# Patient Record
Sex: MALE | Race: WHITE | NOT HISPANIC OR LATINO | Employment: FULL TIME | URBAN - METROPOLITAN AREA
[De-identification: names, ages, dates, MRNs, and addresses within clinical notes are randomized per-mention and may not be internally consistent; named-entity substitution may affect disease eponyms.]

---

## 2017-01-04 ENCOUNTER — APPOINTMENT (OUTPATIENT)
Dept: CT IMAGING | Facility: CLINIC | Age: 42
End: 2017-01-04
Attending: EMERGENCY MEDICINE
Payer: COMMERCIAL

## 2017-01-04 ENCOUNTER — TELEPHONE (OUTPATIENT)
Dept: NURSING | Facility: CLINIC | Age: 42
End: 2017-01-04

## 2017-01-04 ENCOUNTER — HOSPITAL ENCOUNTER (EMERGENCY)
Facility: CLINIC | Age: 42
Discharge: HOME | End: 2017-01-04
Attending: EMERGENCY MEDICINE | Admitting: EMERGENCY MEDICINE
Payer: COMMERCIAL

## 2017-01-04 VITALS
HEART RATE: 103 BPM | DIASTOLIC BLOOD PRESSURE: 80 MMHG | SYSTOLIC BLOOD PRESSURE: 121 MMHG | TEMPERATURE: 97.4 F | HEIGHT: 74 IN | RESPIRATION RATE: 20 BRPM | OXYGEN SATURATION: 100 % | WEIGHT: 240 LBS | BODY MASS INDEX: 30.8 KG/M2

## 2017-01-04 DIAGNOSIS — K60.2 ANAL FISSURE: ICD-10-CM

## 2017-01-04 LAB
ALBUMIN SERPL-MCNC: 3.7 G/DL (ref 3.4–5)
ALBUMIN UR-MCNC: NEGATIVE MG/DL
ALP SERPL-CCNC: 65 U/L (ref 40–150)
ALT SERPL W P-5'-P-CCNC: 38 U/L (ref 0–70)
ANION GAP SERPL CALCULATED.3IONS-SCNC: 5 MMOL/L (ref 3–14)
APPEARANCE UR: CLEAR
AST SERPL W P-5'-P-CCNC: 19 U/L (ref 0–45)
BASOPHILS # BLD AUTO: 0 10E9/L (ref 0–0.2)
BASOPHILS NFR BLD AUTO: 0.5 %
BILIRUB SERPL-MCNC: 0.7 MG/DL (ref 0.2–1.3)
BILIRUB UR QL STRIP: NEGATIVE
BUN SERPL-MCNC: 13 MG/DL (ref 7–30)
CALCIUM SERPL-MCNC: 8.7 MG/DL (ref 8.5–10.1)
CHLORIDE SERPL-SCNC: 105 MMOL/L (ref 94–109)
CO2 SERPL-SCNC: 30 MMOL/L (ref 20–32)
COLOR UR AUTO: ABNORMAL
CREAT SERPL-MCNC: 1.1 MG/DL (ref 0.66–1.25)
DIFFERENTIAL METHOD BLD: NORMAL
EOSINOPHIL # BLD AUTO: 0.1 10E9/L (ref 0–0.7)
EOSINOPHIL NFR BLD AUTO: 1.5 %
ERYTHROCYTE [DISTWIDTH] IN BLOOD BY AUTOMATED COUNT: 12 % (ref 10–15)
GFR SERPL CREATININE-BSD FRML MDRD: 74 ML/MIN/1.7M2
GLUCOSE SERPL-MCNC: 104 MG/DL (ref 70–99)
GLUCOSE UR STRIP-MCNC: NEGATIVE MG/DL
HCT VFR BLD AUTO: 47.1 % (ref 40–53)
HGB BLD-MCNC: 16.5 G/DL (ref 13.3–17.7)
HGB UR QL STRIP: NEGATIVE
IMM GRANULOCYTES # BLD: 0 10E9/L (ref 0–0.4)
IMM GRANULOCYTES NFR BLD: 0.3 %
KETONES UR STRIP-MCNC: NEGATIVE MG/DL
LEUKOCYTE ESTERASE UR QL STRIP: NEGATIVE
LIPASE SERPL-CCNC: 105 U/L (ref 73–393)
LYMPHOCYTES # BLD AUTO: 1.8 10E9/L (ref 0.8–5.3)
LYMPHOCYTES NFR BLD AUTO: 30 %
MCH RBC QN AUTO: 30 PG (ref 26.5–33)
MCHC RBC AUTO-ENTMCNC: 35 G/DL (ref 31.5–36.5)
MCV RBC AUTO: 86 FL (ref 78–100)
MONOCYTES # BLD AUTO: 0.5 10E9/L (ref 0–1.3)
MONOCYTES NFR BLD AUTO: 7.7 %
MUCOUS THREADS #/AREA URNS LPF: PRESENT /LPF
NEUTROPHILS # BLD AUTO: 3.6 10E9/L (ref 1.6–8.3)
NEUTROPHILS NFR BLD AUTO: 60 %
NITRATE UR QL: NEGATIVE
NRBC # BLD AUTO: 0 10*3/UL
NRBC BLD AUTO-RTO: 0 /100
PH UR STRIP: 6 PH (ref 5–7)
PLATELET # BLD AUTO: 255 10E9/L (ref 150–450)
POTASSIUM SERPL-SCNC: 4.1 MMOL/L (ref 3.4–5.3)
PROT SERPL-MCNC: 7 G/DL (ref 6.8–8.8)
RBC # BLD AUTO: 5.5 10E12/L (ref 4.4–5.9)
RBC #/AREA URNS AUTO: <1 /HPF (ref 0–2)
SODIUM SERPL-SCNC: 140 MMOL/L (ref 133–144)
SP GR UR STRIP: 1.01 (ref 1–1.03)
URN SPEC COLLECT METH UR: ABNORMAL
UROBILINOGEN UR STRIP-MCNC: NORMAL MG/DL (ref 0–2)
WBC # BLD AUTO: 6 10E9/L (ref 4–11)
WBC #/AREA URNS AUTO: <1 /HPF (ref 0–2)

## 2017-01-04 PROCEDURE — 25000128 H RX IP 250 OP 636: Performed by: EMERGENCY MEDICINE

## 2017-01-04 PROCEDURE — 81001 URINALYSIS AUTO W/SCOPE: CPT | Performed by: EMERGENCY MEDICINE

## 2017-01-04 PROCEDURE — 85025 COMPLETE CBC W/AUTO DIFF WBC: CPT | Performed by: EMERGENCY MEDICINE

## 2017-01-04 PROCEDURE — 87086 URINE CULTURE/COLONY COUNT: CPT | Performed by: EMERGENCY MEDICINE

## 2017-01-04 PROCEDURE — 80053 COMPREHEN METABOLIC PANEL: CPT | Performed by: EMERGENCY MEDICINE

## 2017-01-04 PROCEDURE — 25000125 ZZHC RX 250: Performed by: EMERGENCY MEDICINE

## 2017-01-04 PROCEDURE — 74177 CT ABD & PELVIS W/CONTRAST: CPT

## 2017-01-04 PROCEDURE — 25000132 ZZH RX MED GY IP 250 OP 250 PS 637: Performed by: EMERGENCY MEDICINE

## 2017-01-04 PROCEDURE — 83690 ASSAY OF LIPASE: CPT | Performed by: EMERGENCY MEDICINE

## 2017-01-04 PROCEDURE — 96361 HYDRATE IV INFUSION ADD-ON: CPT

## 2017-01-04 PROCEDURE — 99285 EMERGENCY DEPT VISIT HI MDM: CPT | Mod: 25

## 2017-01-04 PROCEDURE — 25500064 ZZH RX 255 OP 636: Performed by: EMERGENCY MEDICINE

## 2017-01-04 PROCEDURE — 96374 THER/PROPH/DIAG INJ IV PUSH: CPT | Mod: 59

## 2017-01-04 RX ORDER — ONDANSETRON 4 MG/1
4 TABLET, ORALLY DISINTEGRATING ORAL EVERY 8 HOURS PRN
Qty: 10 TABLET | Refills: 0 | Status: SHIPPED | OUTPATIENT
Start: 2017-01-04 | End: 2017-01-07

## 2017-01-04 RX ORDER — ONDANSETRON 2 MG/ML
4 INJECTION INTRAMUSCULAR; INTRAVENOUS
Status: DISCONTINUED | OUTPATIENT
Start: 2017-01-04 | End: 2017-01-04 | Stop reason: HOSPADM

## 2017-01-04 RX ORDER — IOPAMIDOL 755 MG/ML
500 INJECTION, SOLUTION INTRAVASCULAR ONCE
Status: COMPLETED | OUTPATIENT
Start: 2017-01-04 | End: 2017-01-04

## 2017-01-04 RX ORDER — LIDOCAINE 50 MG/G
OINTMENT TOPICAL
Qty: 35.44 G | Refills: 3 | Status: SHIPPED | OUTPATIENT
Start: 2017-01-04 | End: 2017-11-13

## 2017-01-04 RX ORDER — OXYCODONE HYDROCHLORIDE 5 MG/1
5 TABLET ORAL ONCE
Status: COMPLETED | OUTPATIENT
Start: 2017-01-04 | End: 2017-01-04

## 2017-01-04 RX ORDER — ACETAMINOPHEN 500 MG
1000 TABLET ORAL ONCE
Status: COMPLETED | OUTPATIENT
Start: 2017-01-04 | End: 2017-01-04

## 2017-01-04 RX ORDER — OXYCODONE HYDROCHLORIDE 5 MG/1
5 TABLET ORAL EVERY 6 HOURS PRN
Qty: 20 TABLET | Refills: 0 | Status: SHIPPED | OUTPATIENT
Start: 2017-01-04 | End: 2017-11-13

## 2017-01-04 RX ADMIN — SODIUM CHLORIDE 65 ML: 9 INJECTION, SOLUTION INTRAVENOUS at 10:31

## 2017-01-04 RX ADMIN — SODIUM CHLORIDE 1000 ML: 9 INJECTION, SOLUTION INTRAVENOUS at 10:07

## 2017-01-04 RX ADMIN — IOPAMIDOL 100 ML: 755 INJECTION, SOLUTION INTRAVENOUS at 10:31

## 2017-01-04 RX ADMIN — OXYCODONE HYDROCHLORIDE 5 MG: 5 TABLET ORAL at 11:57

## 2017-01-04 RX ADMIN — ACETAMINOPHEN 1000 MG: 500 TABLET, FILM COATED ORAL at 09:51

## 2017-01-04 RX ADMIN — ONDANSETRON 4 MG: 2 INJECTION INTRAMUSCULAR; INTRAVENOUS at 10:07

## 2017-01-04 ASSESSMENT — ENCOUNTER SYMPTOMS
CONSTIPATION: 1
RECTAL PAIN: 1
NAUSEA: 0
BLOOD IN STOOL: 0
ANAL BLEEDING: 1
FEVER: 0
DIARRHEA: 1
ABDOMINAL PAIN: 0
VOMITING: 0

## 2017-01-04 NOTE — DISCHARGE INSTRUCTIONS
"Anal Fissure    Specific instructions from Colorectal Surgery:  - Discharge with topical diltiazem cream, apply pea-sized amount 2-3 daily for 6 weeks.    - Promote blood flow to the area with warm baths without salt for 10-15 minutes 2-3 times daily. Also can try sitting on a heating pad for discomfort. Also recommend warm bath before and after BM to relax pelvic floor  - Discharge with topical lidocaine, as needed for discomfort.  - Recommend discontinue medicated wipes and use gentle soaps or non-medicated wet wipes with gentle wiping following bowel movements  - Can follow-up with CRS as outpatient in 6 weeks, call clinic to schedule at [phone].        What is an anal fissure? -- An anal fissure is a tear in the lining of the anus, the opening where your bowel movements come out (figure 1). Anal fissures cause pain, especially during a bowel movement.  There is a muscle that wraps around the anus and holds it shut. It is called the \"anal sphincter.\" The sphincter gets tense when the anus is injured. In people with anal fissures, the sphincter goes into spasms, which can lead to further injury.  What causes anal fissures? -- An anal fissure is most often caused by having a hard, dry bowel movement.  What are the symptoms of an anal fissure? -- Most people who have an anal fissure feel a tearing, ripping, or burning pain when they have a bowel movement. This pain can last for hours. Some people also bleed slightly when they have a bowel movement. They might see bright red blood on the toilet paper or on the surface of the bowel movement. Some people with an anal fissure also have itching or irritation around the anus.  Should I see my doctor or nurse? -- Yes. See your doctor or nurse if you bleed when you have a bowel movement.  Are there tests I should have? -- Your doctor or nurse can check whether you have anal fissure by gently spreading your buttocks apart and looking at your anus.  If you have had " "bleeding, your doctor or nurse might send you for a test called a \"sigmoidoscopy\" or a similar test called a \"colonoscopy\" (figure 2). For these tests, the doctor puts a thin tube into your anus and advances it into your colon. The tube has a camera attached to it, so the doctor can look inside your colon and check for causes of bleeding.  What can I do on my own to help an anal fissure? -- You can:  ?Eat more fiber - Eating more fiber can help keep your bowel movements soft. Fiber is found in most fruits, vegetables, and whole grains (figure 3). Doctors suggest eating 20 to 35 grams of fiber a day. The nutrition label on packaged foods can show you much fiber you are getting in each serving (figure 4).  ?Take a stool softener - Stool softeners are medicines that help make your bowel movements easier to pass. You can buy them without a prescription. One stool softener that is often used is called docusate (sample brand name: Colace).  ?Soak your buttocks in a \"sitz bath\" - Soaking your buttocks in 2 or 3 inches of warm water is called taking a \"sitz bath.\" Do this 2 to 3 times a day for 10 to 15 minutes. Do not add soap, bubble bath, or anything else to the water. Sitz baths help relieve pain and relax the sphincter.  How are anal fissures treated? -- Treatment for anal fissures involves steps that are similar to the things you can do on your own.  For the first month of treatment, doctors recommend that people:  ?Take fiber supplements, such as:   Psyllium (sample brand name: Metamucil)   Methylcellulose (sample brand name: Citrucel)   Calcium polycarbophil (sample brand name: FiberCon)   Wheat dextran (sample brand name: Benefiber)  ?Take a stool softener, such as docusate (sample brand name: Colace).  ?Use nitroglycerin cream, a prescription cream that relieves pain and helps the anus heal.  ?Take sitz baths (see above).  If these steps to do not work, doctors can try other treatments, such as:  ?Botulinum toxin " "(\"BoTox\") - This is a shot that can help the anal sphincter muscle relax and heal. It can help, but it can also cause short-term problems with leaking of gas or bowel movements.  ?Surgery - During surgery, the doctor makes a small cut in the sphincter to help it relax. This surgery works in most patients, but doctors offer it only to people who do not get better with other treatments. Surgery can cause lasting problems with leaking of gas or bowel movements.    "

## 2017-01-04 NOTE — TELEPHONE ENCOUNTER
"Call Type: Triage Call    Presenting Problem: \"I have extreme discomfort in my anus.\" Patient  reporting symptoms starting 3 weeks ago with rectal pain that his  increased in past few days. Reporting leaking of \"white fluid.\"  Patient agrees to go into [facility] ED.  Triage Note:  Guideline Title: Rectal Symptoms  Recommended Disposition: See ED Immediately  Original Inclination: Did not know what to do  Override Disposition:  Intended Action: Follow advice given  Physician Contacted: No  Constant pain in rectum or rectal area AND swelling, boil or abscess around  rectum, with or without foul-smelling, pus-like drainage ?  YES  Passing red, black or tarry material from rectum AND onset of new signs and  symptoms of hypovolemia ? NO  Primary concern is constipation ? NO  Reports physical or sexual abuse AND feels they are in immediate danger ? NO  Severe, unrelenting rectal pain ? NO  Associated with abdominal pain or discomfort ? NO  Excessive or unusually odorous flatulence ? NO  Rectal bleeding (blood in or on the stool, more than scant; black tarry stools) ?  NO  Trauma within last 6 hours AND severe pain OR bright red rectal bleeding ? NO  Physician Instructions:  Care Advice: Another adult should drive.  Do not give the patient anything to eat or drink.  Write down provider's name. List or place the following in a bag for  transport with the patient: current prescription and/or nonprescription  medications  alternative treatments, therapies and medications  and street drugs.  "

## 2017-01-04 NOTE — ED AVS SNAPSHOT
[facility] Emergency Department    [address]    [facility] [address]    Phone:  [phone]    Fax:  [phone]                                       Paul Mckeon   MRN: [id number]    Department:  [facility] Emergency Department   Date of Visit:  1/4/2017           After Visit Summary Signature Page     I have received my discharge instructions, and my questions have been answered. I have discussed any challenges I see with this plan with the nurse or doctor.    ..........................................................................................................................................  Patient/Patient Representative Signature      ..........................................................................................................................................  Patient Representative Print Name and Relationship to Patient    ..................................................               ................................................  Date                                            Time    ..........................................................................................................................................  Reviewed by Signature/Title    ...................................................              ..............................................  Date                                                            Time

## 2017-01-04 NOTE — CONSULTS
[facility]  Colon and Rectal Surgery Consult Note  Name: Paul Mckeon    MRN: [id number]  YOB: 1975    Age: 41 year old  Date of admission: 1/4/2017  Primary care provider: Shiraz Gil     Requesting Physician:  ED physician  Reason for consult:  Rectal pain            History of Present Illness:   Paul Mckeon is a 41 year old male who presents with rectal pain worsening for the past 2-3 weeks. He describes pain as ripping or tearing with bowel movements with residual pain and intermittent sharp stabbing pain. He has had bleeding with wiping and in his stool that seems to be worsening. He had some leaking of clear fluid over the past few days that seems worst last night. He has been applying Tux and goldbond with lidocaine with minimal relief of pain. He feels he noticed a visible external hemorrhoid a few days ago that has since resolved. Pt has BM daily that are initially harder and more difficult to pass followed by soft stools. Pt denies fever or chills.   He has no history of rectal surgery.     In ED, pt has remained afebrile, VSS. WBC within normal limits. Abd/pelvis CT without abnormalities, no signs of abscess or infection.               Past Medical History:   History reviewed. No pertinent past medical history.          Past Surgical History:     Past Surgical History   Procedure Laterality Date     Arthroscopic reconstruction anterior and posterior cruciate ligament, combined       Tonsillectomy       Hand surgery                 Social History:     Social History   Substance Use Topics     Smoking status: Former Smoker     Types: Cigarettes     Smokeless tobacco: Current User     Types: Chew      Comment: quit smoking in early 2000's and quit chew in 2013     Alcohol Use: 8.4 oz/week     14 Standard drinks or equivalent per week      Comment: 2-3 days per week, 2-6 drinks per occasion             Family History:     Family History   Problem Relation Age of Onset      "Thyroid Disease Mother      Hypertension Mother      Obesity Mother      HEART DISEASE Father      Arrythmia     Thyroid Disease Maternal Grandmother      Respiratory Father      EB     Respiratory Mother      EB             Allergies:     Allergies   Allergen Reactions     Seasonal Allergies              Medications:              Review of Systems:   A comprehensive greater than 10 system review of systems was carried out.  Pertinent positives and negatives are noted above.  Otherwise negative for contributory info.            Physical Exam:   Blood pressure 130/74, pulse 103, temperature 97.4  F (36.3  C), temperature source Temporal, resp. rate 18, height 1.88 m (6' 2\"), weight 108.863 kg (240 lb), SpO2 96 %.  No intake or output data in the 24 hours ending 01/04/17 1230  Exam:  GENERAL: Awake alert and oriented  LUNGS: Non labored breathing  EXTREMITIES: warm without edema  RECTAL: Tenderness on exam with superficial posterior fissure visualized on exam.          Data:     Results for orders placed or performed during the hospital encounter of 01/04/17   CT Abdomen Pelvis w Contrast    Narrative    CT ABDOMEN/PELVIS WITH CONTRAST January 4, 2017 10:40 AM     HISTORY: Perirectal pain and drainage.    COMPARISON: None.    TECHNIQUE: CT of the abdomen and pelvis was performed following the  administration of 100 mL Isovue-370 intravenous contrast. Radiation  dose for this scan was reduced using automated exposure control,  adjustment of the mA and/or kV according to patient size, or iterative  reconstruction technique.    FINDINGS: There is a 3 mm hypodensity in the right lobe of the liver.  This is too small to characterize but most likely represents a tiny  cyst. The remaining solid organs in the abdomen appear unremarkable.    There are a few scattered diverticuli but no evidence for acute  diverticulitis. The bowel otherwise appears grossly unremarkable. No  definite abscess in the perirectal region on CT.   "    Impression    IMPRESSION: No definite acute abnormality.         Recent Labs  Lab 01/04/17  0956   WBC 6.0   HGB 16.5   HCT 47.1   MCV 86          Recent Labs  Lab 01/04/17  0956      POTASSIUM 4.1   CHLORIDE 105   CO2 30   ANIONGAP 5   *   BUN 13   CR 1.10   GFRESTIMATED 74   GFRESTBLACK 90   ANDREW 8.7   PROTTOTAL 7.0   ALBUMIN 3.7   BILITOTAL 0.7   ALKPHOS 65   AST 19   ALT 38         Assessment and Plan:   Paul Mckeon is a 40 yo man who presents with 2-3 weeks of worsening rectal pain.   1. No emergent surgical intervention indicated. Agree with non-operative management.   2. Discharge with topical diltiazem cream, apply pea-sized amount 2-3 daily for 6 weeks.   3. Promote blood flow to the area with warm baths without salt for 10-15 minutes 2-3 times daily. Also can try sitting on a heating pad for discomfort. Also recommend warm bath before and after BM to relax pelvic floor  4. Discharge with topical lidocaine, as needed for discomfort.  5. Recommend pt discontinue medicated wipes and use gentle soaps or non-medicated wet wipes with gentle wiping following bowel movements  6. Can follow-up with CRS as outpatient in 6 weeks, call clinic to schedule at [phone].         Lesa Morocho PA-C  [facility]  [phone]

## 2017-01-04 NOTE — ED PROVIDER NOTES
"  History     Chief Complaint:  Other    HPI   Paul Mckeon is a 41 year old male with a history of hemorrhoids who presents with rectal pain. He states that he has had a burning pain originating from the rectum for the past two to three weeks, which started as a burning pain. The pain has gotten progressively worse over this time, and he has noted a white discharge from the rectum. He endorses diarrhea along with constipation, though denies abdominal pain. He notes bright red blood per rectum. States he has had stools that are grey in appearance. Denies fever, numbness, nausea, vomiting.    Allergies:  Seasonal allergies      Medications:    The patient is currently on no regular medications.    Past Medical History:    The patient denies any relevant past medical history.      Past Surgical History:    Arthroscopic ACL repair  Tonsillectomy  Hand surgery     Family History:    Thyroid disease  HTN  Obesity  Arrhythmia  EB  Respiratory     Social History:  The patient was accompanied to the ED by wife.  Smoking Status: Former smoker  Smokeless Tobacco: Current user  Alcohol Use: Yes   Marital Status:   [2]     Review of Systems   Constitutional: Negative for fever.   Gastrointestinal: Positive for diarrhea, constipation, anal bleeding and rectal pain. Negative for nausea, vomiting, abdominal pain and blood in stool.   All other systems reviewed and are negative.    Physical Exam   Vitals:  Patient Vitals for the past 24 hrs:   BP Temp Temp src Pulse Resp SpO2 Height Weight   01/04/17 1336 121/80 mmHg - - - 20 100 % - -   01/04/17 1251 - - - - - 96 % - -   01/04/17 1215 - - - - - 96 % - -   01/04/17 1158 130/74 mmHg - - - - 96 % - -   01/04/17 1015 - - - - - 95 % - -   01/04/17 1013 - - - - - 95 % - -   01/04/17 1012 135/84 mmHg - - - - 93 % - -   01/04/17 0843 (!) 152/111 mmHg 97.4  F (36.3  C) Temporal 103 18 99 % 1.88 m (6' 2\") 108.863 kg (240 lb)      Physical Exam  Constitutional: Alert, " attentive  HENT:    Nose: Nose normal.    Mouth/Throat: Oropharynx is clear, mucous membranes are moist  Eyes:  EOM are normal. Pupils are equal, round, and reactive to light.   CV:  regular rate and rhythm; no murmurs, rubs or gallups  Chest: Effort normal and breath sounds normal.   GI:   Epigastrium - no tenderness, no guarding   RUQ - no tenderness or Ruiz's sign   RLQ - No tenderness, no guarding, no Rovsing's sign   Suprapubic area - no tenderness, no guarding    LLQ - no tenderness, no guarding   LUQ - no tenderness, no guarding   No distension. Normal bowel sounds   Edematous mucosa to the perirectal area, no palpable fluctuance or drainage; no obvious external hemorrhoid; MIGUEL with pain externally, no obvious bogginess or tenderness to the prostate  MSK: Normal range of motion.   Neurological: Alert, attentive  Skin: Skin is warm and dry.    Emergency Department Course     Imaging:  Radiology findings were communicated with the patient who voiced understanding of the findings.  CT Abdomen and pelvis with contrast:  No definite acute abnormality.  Reading per radiology.     Laboratory:  Laboratory findings were communicated with the patient who voiced understanding of the findings.  CBC: AWNL. (WBC 6.0, HGB 16.5, )   CMP: Glucose: 104 (H) o/w WNL (Creatinine 1.10)   Lipase: 105  UA: Mucous: Present (A) o/w normal     Interventions:  0951 Tylenol 1000 mg PO   1007 Normal Saline 1000 mL IV  1007 Zofran 4 mg IV  1157 Roxicodone 5 mg PO     Emergency Department Course:  Nursing notes and vitals reviewed.  I performed an exam of the patient as documented above.   IV was inserted and blood was drawn for laboratory testing, results above.   The patient was sent for a abdomen and pelvis CT with contrast while in the emergency department, results above.    The patient provided a urine sample here in the emergency department. This was sent for laboratory testing, findings above.   At 1107 the patient was  rechecked and was updated on the results of his tests.   1128 AM: I spoke with Dr. Dowell of the radiology service regarding patient's CT results.  1200 AM: I spoke with Lesa Morocho PA-C, of the colo-rectal service regarding patient's presentation, findings, and plan of care. They will evaluate the patient in the ED.  1300 The patient was rechecked and is feeling much better after interventions here.     I discussed the treatment plan with the patient. They expressed understanding of this plan and consented to discharge. They will be discharged home with instructions for care and follow up. In addition, the patient will return to the emergency department if their symptoms persist, worsen, if new symptoms arise or if there is any concern.  All questions were answered.    I personally reviewed the laboratory results with the Patient and answered all related questions prior to discharge.    Impression & Plan      Medical Decision Making:  Paul Mckeon is a 41 year old male who presents to the emergency department today with rectal pain. Initial exam does not reveal a thrombosed hemorrhoid or other acute process. CT shows does not show infections such as perirectal abscess. [place]-rect[facility] surgery saw the patient and note a posterior anal fissure. We discussed supportive cares and he felt better on recheck. I believe he is safe for discharge at this time with diltiazem cram and lidocaine topically, sitz baths and colo-rectal follow up in 2-3 weeks. Return with worse pain, fever, vomiting or other concerns.     Diagnosis:    ICD-10-CM    1. Anal fissure K60.2 Urine Culture Aerobic Bacterial      Disposition:   Discharge to home    Discharge Medications:  Discharge Medication List as of 1/4/2017  1:26 PM      START taking these medications    Details   diltiazem 2% in PLO cream, FV COMPOUNDED, 2% GEL Apply pea sized amount 2-3 times daily for 6 weeks, Disp-30 g, R-3, Local Print      lidocaine (XYLOCAINE) 5 % ointment Apply  2-3 times daily for painDisp-35.44 g, R-3Local Print      oxyCODONE (ROXICODONE) 5 MG IR tablet Take 1 tablet (5 mg) by mouth every 6 hours as needed for moderate to severe pain, Disp-20 tablet, R-0, Local Print      ondansetron (ZOFRAN ODT) 4 MG ODT tab Take 1 tablet (4 mg) by mouth every 8 hours as needed for nausea, Disp-10 tablet, R-0, Local Print           Scribe Disclosure:  I, Paul Escalera, am serving as a scribe at 9:36 AM on 1/4/2017 to document services personally performed by Enrico Durham MD, based on my observations and the provider's statements to me.   1/4/2017   [facility] EMERGENCY DEPARTMENT        Enrico Durham MD  01/04/17 0895

## 2017-01-04 NOTE — ED AVS SNAPSHOT
" [facility]    [address] [place] [address]    Phone:  [phone]    Fax:  [phone]                                       Paul Mckeon   MRN: [id number]    Department:  [facility]   Date of Visit:  1/4/2017           Patient Information     Date Of Birth          1975        Your diagnoses for this visit were:     Anal fissure        You were seen by Enrico Durham MD.      Follow-up Information     Follow up with Chandler Akbar MD In 2 weeks.    Specialty:  Colon and Rectal Surgery    Contact information:    [facility]  [address] [place] [address]  [phone]          Discharge Instructions       Anal Fissure    Specific instructions from Colorectal Surgery:  - Discharge with topical diltiazem cream, apply pea-sized amount 2-3 daily for 6 weeks.    - Promote blood flow to the area with warm baths without salt for 10-15 minutes 2-3 times daily. Also can try sitting on a heating pad for discomfort. Also recommend warm bath before and after BM to relax pelvic floor  - Discharge with topical lidocaine, as needed for discomfort.  - Recommend discontinue medicated wipes and use gentle soaps or non-medicated wet wipes with gentle wiping following bowel movements  - Can follow-up with [facility] as outpatient in 6 weeks, call clinic to schedule at [phone].        What is an anal fissure? -- An anal fissure is a tear in the lining of the anus, the opening where your bowel movements come out (figure 1). Anal fissures cause pain, especially during a bowel movement.  There is a muscle that wraps around the anus and holds it shut. It is called the \"anal sphincter.\" The sphincter gets tense when the anus is injured. In people with anal fissures, the sphincter goes into spasms, which can lead to further injury.  What causes anal fissures? -- An anal fissure is most often caused by having a hard, " "dry bowel movement.  What are the symptoms of an anal fissure? -- Most people who have an anal fissure feel a tearing, ripping, or burning pain when they have a bowel movement. This pain can last for hours. Some people also bleed slightly when they have a bowel movement. They might see bright red blood on the toilet paper or on the surface of the bowel movement. Some people with an anal fissure also have itching or irritation around the anus.  Should I see my doctor or nurse? -- Yes. See your doctor or nurse if you bleed when you have a bowel movement.  Are there tests I should have? -- Your doctor or nurse can check whether you have anal fissure by gently spreading your buttocks apart and looking at your anus.  If you have had bleeding, your doctor or nurse might send you for a test called a \"sigmoidoscopy\" or a similar test called a \"colonoscopy\" (figure 2). For these tests, the doctor puts a thin tube into your anus and advances it into your colon. The tube has a camera attached to it, so the doctor can look inside your colon and check for causes of bleeding.  What can I do on my own to help an anal fissure? -- You can:  ?Eat more fiber - Eating more fiber can help keep your bowel movements soft. Fiber is found in most fruits, vegetables, and whole grains (figure 3). Doctors suggest eating 20 to 35 grams of fiber a day. The nutrition label on packaged foods can show you much fiber you are getting in each serving (figure 4).  ?Take a stool softener - Stool softeners are medicines that help make your bowel movements easier to pass. You can buy them without a prescription. One stool softener that is often used is called docusate (sample brand name: Colace).  ?Soak your buttocks in a \"sitz bath\" - Soaking your buttocks in 2 or 3 inches of warm water is called taking a \"sitz bath.\" Do this 2 to 3 times a day for 10 to 15 minutes. Do not add soap, bubble bath, or anything else to the water. Sitz baths help relieve pain " "and relax the sphincter.  How are anal fissures treated? -- Treatment for anal fissures involves steps that are similar to the things you can do on your own.  For the first month of treatment, doctors recommend that people:  ?Take fiber supplements, such as:   Psyllium (sample brand name: Metamucil)   Methylcellulose (sample brand name: Citrucel)   Calcium polycarbophil (sample brand name: FiberCon)   Wheat dextran (sample brand name: Benefiber)  ?Take a stool softener, such as docusate (sample brand name: Colace).  ?Use nitroglycerin cream, a prescription cream that relieves pain and helps the anus heal.  ?Take sitz baths (see above).  If these steps to do not work, doctors can try other treatments, such as:  ?Botulinum toxin (\"BoTox\") - This is a shot that can help the anal sphincter muscle relax and heal. It can help, but it can also cause short-term problems with leaking of gas or bowel movements.  ?Surgery - During surgery, the doctor makes a small cut in the sphincter to help it relax. This surgery works in most patients, but doctors offer it only to people who do not get better with other treatments. Surgery can cause lasting problems with leaking of gas or bowel movements.      24 Hour Appointment Hotline       To make an appointment at any [facility] clinic, call [phone] ([phone]). If you don't have a family doctor or clinic, we will help you find one. [facility] clinics are conveniently located to serve the needs of you and your family.             Review of your medicines      START taking        Dose / Directions Last dose taken    diltiazem 2% in PLO cream (FV COMPOUNDED) 2% Gel   Quantity:  30 g        Apply pea sized amount 2-3 times daily for 6 weeks   Refills:  3        lidocaine 5 % ointment   Commonly known as:  XYLOCAINE   Quantity:  35.44 g        Apply 2-3 times daily for pain   Refills:  3        ondansetron 4 MG ODT tab   Commonly known as:  ZOFRAN ODT   Dose:  4 mg   Quantity:  10 " tablet        Take 1 tablet (4 mg) by mouth every 8 hours as needed for nausea   Refills:  0        oxyCODONE 5 MG IR tablet   Commonly known as:  ROXICODONE   Dose:  5 mg   Quantity:  20 tablet        Take 1 tablet (5 mg) by mouth every 6 hours as needed for moderate to severe pain   Refills:  0          Our records show that you are taking the medicines listed below. If these are incorrect, please call your family doctor or clinic.        Dose / Directions Last dose taken    * ibuprofen 800 MG tablet   Commonly known as:  ADVIL/MOTRIN   Dose:  800 mg   Quantity:  90 tablet        Take 1 tablet (800 mg) by mouth every 8 hours as needed for moderate pain   Refills:  0        * ibuprofen 800 MG tablet   Commonly known as:  ADVIL/MOTRIN   Dose:  800 mg   Quantity:  30 tablet        Take 1 tablet (800 mg) by mouth every 8 hours as needed for moderate pain   Refills:  1        * Notice:  This list has 2 medication(s) that are the same as other medications prescribed for you. Read the directions carefully, and ask your doctor or other care provider to review them with you.            Prescriptions were sent or printed at these locations (4 Prescriptions)                   Other Prescriptions                Printed at Department/Unit printer (4 of 4)         diltiazem 2% in PLO cream, FV COMPOUNDED, 2% GEL               lidocaine (XYLOCAINE) 5 % ointment               oxyCODONE (ROXICODONE) 5 MG IR tablet               ondansetron (ZOFRAN ODT) 4 MG ODT tab                Procedures and tests performed during your visit     CBC + differential    CT Abdomen Pelvis w Contrast    Comprehensive metabolic panel    Lipase    UA with Microscopic    Urine Culture Aerobic Bacterial      Orders Needing Specimen Collection     None      Pending Results     Date and Time Order Name Status Description    1/4/2017 0945 Urine Culture Aerobic Bacterial In process     1/4/2017 0939 CT Abdomen Pelvis w Contrast Preliminary              Pending Culture Results     Date and Time Order Name Status Description    1/4/2017 0945 Urine Culture Aerobic Bacterial In process        Test Results from your hospital stay           1/4/2017 10:47 AM - Interface, Radiant Ib      Narrative     CT ABDOMEN/PELVIS WITH CONTRAST January 4, 2017 10:40 AM     HISTORY: Perirectal pain and drainage.    COMPARISON: None.    TECHNIQUE: CT of the abdomen and pelvis was performed following the  administration of 100 mL Isovue-370 intravenous contrast. Radiation  dose for this scan was reduced using automated exposure control,  adjustment of the mA and/or kV according to patient size, or iterative  reconstruction technique.    FINDINGS: There is a 3 mm hypodensity in the right lobe of the liver.  This is too small to characterize but most likely represents a tiny  cyst. The remaining solid organs in the abdomen appear unremarkable.    There are a few scattered diverticuli but no evidence for acute  diverticulitis. The bowel otherwise appears grossly unremarkable. No  definite abscess in the perirectal region on CT.        Impression     IMPRESSION: No definite acute abnormality.         1/4/2017 10:10 AM - [facility]ilab Results      Component Results     Component Value Ref Range & Units Status    WBC 6.0 4.0 - 11.0 10e9/L Final    RBC Count 5.50 4.4 - 5.9 10e12/L Final    Hemoglobin 16.5 13.3 - 17.7 g/dL Final    Hematocrit 47.1 40.0 - 53.0 % Final    MCV 86 78 - 100 fl Final    MCH 30.0 26.5 - 33.0 pg Final    MCHC 35.0 31.5 - 36.5 g/dL Final    RDW 12.0 10.0 - 15.0 % Final    Platelet Count 255 150 - 450 10e9/L Final    Diff Method Automated Method  Final    % Neutrophils 60.0 % Final    % Lymphocytes 30.0 % Final    % Monocytes 7.7 % Final    % Eosinophils 1.5 % Final    % Basophils 0.5 % Final    % Immature Granulocytes 0.3 % Final    Nucleated RBCs 0 0 /100 Final    Absolute Neutrophil 3.6 1.6 - 8.3 10e9/L Final    Absolute Lymphocytes 1.8 0.8 - 5.3 10e9/L Final     Absolute Monocytes 0.5 0.0 - 1.3 10e9/L Final    Absolute Eosinophils 0.1 0.0 - 0.7 10e9/L Final    Absolute Basophils 0.0 0.0 - 0.2 10e9/L Final    Abs Immature Granulocytes 0.0 0 - 0.4 10e9/L Final    Absolute Nucleated RBC 0.0  Final         1/4/2017 10:28 AM - Interface, Flexilab Results      Component Results     Component Value Ref Range & Units Status    Sodium 140 133 - 144 mmol/L Final    Potassium 4.1 3.4 - 5.3 mmol/L Final    Chloride 105 94 - 109 mmol/L Final    Carbon Dioxide 30 20 - 32 mmol/L Final    Anion Gap 5 3 - 14 mmol/L Final    Glucose 104 (H) 70 - 99 mg/dL Final    Urea Nitrogen 13 7 - 30 mg/dL Final    Creatinine 1.10 0.66 - 1.25 mg/dL Final    GFR Estimate 74 >60 mL/min/1.7m2 Final    Non  GFR Calc    GFR Estimate If Black 90 >60 mL/min/1.7m2 Final    African American GFR Calc    Calcium 8.7 8.5 - 10.1 mg/dL Final    Bilirubin Total 0.7 0.2 - 1.3 mg/dL Final    Albumin 3.7 3.4 - 5.0 g/dL Final    Protein Total 7.0 6.8 - 8.8 g/dL Final    Alkaline Phosphatase 65 40 - 150 U/L Final    ALT 38 0 - 70 U/L Final    AST 19 0 - 45 U/L Final         1/4/2017 10:28 AM - Interface, Flexilab Results      Component Results     Component Value Ref Range & Units Status    Lipase 105 73 - 393 U/L Final         1/4/2017 10:24 AM - Interface, Flexilab Results      Component Results     Component Value Ref Range & Units Status    Color Urine Light Yellow  Final    Appearance Urine Clear  Final    Glucose Urine Negative NEG mg/dL Final    Bilirubin Urine Negative NEG Final    Ketones Urine Negative NEG mg/dL Final    Specific Gravity Urine 1.006 1.003 - 1.035 Final    Blood Urine Negative NEG Final    pH Urine 6.0 5.0 - 7.0 pH Final    Protein Albumin Urine Negative NEG mg/dL Final    Urobilinogen mg/dL Normal 0.0 - 2.0 mg/dL Final    Nitrite Urine Negative NEG Final    Leukocyte Esterase Urine Negative NEG Final    Source Midstream Urine  Final    WBC Urine <1 0 - 2 /HPF Final    RBC Urine  <1 0 - 2 /HPF Final    Mucous Urine Present (A) NEG /LPF Final         1/4/2017 11:44 AM - Interface, Flexilab Results                Clinical Quality Measure: Blood Pressure Screening     Your blood pressure was checked while you were in the emergency department today. The last reading we obtained was  BP: 130/74 mmHg . Please read the guidelines below about what these numbers mean and what you should do about them.  If your systolic blood pressure (the top number) is less than 120 and your diastolic blood pressure (the bottom number) is less than 80, then your blood pressure is normal. There is nothing more that you need to do about it.  If your systolic blood pressure (the top number) is 120-139 or your diastolic blood pressure (the bottom number) is 80-89, your blood pressure may be higher than it should be. You should have your blood pressure rechecked within a year by a primary care provider.  If your systolic blood pressure (the top number) is 140 or greater or your diastolic blood pressure (the bottom number) is 90 or greater, you may have high blood pressure. High blood pressure is treatable, but if left untreated over time it can put you at risk for heart attack, stroke, or kidney failure. You should have your blood pressure rechecked by a primary care provider within the next 4 weeks.  If your provider in the emergency department today gave you specific instructions to follow-up with your doctor or provider even sooner than that, you should follow that instruction and not wait for up to 4 weeks for your follow-up visit.        Thank you for choosing [facility]       Thank you for choosing [facility] for your care. Our goal is always to provide you with excellent care. Hearing back from our patients is one way we can continue to improve our services. Please take a few minutes to complete the written survey that you may receive in the mail after you visit with us. Thank you!        MyChart Information      [facility] gives you secure access to your electronic health record. If you see a primary care provider, you can also send messages to your care team and make appointments. If you have questions, please call your primary care clinic.  If you do not have a primary care provider, please call [phone] and they will assist you.        Care EveryWhere ID     This is your Care EveryWhere ID. This could be used by other organizations to access your [facility] medical records  [id number]        After Visit Summary       This is your record. Keep this with you and show to your community pharmacist(s) and doctor(s) at your next visit.

## 2017-01-05 LAB
BACTERIA SPEC CULT: NO GROWTH
Lab: NORMAL
MICRO REPORT STATUS: NORMAL
SPECIMEN SOURCE: NORMAL

## 2017-03-14 ENCOUNTER — TELEPHONE (OUTPATIENT)
Dept: FAMILY MEDICINE | Facility: CLINIC | Age: 42
End: 2017-03-14

## 2017-03-14 DIAGNOSIS — F17.200 TOBACCO USE DISORDER: Primary | ICD-10-CM

## 2017-03-14 RX ORDER — BUPROPION HYDROCHLORIDE 150 MG/1
TABLET, EXTENDED RELEASE ORAL
Qty: 60 TABLET | Refills: 2 | Status: SHIPPED | OUTPATIENT
Start: 2017-03-14 | End: 2017-07-04

## 2017-03-14 NOTE — TELEPHONE ENCOUNTER
Pt calling stating he is ready to quit smoking and has used bupropion in the past.  He is wondering if PCP will prescribe this for him again.    Please advise.  Pt last seen for px on 12/12/16    Michael Rios RN, BSN

## 2017-05-08 ENCOUNTER — OFFICE VISIT (OUTPATIENT)
Dept: FAMILY MEDICINE | Facility: CLINIC | Age: 42
End: 2017-05-08
Payer: COMMERCIAL

## 2017-05-08 VITALS
OXYGEN SATURATION: 95 % | TEMPERATURE: 97.4 F | SYSTOLIC BLOOD PRESSURE: 128 MMHG | BODY MASS INDEX: 31.18 KG/M2 | DIASTOLIC BLOOD PRESSURE: 80 MMHG | HEART RATE: 81 BPM | HEIGHT: 74 IN | WEIGHT: 243 LBS

## 2017-05-08 DIAGNOSIS — J30.2 SEASONAL ALLERGIC RHINITIS, UNSPECIFIED ALLERGIC RHINITIS TRIGGER: ICD-10-CM

## 2017-05-08 DIAGNOSIS — B00.1 RECURRENT COLD SORES: ICD-10-CM

## 2017-05-08 DIAGNOSIS — J20.9 ACUTE BRONCHITIS WITH COEXISTING CONDITION REQUIRING PROPHYLACTIC TREATMENT: Primary | ICD-10-CM

## 2017-05-08 PROCEDURE — 99213 OFFICE O/P EST LOW 20 MIN: CPT

## 2017-05-08 RX ORDER — ACYCLOVIR 400 MG/1
400 TABLET ORAL 2 TIMES DAILY
Qty: 90 TABLET | Refills: 0 | Status: SHIPPED | OUTPATIENT
Start: 2017-05-08 | End: 2017-07-06

## 2017-05-08 RX ORDER — FLUTICASONE PROPIONATE 50 MCG
1-2 SPRAY, SUSPENSION (ML) NASAL DAILY
Qty: 16 G | Refills: 0 | Status: SHIPPED | OUTPATIENT
Start: 2017-05-08 | End: 2017-11-13

## 2017-05-08 RX ORDER — DOXYCYCLINE 100 MG/1
100 CAPSULE ORAL 2 TIMES DAILY
Qty: 20 CAPSULE | Refills: 0 | Status: SHIPPED | OUTPATIENT
Start: 2017-05-08 | End: 2017-05-18

## 2017-05-08 NOTE — PATIENT INSTRUCTIONS
What Is Acute Bronchitis?  Acute or short-term bronchitis last for days or weeks. It occurs when the bronchial tubes (airways in the lungs) are irritated by a virus, bacteria, or allergen. This causes a cough that produces yellow or greenish mucus.  Inside healthy lungs    Air travels in and out of the lungs through the airways. The linings of these airways produce sticky mucus. This mucus traps particles that enter the lungs. Tiny structures called cilia then sweep the particles out of the airways.     Healthy airway: Airways are normally open. Air moves in and out easily.      Healthy cilia: Tiny, hairlike cilia sweep mucus and particles up and out of the airways.   Lungs with bronchitis  Bronchitis often occurs with a cold or the flu virus. The airways become inflamed (red and swollen). There is a deep  hacking  cough from the extra mucus. Other symptoms may include:    Wheezing    Chest discomfort    Shortness of breath    Mild fever  A second infection, this time due to bacteria, may then occur. And airways irritated by allergens or smoke are more likely to get infected.        Inflamed airway: Inflammation and extra mucus narrow the airway, causing shortness of breath.      Impaired cilia: Extra mucus impairs cilia, causing congestion and wheezing. Smoking makes the problem worse.   Making a diagnosis  A physical exam, health history, and certain tests help your healthcare provider make the diagnosis.  Health history  Your healthcare provider will ask you about your symptoms.  The exam  Your provider listens to your chest for signs of congestion. He or she may also check your ears, nose, and throat.  Possible tests    A sputum test for bacteria. This requires a sample of mucus from the lungs.    A nasal or throat swab for bacterial infection.    A chest X-ray if your healthcare provider thinks you have pneumonia.    Tests to check for an underlying condition, such as allergies, asthma, or COPD. You may need  to see a specialist for more lung function testing.  Treating a cough  The main treatment for bronchitis is easing symptoms. Avoiding smoke, allergens, and other things that trigger coughing can often help. If the infection is bacterial, you may be given antibiotics. During the illness, it's important to get plenty of sleep. To ease symptoms:    Don t smoke, and avoid secondhand smoke.    Use a humidifier, or breathe in steam from a hot shower. This may help loosen mucus.    Drink a lot of water and juice. They can soothe the throat and may help thin mucus.    Sit up or use extra pillows when in bed to help lessen coughing and congestion.    Ask your provider about using cough medicine, pain and fever medicine, or a decongestant.  Antibiotics  Most cases of bronchitis are caused by cold or flu viruses. Antibiotics don t treat viral illness. Taking antibiotics when they are not needed increases your risk of getting an infection later that is antibiotic-resistant. Your provider will prescribe antibiotics if the infection is caused by bacteria. If they are prescribed:    Take the medicine until it is used up, even if symptoms have improved. If you don t, the bronchitis may come back.    Take them as directed. For instance, some medicines should be taken with food.    Ask your provider or pharmacist what side effects are common, and what to do about them.  Follow-up care  You should see your provider again in 2 to 3 weeks. By this time, symptoms should have improved. An infection that lasts longer may mean you have a more serious problem.  Prevention    Avoid tobacco smoke. If you smoke, quit. Stay away from smoky places. Ask friends and family not to smoke around you, or in your home or car.    Get checked for allergies.    Ask your provider about getting a yearly flu shot, and pneumococcal or pneumonia shots.    Wash your hands often. This helps reduce the chance of picking up viruses that cause colds and flu.  Call  your healthcare provider if:    Symptoms worsen, or new symptoms develop.    Breathing problems worsen or  become severe.    Symptoms don t get better within a week, or within 3 days of taking antibiotics.     1424-4503 [employer]. [address] [place] [address], [place] [address]. All rights reserved. This information is not intended as a substitute for professional medical care. Always follow your healthcare professional's instructions.        Understanding Nasal Allergies  Nasal allergies (also called allergic rhinitis) are a common health problem. They may be seasonal.This means they cause symptoms only at certain times of the year. Or they may be perennial.This means they cause symptoms all year long. Other health problems, such as asthma, often occur along with allergies as well.    What Is an allergic reaction?  An allergy is a reaction to a substance called an allergen. Common allergens include:    Wind-borne pollen    Mold    Dust mites    Furry and feathered animals    Cockroaches  Normally, allergens are harmless. But when a person has allergies, their body thinks they are harmful. Their body then attacks allergens with antibodies. Antibodies are attached to special cells called mast cells. Allergens stick to the antibodies. This makes the mast cells release histamine and other chemicals. This is an allergic reaction. The chemicals irritate nearby nasal tissue. This causes nasal allergy symptoms.  Common nasal allergy symptoms  Allergies can cause nasal tissue to swell. This makes the air passages smaller. The nose may feel stuffed up. The nose may also make extra mucus, which can plug the nasal passages or drip out of the nose. Mucus can drip down the back of the throat (postnasal drip) as well. Sinus tissue can swell. This may cause pain and headache. Common allergy symptoms include:    Runny nose with clear, watery discharge    Stuffy nose (nasal congestion)    Drainage down your throat  (postnasal drip)    Sneezing    Red, watery eyes    Itchy nose, eyes, ears, and throat    Plugged-up ears (ear congestion)    Sore throat    Coughing    Sinus pain and swelling    Headache  It may not be allergies  Other health problems can cause symptoms like those of nasal allergies. These include:    Nonallergic rhinitis and viruses such as colds    Irritants and pollutants, such as strong odors or smoke    Certain medications    Changes in the weather   Treatment  Your health care provider will evaluate you to find the cause of your symptoms then recommend treatment. You may also be referred to an allergist.     3633-4086 [employer]. [address] [place], [place] [address]. All rights reserved. This information is not intended as a substitute for professional medical care. Always follow your healthcare professional's instructions.        The Herpes Virus  Herpes is a virus that can cause sores on the skin. There are two types of the virus. Depending on how you come in contact with the virus, either type can cause outbreaks near the mouth or on the sex organs.  Understanding the herpes virus  Herpes reproduces only when it is inside the body. It does so by tricking a healthy cell into producing copies of the herpes virus. Each copy can infect nearby cells. But, before too long, the body s defenses rally to stop the attack. The immune system forces the virus to retreat. For some people, an acute outbreak never happens again. For others, menstruation, illness, poor diet, fatigue, or stress makes outbreaks more likely.    How the herpes virus attacks  1. The herpes virus enters the body through a small break in the skin. The virus can also enter by direct contact with mucous membranes, such as those of the lips, vagina, or anus.  2. Inside the body, the herpes virus binds to a special site on a skin cell. Then part of the virus moves into the cell.  3. Inside the skin cell, the virus releases a set  of instructions. These commands cause the cell to begin making copies of the herpes virus.  4. Herpes blisters appear on the skin. Herpes blisters may also appear on mucous membranes lining the mouth, vagina, or anus.    2644-3169 [employer]. [address] [place] [address], [place] [address]. All rights reserved. This information is not intended as a substitute for professional medical care. Always follow your healthcare professional's instructions.

## 2017-05-08 NOTE — MR AVS SNAPSHOT
After Visit Summary   5/8/2017    Paul Mckeon    MRN: [id number]           Patient Information     Date Of Birth          1975        Visit Information        Provider Department      5/8/2017 3:15 PM LV FLOAT 1 [facility]        Today's Diagnoses     Acute bronchitis with coexisting condition requiring prophylactic treatment    -  1    Seasonal allergic rhinitis, unspecified allergic rhinitis trigger        Recurrent cold sores          Care Instructions      What Is Acute Bronchitis?  Acute or short-term bronchitis last for days or weeks. It occurs when the bronchial tubes (airways in the lungs) are irritated by a virus, bacteria, or allergen. This causes a cough that produces yellow or greenish mucus.  Inside healthy lungs    Air travels in and out of the lungs through the airways. The linings of these airways produce sticky mucus. This mucus traps particles that enter the lungs. Tiny structures called cilia then sweep the particles out of the airways.     Healthy airway: Airways are normally open. Air moves in and out easily.      Healthy cilia: Tiny, hairlike cilia sweep mucus and particles up and out of the airways.   Lungs with bronchitis  Bronchitis often occurs with a cold or the flu virus. The airways become inflamed (red and swollen). There is a deep  hacking  cough from the extra mucus. Other symptoms may include:    Wheezing    Chest discomfort    Shortness of breath    Mild fever  A second infection, this time due to bacteria, may then occur. And airways irritated by allergens or smoke are more likely to get infected.        Inflamed airway: Inflammation and extra mucus narrow the airway, causing shortness of breath.      Impaired cilia: Extra mucus impairs cilia, causing congestion and wheezing. Smoking makes the problem worse.   Making a diagnosis  A physical exam, health history, and certain tests help your healthcare provider make the  diagnosis.  Health history  Your healthcare provider will ask you about your symptoms.  The exam  Your provider listens to your chest for signs of congestion. He or she may also check your ears, nose, and throat.  Possible tests    A sputum test for bacteria. This requires a sample of mucus from the lungs.    A nasal or throat swab for bacterial infection.    A chest X-ray if your healthcare provider thinks you have pneumonia.    Tests to check for an underlying condition, such as allergies, asthma, or COPD. You may need to see a specialist for more lung function testing.  Treating a cough  The main treatment for bronchitis is easing symptoms. Avoiding smoke, allergens, and other things that trigger coughing can often help. If the infection is bacterial, you may be given antibiotics. During the illness, it's important to get plenty of sleep. To ease symptoms:    Don t smoke, and avoid secondhand smoke.    Use a humidifier, or breathe in steam from a hot shower. This may help loosen mucus.    Drink a lot of water and juice. They can soothe the throat and may help thin mucus.    Sit up or use extra pillows when in bed to help lessen coughing and congestion.    Ask your provider about using cough medicine, pain and fever medicine, or a decongestant.  Antibiotics  Most cases of bronchitis are caused by cold or flu viruses. Antibiotics don t treat viral illness. Taking antibiotics when they are not needed increases your risk of getting an infection later that is antibiotic-resistant. Your provider will prescribe antibiotics if the infection is caused by bacteria. If they are prescribed:    Take the medicine until it is used up, even if symptoms have improved. If you don t, the bronchitis may come back.    Take them as directed. For instance, some medicines should be taken with food.    Ask your provider or pharmacist what side effects are common, and what to do about them.  Follow-up care  You should see your provider  again in 2 to 3 weeks. By this time, symptoms should have improved. An infection that lasts longer may mean you have a more serious problem.  Prevention    Avoid tobacco smoke. If you smoke, quit. Stay away from smoky places. Ask friends and family not to smoke around you, or in your home or car.    Get checked for allergies.    Ask your provider about getting a yearly flu shot, and pneumococcal or pneumonia shots.    Wash your hands often. This helps reduce the chance of picking up viruses that cause colds and flu.  Call your healthcare provider if:    Symptoms worsen, or new symptoms develop.    Breathing problems worsen or  become severe.    Symptoms don t get better within a week, or within 3 days of taking antibiotics.     1534-0325 [employer]. [address] [place] [address], [place] [address]. All rights reserved. This information is not intended as a substitute for professional medical care. Always follow your healthcare professional's instructions.        Understanding Nasal Allergies  Nasal allergies (also called allergic rhinitis) are a common health problem. They may be seasonal.This means they cause symptoms only at certain times of the year. Or they may be perennial.This means they cause symptoms all year long. Other health problems, such as asthma, often occur along with allergies as well.    What Is an allergic reaction?  An allergy is a reaction to a substance called an allergen. Common allergens include:    Wind-borne pollen    Mold    Dust mites    Furry and feathered animals    Cockroaches  Normally, allergens are harmless. But when a person has allergies, their body thinks they are harmful. Their body then attacks allergens with antibodies. Antibodies are attached to special cells called mast cells. Allergens stick to the antibodies. This makes the mast cells release histamine and other chemicals. This is an allergic reaction. The chemicals irritate nearby nasal tissue. This causes nasal  allergy symptoms.  Common nasal allergy symptoms  Allergies can cause nasal tissue to swell. This makes the air passages smaller. The nose may feel stuffed up. The nose may also make extra mucus, which can plug the nasal passages or drip out of the nose. Mucus can drip down the back of the throat (postnasal drip) as well. Sinus tissue can swell. This may cause pain and headache. Common allergy symptoms include:    Runny nose with clear, watery discharge    Stuffy nose (nasal congestion)    Drainage down your throat (postnasal drip)    Sneezing    Red, watery eyes    Itchy nose, eyes, ears, and throat    Plugged-up ears (ear congestion)    Sore throat    Coughing    Sinus pain and swelling    Headache  It may not be allergies  Other health problems can cause symptoms like those of nasal allergies. These include:    Nonallergic rhinitis and viruses such as colds    Irritants and pollutants, such as strong odors or smoke    Certain medications    Changes in the weather   Treatment  Your health care provider will evaluate you to find the cause of your symptoms then recommend treatment. You may also be referred to an allergist.     5656-9255 [employer]. [address] [place] [address]. All rights reserved. This information is not intended as a substitute for professional medical care. Always follow your healthcare professional's instructions.        The Herpes Virus  Herpes is a virus that can cause sores on the skin. There are two types of the virus. Depending on how you come in contact with the virus, either type can cause outbreaks near the mouth or on the sex organs.  Understanding the herpes virus  Herpes reproduces only when it is inside the body. It does so by tricking a healthy cell into producing copies of the herpes virus. Each copy can infect nearby cells. But, before too long, the body s defenses rally to stop the attack. The immune system forces the virus to retreat. For some people,  an acute outbreak never happens again. For others, menstruation, illness, poor diet, fatigue, or stress makes outbreaks more likely.    How the herpes virus attacks  1. The herpes virus enters the body through a small break in the skin. The virus can also enter by direct contact with mucous membranes, such as those of the lips, vagina, or anus.  2. Inside the body, the herpes virus binds to a special site on a skin cell. Then part of the virus moves into the cell.  3. Inside the skin cell, the virus releases a set of instructions. These commands cause the cell to begin making copies of the herpes virus.  4. Herpes blisters appear on the skin. Herpes blisters may also appear on mucous membranes lining the mouth, vagina, or anus.    5735-4859 [employer]. [address] [place] [address], [place] [address]. All rights reserved. This information is not intended as a substitute for professional medical care. Always follow your healthcare professional's instructions.              Follow-ups after your visit        Who to contact     If you have questions or need follow up information about today's clinic visit or your schedule please contact [facility] directly at [phone].  Normal or non-critical lab and imaging results will be communicated to you by [email or web address], letter or phone within 4 business days after the clinic has received the results. If you do not hear from us within 7 days, please contact the clinic through [email or web address] or phone. If you have a critical or abnormal lab result, we will notify you by phone as soon as possible.  Submit refill requests through [employer] or call your pharmacy and they will forward the refill request to us. Please allow 3 business days for your refill to be completed.          Additional Information About Your Visit        [email or web address] Information     [employer] gives you secure access to your electronic health record. If you see a primary care provider, you can also send  "messages to your care team and make appointments. If you have questions, please call your primary care clinic.  If you do not have a primary care provider, please call [phone] and they will assist you.        Care EveryWhere ID     This is your Care EveryWhere ID. This could be used by other organizations to access your [facility] medical records  [id number]        Your Vitals Were     Pulse Temperature Height Pulse Oximetry BMI (Body Mass Index)       81 97.4  F (36.3  C) (Oral) 6' 2\" (1.88 m) 95% 31.2 kg/m2        Blood Pressure from Last 3 Encounters:   05/08/17 128/80   01/04/17 121/80   12/12/16 134/82    Weight from Last 3 Encounters:   05/08/17 243 lb (110.2 kg)   01/04/17 240 lb (108.9 kg)   12/12/16 250 lb 6.4 oz (113.6 kg)              Today, you had the following     No orders found for display         Today's Medication Changes          These changes are accurate as of: 5/8/17  3:15 PM.  If you have any questions, ask your nurse or doctor.               Start taking these medicines.        Dose/Directions    acyclovir 400 MG tablet   Commonly known as:  ZOVIRAX   Used for:  Recurrent cold sores        Dose:  400 mg   Take 1 tablet (400 mg) by mouth 2 times daily   Quantity:  90 tablet   Refills:  0       doxycycline 100 MG capsule   Commonly known as:  VIBRAMYCIN   Used for:  Acute bronchitis with coexisting condition requiring prophylactic treatment        Dose:  100 mg   Take 1 capsule (100 mg) by mouth 2 times daily for 10 days   Quantity:  20 capsule   Refills:  0       fluticasone 50 MCG/ACT spray   Commonly known as:  FLONASE   Used for:  Seasonal allergic rhinitis, unspecified allergic rhinitis trigger        Dose:  1-2 spray   Spray 1-2 sprays into both nostrils daily   Quantity:  16 g   Refills:  0            Where to get your medicines      These medications were sent to [place] [address] [place] [facility] [address] [address] [address] [facility] [address]    Hours:  Tech " issues with their phone system Phone:  [phone]     acyclovir 400 MG tablet    doxycycline 100 MG capsule    fluticasone 50 MCG/ACT spray                Primary Care Provider Office Phone # Fax #    Shiraz Gil [phone]       [facility] [address]  [facility] [address]        Thank you!     Thank you for choosing [facility]  for your care. Our goal is always to provide you with excellent care. Hearing back from our patients is one way we can continue to improve our services. Please take a few minutes to complete the written survey that you may receive in the mail after your visit with us. Thank you!             Your Updated Medication List - Protect others around you: Learn how to safely use, store and throw away your medicines at www.disposemymeds.org.          This list is accurate as of: 5/8/17  3:15 PM.  Always use your most recent med list.                   Brand Name Dispense Instructions for use    acyclovir 400 MG tablet    ZOVIRAX    90 tablet    Take 1 tablet (400 mg) by mouth 2 times daily       buPROPion 150 MG 12 hr tablet    WELLBUTRIN SR    60 tablet    Take 1 tablet once daily and increase to 1 tablet twice daily after 4 to 7 days       diltiazem 2% in PLO cream (FV COMPOUNDED) 2% Gel     30 g    Apply pea sized amount 2-3 times daily for 6 weeks       doxycycline 100 MG capsule    VIBRAMYCIN    20 capsule    Take 1 capsule (100 mg) by mouth 2 times daily for 10 days       fluticasone 50 MCG/ACT spray    FLONASE    16 g    Spray 1-2 sprays into both nostrils daily       * ibuprofen 800 MG tablet    ADVIL/MOTRIN    90 tablet    Take 1 tablet (800 mg) by mouth every 8 hours as needed for moderate pain       * ibuprofen 800 MG tablet    ADVIL/MOTRIN    30 tablet    Take 1 tablet (800 mg) by mouth every 8 hours as needed for moderate pain       lidocaine 5 % ointment    XYLOCAINE    35.44 g    Apply 2-3 times daily for pain       oxyCODONE  5 MG IR tablet    ROXICODONE    20 tablet    Take 1 tablet (5 mg) by mouth every 6 hours as needed for moderate to severe pain       * Notice:  This list has 2 medication(s) that are the same as other medications prescribed for you. Read the directions carefully, and ask your doctor or other care provider to review them with you.

## 2017-05-08 NOTE — PROGRESS NOTES
SUBJECTIVE:                                                    Paul Mckeon is a 41 year old male who presents to clinic today for the following health issues:       Acute Illness   Acute illness concerns: sinus / sore throat   Onset: last Saturday -  9 days ago    Fever: no    Chills/Sweats: YES    Headache (location?): YES    Sinus Pressure:YES    Conjunctivitis:  YES- mucus    Ear Pain: no    Rhinorrhea: no    Congestion: YES    Sore Throat: YES     Cough: YES-productive of yellow sputum, productive of green sputum    Wheeze: no     Decreased Appetite: YES    Nausea: no    Vomiting: no    Diarrhea:  YES    Dysuria/Freq.: no    Fatigue/Achiness: YES    Sick/Strep Exposure: no          Paul Mckeon is a 41 year old male who presents to the clinic today with a chief complaint of cough  and central chest pain. for 9 day(s).  Patient denies shortness of breath., pleuritic chest pain and wheezing.  His cough is described as persistent and productive green and mucoid.    The patient's symptoms are moderate and worsening.  Associated symptoms include nasal congestion, rhinorrhea and malaise. The patient's symptoms are exacerbated by no particular triggers  Patient has been using decongestants and OTC cough suppressants  to improve symptoms.  Has spring allergies.  Has been having recurrent cold sores, about every 6 weeks, would like to be on some type of treatment to suppress cold sores.    History reviewed. No pertinent past medical history.    ALLERGIES:  Seasonal allergies      Current Outpatient Prescriptions on File Prior to Visit:  buPROPion (WELLBUTRIN SR) 150 MG 12 hr tablet Take 1 tablet once daily and increase to 1 tablet twice daily after 4 to 7 days   ibuprofen (ADVIL,MOTRIN) 800 MG tablet Take 1 tablet (800 mg) by mouth every 8 hours as needed for moderate pain   ibuprofen (ADVIL,MOTRIN) 800 MG tablet Take 1 tablet (800 mg) by mouth every 8 hours as needed for moderate pain   diltiazem 2% in PLO cream,  "FV COMPOUNDED, 2% GEL Apply pea sized amount 2-3 times daily for 6 weeks (Patient not taking: Reported on 5/8/2017)   lidocaine (XYLOCAINE) 5 % ointment Apply 2-3 times daily for pain (Patient not taking: Reported on 5/8/2017)   oxyCODONE (ROXICODONE) 5 MG IR tablet Take 1 tablet (5 mg) by mouth every 6 hours as needed for moderate to severe pain (Patient not taking: Reported on 5/8/2017)     No current facility-administered medications on file prior to visit.     Social History   Substance Use Topics     Smoking status: Former Smoker     Types: Cigarettes     Smokeless tobacco: Current User     Types: Chew      Comment: quit smoking in early 2000's and quit chew in 2013     Alcohol use 8.4 oz/week     14 Standard drinks or equivalent per week      Comment: 2-3 days per week, 2-6 drinks per occasion       Family History   Problem Relation Age of Onset     Thyroid Disease Mother      Hypertension Mother      Obesity Mother      Respiratory Mother      EB     HEART DISEASE Father      Arrythmia     Respiratory Father      EB     Thyroid Disease Maternal Grandmother          ROS  INTEGUMENTARY/SKIN: NEGATIVE for worrisome rashes, moles or lesions  EYES: NEGATIVE for vision changes or irritation  GI: NEGATIVE for nausea, abdominal pain, heartburn, or change in bowel habits    OBJECTIVE:  /80 (BP Location: Right arm, Patient Position: Chair, Cuff Size: Adult Large)  Pulse 81  Temp 97.4  F (36.3  C) (Oral)  Ht 6' 2\" (1.88 m)  Wt 243 lb (110.2 kg)  SpO2 95%  BMI 31.2 kg/m2  GENERAL APPEARANCE: alert, mild distress and cooperative  EYES: EOMI,  PERRL, conjunctiva clear  HENT: ear canals and TM's normal.  Nose and mouth without ulcers, erythema or lesions  NECK: supple, nontender, no lymphadenopathy  RESP: lungs clear to auscultation - no rales, rhonchi or wheezes  CV: regular rates and rhythm, normal S1 S2, no murmur noted  NEURO: Normal strength and tone, sensory exam grossly normal,  normal speech and " mentation  SKIN: no suspicious lesions or rashes         ASSESSMENT:    Acute bronchitis with coexisting condition requiring prophylactic treatment     - doxycycline (VIBRAMYCIN) 100 MG capsule; Take 1 capsule (100 mg) by mouth 2 times daily for 10 days    Symptomatic measures encouraged, humidified air, plenty of fluids.  Patient may consider OTC expectorant and/or cough suppressant to treat symptoms.      Seasonal allergic rhinitis, unspecified allergic rhinitis trigger     - fluticasone (FLONASE) 50 MCG/ACT spray; Spray 1-2 sprays into both nostrils daily  Also use OTC antihistamines    Recurrent cold sores     - acyclovir (ZOVIRAX) 400 MG tablet; Take 1 tablet (400 mg) by mouth 2 times daily         Return if worsening

## 2017-05-08 NOTE — NURSING NOTE
"Chief Complaint   Patient presents with     Sinus Problem       Initial /80 (BP Location: Right arm, Patient Position: Chair, Cuff Size: Adult Large)  Pulse 81  Temp 97.4  F (36.3  C) (Oral)  Ht 6' 2\" (1.88 m)  Wt 243 lb (110.2 kg)  SpO2 95%  BMI 31.2 kg/m2 Estimated body mass index is 31.2 kg/(m^2) as calculated from the following:    Height as of this encounter: 6' 2\" (1.88 m).    Weight as of this encounter: 243 lb (110.2 kg).  Medication Reconciliation: complete Doris Coreas CMA      "

## 2017-07-04 DIAGNOSIS — F17.200 TOBACCO USE DISORDER: ICD-10-CM

## 2017-07-05 RX ORDER — BUPROPION HYDROCHLORIDE 150 MG/1
150 TABLET, EXTENDED RELEASE ORAL 2 TIMES DAILY
Qty: 60 TABLET | Refills: 3 | Status: SHIPPED | OUTPATIENT
Start: 2017-07-05 | End: 2017-11-13

## 2017-07-05 NOTE — TELEPHONE ENCOUNTER
LMTRC  Has patient increased to 1 tablet BID?  How is he doing with smoking cessation   Michael Rios RN, BSN

## 2017-07-05 NOTE — TELEPHONE ENCOUNTER
"Pt reports has not smoked since September 2016.   Yes taking bupropion BID.   Reports he had stopped taking bupropion for a while but restarted when \"those smoking urges came back again\"    Since restart urges stopped so \"it is working really well\"  4 days pills at hand.   Informed refills approved.     4 months sent - last OV 5/8/17  Sbe Umanzor RN    "

## 2017-07-05 NOTE — TELEPHONE ENCOUNTER
Pending Prescriptions:                       Disp   Refills    buPROPion (WELLBUTRIN SR) 150 MG 12 hr ta*60 tab*2            Sig: TAKE 1 TABLET ONCE DAILY AND INCREASE TO 1 TABLET           TWICE DAILY AFTER 4 TO 7 DAYS           Last Written Prescription Date: 03/14/2017  Last Fill Quantity: 60; # refills: 2  Last Office Visit with [facility] or [facility] prescribing provider:  12/12/2016   Last PHQ-9 score on record= No flowsheet data found.    Lab Results   Component Value Date    AST 19 01/04/2017     Lab Results   Component Value Date    ALT 38 01/04/2017     Charly MONTANO

## 2017-07-06 DIAGNOSIS — B00.1 RECURRENT COLD SORES: ICD-10-CM

## 2017-07-06 NOTE — TELEPHONE ENCOUNTER
Routing refill request to provider for review/approval because:  Prescribed by  provider    Acyclovir     Last Written Prescription Date: 5/8/17  Last Fill Quantity: 90, # refills: 0  Last Office Visit with [facility] or [facility] prescribing provider: 5/8/17        Creatinine   Date Value Ref Range Status   01/04/2017 1.10 0.66 - 1.25 mg/dL Final

## 2017-07-07 RX ORDER — ACYCLOVIR 400 MG/1
400 TABLET ORAL 2 TIMES DAILY
Qty: 90 TABLET | Refills: 0 | Status: SHIPPED | OUTPATIENT
Start: 2017-07-07 | End: 2017-09-12

## 2017-09-12 DIAGNOSIS — B00.1 RECURRENT COLD SORES: ICD-10-CM

## 2017-09-12 RX ORDER — ACYCLOVIR 400 MG/1
TABLET ORAL
Qty: 90 TABLET | Refills: 0 | Status: SHIPPED | OUTPATIENT
Start: 2017-09-12 | End: 2017-11-13

## 2017-09-12 NOTE — TELEPHONE ENCOUNTER
Prescription approved per [facility] according to the [facility] Refill Protocol.  Rose Villalobos RN

## 2017-09-12 NOTE — TELEPHONE ENCOUNTER
Pending Prescriptions:                       Disp   Refills    acyclovir (ZOVIRAX) 400 MG tablet [Pharma*90 tab*0            Sig: TAKE 1 TABLET (400 MG) BY MOUTH 2 TIMES DAILY         Last Written Prescription Date: 07/07/2017  Last Fill Quantity: 90, # refills: 0  Last Office Visit with [facility], [facility] or [facility] prescribing provider: 12/12/2016        Creatinine   Date Value Ref Range Status   01/04/2017 1.10 0.66 - 1.25 mg/dL Miki MONTANO

## 2017-11-11 DIAGNOSIS — F17.200 TOBACCO USE DISORDER: ICD-10-CM

## 2017-11-11 RX ORDER — BUPROPION HYDROCHLORIDE 150 MG/1
TABLET, EXTENDED RELEASE ORAL
Qty: 60 TABLET | Refills: 3 | Status: CANCELLED | OUTPATIENT
Start: 2017-11-11

## 2017-11-13 ENCOUNTER — OFFICE VISIT (OUTPATIENT)
Dept: FAMILY MEDICINE | Facility: CLINIC | Age: 42
End: 2017-11-13
Payer: COMMERCIAL

## 2017-11-13 VITALS
WEIGHT: 226.3 LBS | BODY MASS INDEX: 29.04 KG/M2 | SYSTOLIC BLOOD PRESSURE: 120 MMHG | OXYGEN SATURATION: 97 % | TEMPERATURE: 98.4 F | DIASTOLIC BLOOD PRESSURE: 80 MMHG | RESPIRATION RATE: 18 BRPM | HEIGHT: 74 IN | HEART RATE: 81 BPM

## 2017-11-13 DIAGNOSIS — Z23 NEED FOR PROPHYLACTIC VACCINATION AND INOCULATION AGAINST INFLUENZA: ICD-10-CM

## 2017-11-13 DIAGNOSIS — Z00.00 ROUTINE GENERAL MEDICAL EXAMINATION AT A HEALTH CARE FACILITY: ICD-10-CM

## 2017-11-13 DIAGNOSIS — B00.1 RECURRENT COLD SORES: ICD-10-CM

## 2017-11-13 DIAGNOSIS — F17.200 TOBACCO USE DISORDER: ICD-10-CM

## 2017-11-13 LAB
CHOLEST SERPL-MCNC: 183 MG/DL
GLUCOSE SERPL-MCNC: 98 MG/DL (ref 70–99)
HDLC SERPL-MCNC: 51 MG/DL
LDLC SERPL CALC-MCNC: 115 MG/DL
NONHDLC SERPL-MCNC: 132 MG/DL
TRIGL SERPL-MCNC: 86 MG/DL

## 2017-11-13 PROCEDURE — 82947 ASSAY GLUCOSE BLOOD QUANT: CPT | Performed by: FAMILY MEDICINE

## 2017-11-13 PROCEDURE — 36415 COLL VENOUS BLD VENIPUNCTURE: CPT | Performed by: FAMILY MEDICINE

## 2017-11-13 PROCEDURE — 90686 IIV4 VACC NO PRSV 0.5 ML IM: CPT | Performed by: FAMILY MEDICINE

## 2017-11-13 PROCEDURE — 99396 PREV VISIT EST AGE 40-64: CPT | Mod: 25 | Performed by: FAMILY MEDICINE

## 2017-11-13 PROCEDURE — 80061 LIPID PANEL: CPT | Performed by: FAMILY MEDICINE

## 2017-11-13 PROCEDURE — 90471 IMMUNIZATION ADMIN: CPT | Performed by: FAMILY MEDICINE

## 2017-11-13 RX ORDER — ACYCLOVIR 400 MG/1
TABLET ORAL
Qty: 180 TABLET | Refills: 4 | Status: SHIPPED | OUTPATIENT
Start: 2017-11-13 | End: 2018-12-19

## 2017-11-13 RX ORDER — BUPROPION HYDROCHLORIDE 150 MG/1
150 TABLET, EXTENDED RELEASE ORAL 2 TIMES DAILY
Qty: 180 TABLET | Refills: 4 | Status: SHIPPED | OUTPATIENT
Start: 2017-11-13 | End: 2018-12-19

## 2017-11-13 NOTE — MR AVS SNAPSHOT
After Visit Summary   11/13/2017    Paul Mckeon    MRN: [id number]           Patient Information     Date Of Birth          1975        Visit Information        Provider Department      11/13/2017 8:00 AM Shiraz Gil MD [facility]        Today's Diagnoses     Routine general medical examination at a health care facility        Recurrent cold sores        Need for prophylactic vaccination and inoculation against influenza        Tobacco use disorder          Care Instructions      Preventive Health Recommendations  Male Ages 40 to 49    Yearly exam:             See your health care provider every year in order to  o   Review health changes.   o   Discuss preventive care.    o   Review your medicines if your doctor has prescribed any.    You should be tested each year for STDs (sexually transmitted diseases) if you re at risk.     Have a cholesterol test every 5 years.     Have a colonoscopy (test for colon cancer) if someone in your family has had colon cancer or polyps before age 50.     After age 45, have a diabetes test (fasting glucose). If you are at risk for diabetes, you should have this test every 3 years.      Talk with your health care provider about whether or not a prostate cancer screening test (PSA) is right for you.    Shots: Get a flu shot each year. Get a tetanus shot every 10 years.     Nutrition:    Eat at least 5 servings of fruits and vegetables daily.     Eat whole-grain bread, whole-wheat pasta and brown rice instead of white grains and rice.     Talk to your provider about Calcium and Vitamin D.     Lifestyle    Exercise for at least 150 minutes a week (30 minutes a day, 5 days a week). This will help you control your weight and prevent disease.     Limit alcohol to one drink per day.     No smoking.     Wear sunscreen to prevent skin cancer.     See your dentist every six months for an exam and cleaning.              Follow-ups after your visit       "  Who to contact     If you have questions or need follow up information about today's clinic visit or your schedule please contact [facility] directly at [phone].  Normal or non-critical lab and imaging results will be communicated to you by [employer], letter or phone within 4 business days after the clinic has received the results. If you do not hear from us within 7 days, please contact the clinic through [employer] or phone. If you have a critical or abnormal lab result, we will notify you by phone as soon as possible.  Submit refill requests through [employer] or call your pharmacy and they will forward the refill request to us. Please allow 3 business days for your refill to be completed.          Additional Information About Your Visit        [employer] Information     [employer] gives you secure access to your electronic health record. If you see a primary care provider, you can also send messages to your care team and make appointments. If you have questions, please call your primary care clinic.  If you do not have a primary care provider, please call [phone] and they will assist you.        Care EveryWhere ID     This is your Care EveryWhere ID. This could be used by other organizations to access your [place] medical records  [id number]        Your Vitals Were     Pulse Temperature Respirations Height Pulse Oximetry BMI (Body Mass Index)    81 98.4  F (36.9  C) (Oral) 18 6' 2\" (1.88 m) 97% 29.06 kg/m2       Blood Pressure from Last 3 Encounters:   11/13/17 120/80   05/08/17 128/80   01/04/17 121/80    Weight from Last 3 Encounters:   11/13/17 226 lb 4.8 oz (102.6 kg)   05/08/17 243 lb (110.2 kg)   01/04/17 240 lb (108.9 kg)              We Performed the Following     Glucose     Lipid panel reflex to direct LDL Fasting          Today's Medication Changes          These changes are accurate as of: 11/13/17  8:27 AM.  If you have any questions, ask your nurse or doctor.               These " medicines have changed or have updated prescriptions.        Dose/Directions    acyclovir 400 MG tablet   Commonly known as:  ZOVIRAX   This may have changed:  See the new instructions.   Used for:  Recurrent cold sores   Changed by:  Shiraz Gil MD        TAKE 1 TABLET (400 MG) BY MOUTH 2 TIMES DAILY   Quantity:  180 tablet   Refills:  4            Where to get your medicines      These medications were sent to [place] [address] [place] [facility] [address] [facility]  [address] [facility] [address]    Hours:  Tech issues with their phone system Phone:  [phone]     acyclovir 400 MG tablet    buPROPion 150 MG 12 hr tablet                Primary Care Provider Office Phone # Fax #    Shiraz Gil [phone] [address]  [facility] [address]        Equal Access to Services     [facility]: Hadii libby torres hadasho Soomaali, waaxda luqadaha, qaybta kaalmada adeegyada, waxay rodolfo nelson . So [facility] [phone].    ATENCIÓN: Si habla español, tiene a villavicencio disposición servicios gratuitos de asistencia lingüística. [facility] [phone].    We comply with applicable federal civil rights laws and [place] laws. We do not discriminate on the basis of race, color, national origin, age, disability, sex, sexual orientation, or gender identity.            Thank you!     Thank you for choosing [facility]  for your care. Our goal is always to provide you with excellent care. Hearing back from our patients is one way we can continue to improve our services. Please take a few minutes to complete the written survey that you may receive in the mail after your visit with us. Thank you!             Your Updated Medication List - Protect others around you: Learn how to safely use, store and throw away your medicines at www.disposemymeds.org.          This list is accurate as of: 11/13/17  8:27 AM.  Always use your most recent med list.                    Brand Name Dispense Instructions for use Diagnosis    acyclovir 400 MG tablet    ZOVIRAX    180 tablet    TAKE 1 TABLET (400 MG) BY MOUTH 2 TIMES DAILY    Recurrent cold sores       buPROPion 150 MG 12 hr tablet    WELLBUTRIN SR    180 tablet    Take 1 tablet (150 mg) by mouth 2 times daily    Tobacco use disorder       CENTRUM MEN PO

## 2017-11-13 NOTE — NURSING NOTE
"Chief Complaint   Patient presents with     Physical       Initial /80 (BP Location: Right arm, Patient Position: Chair, Cuff Size: Adult Large)  Pulse 81  Temp 98.4  F (36.9  C) (Oral)  Resp 18  Ht 6' 2\" (1.88 m)  Wt 226 lb 4.8 oz (102.6 kg)  SpO2 97%  BMI 29.06 kg/m2 Estimated body mass index is 29.06 kg/(m^2) as calculated from the following:    Height as of this encounter: 6' 2\" (1.88 m).    Weight as of this encounter: 226 lb 4.8 oz (102.6 kg).  Medication Reconciliation: complete      Health Maintenance addressed:  NONE    N/a    SAGRARIO Flowers        "

## 2017-11-13 NOTE — PROGRESS NOTES

## 2017-11-13 NOTE — PROGRESS NOTES
SUBJECTIVE:   CC: Paul Mckeon is an 42 year old male who presents for preventative health visit.     Physical   Annual:     Getting at least 3 servings of Calcium per day::  Yes    Bi-annual eye exam::  Yes    Dental care twice a year::  Yes    Sleep apnea or symptoms of sleep apnea::  None    Diet::  Regular (no restrictions)    Frequency of exercise::  4-5 days/week    Duration of exercise::  45-60 minutes    Taking medications regularly::  Yes    Medication side effects::  None    Additional concerns today::  YES    Patient would like automatic refill for acyclovir, which he takes preventatively for cold sores.     Patient reports a lesion on his back, which has grown recently.     Today's PHQ-2 Score:   PHQ-2 ( 1999 Pfizer) 11/13/2017   Q1: Little interest or pleasure in doing things 0   Q2: Feeling down, depressed or hopeless 0   PHQ-2 Score 0   Q1: Little interest or pleasure in doing things Not at all   Q2: Feeling down, depressed or hopeless Not at all   PHQ-2 Score 0       Abuse: Current or Past(Physical, Sexual or Emotional)- No  Do you feel safe in your environment - Yes    Social History   Substance Use Topics     Smoking status: Former Smoker     Types: Cigarettes     Smokeless tobacco: Current User     Types: Chew      Comment: quit smoking in early 2000's and quit chew in 2013     Alcohol use 1.2 oz/week     2 Standard drinks or equivalent per week     The patient does not drink >3 drinks per day nor >7 drinks per week.    Last PSA: No results found for: PSA    Reviewed orders with patient. Reviewed health maintenance and updated orders accordingly - Yes  Patient Active Problem List   Diagnosis     Elevated blood pressure     Elevated fasting glucose     Tobacco use disorder     Past Surgical History:   Procedure Laterality Date     ARTHROSCOPIC RECONSTRUCTION ANTERIOR AND POSTERIOR CRUCIATE LIGAMENT, COMBINED       HAND SURGERY       TONSILLECTOMY         Social History   Substance Use Topics      Smoking status: Former Smoker     Types: Cigarettes     Smokeless tobacco: Current User     Types: Chew      Comment: quit smoking in early 2000's and quit chew in 2013     Alcohol use 1.2 oz/week     2 Standard drinks or equivalent per week     Family History   Problem Relation Age of Onset     Thyroid Disease Mother      Hypertension Mother      Obesity Mother      Respiratory Mother      EB     HEART DISEASE Father      Arrythmia     Respiratory Father      EB     Thyroid Disease Maternal Grandmother              Reviewed and updated as needed this visit by clinical staffTobacco  Allergies  Meds  Med Hx  Surg Hx  Fam Hx  Soc Hx        Reviewed and updated as needed this visit by Provider        History reviewed. No pertinent past medical history.   Past Surgical History:   Procedure Laterality Date     ARTHROSCOPIC RECONSTRUCTION ANTERIOR AND POSTERIOR CRUCIATE LIGAMENT, COMBINED       HAND SURGERY       TONSILLECTOMY         Review of Systems  C: NEGATIVE for fever, chills, change in weight  I: as above, otherwise NEGATIVE for worrisome rashes, moles or lesions  E: NEGATIVE for vision changes or irritation  ENT: NEGATIVE for ear, mouth and throat problems  R: NEGATIVE for significant cough or SOB  CV: NEGATIVE for chest pain, palpitations or peripheral edema  GI: NEGATIVE for nausea, abdominal pain, heartburn, or change in bowel habits   male: negative for dysuria, hematuria, decreased urinary stream, erectile dysfunction, urethral discharge  M: NEGATIVE for significant arthralgias or myalgia  N: NEGATIVE for weakness, dizziness or paresthesias  P: NEGATIVE for changes in mood or affect    This document serves as a record of the services and decisions personally performed and made by Shiraz Gil MD. It was created on his behalf by Madelyn Velarde, a trained medical scribe. The creation of this document is based on the provider's statements to the medical scribe.  Madelyn Velarde 8:14 AM November 13,  "2017    OBJECTIVE:   /80 (BP Location: Right arm, Patient Position: Chair, Cuff Size: Adult Large)  Pulse 81  Temp 98.4  F (36.9  C) (Oral)  Resp 18  Ht 1.88 m (6' 2\")  Wt 102.6 kg (226 lb 4.8 oz)  SpO2 97%  BMI 29.06 kg/m2    Physical Exam  GENERAL: healthy, alert and no distress  EYES: Eyes grossly normal to inspection, PERRL and conjunctivae and sclerae normal  HENT: ear canals and TM's normal, nose and mouth without ulcers or lesions  NECK: no adenopathy, no asymmetry, masses, or scars and thyroid normal to palpation  RESP: lungs clear to auscultation - no rales, rhonchi or wheezes  CV: regular rate and rhythm, normal S1 S2, no S3 or S4, no murmur, click or rub, no peripheral edema and peripheral pulses strong  ABDOMEN: soft, nontender, no hepatosplenomegaly, no masses and bowel sounds normal   (male): normal male genitalia without lesions or urethral discharge, no hernia  MS: no gross musculoskeletal defects noted, no edema  SKIN:skin colored mole on back, otherwise no suspicious lesions or rashes  NEURO: Normal strength and tone, mentation intact and speech normal  PSYCH: mentation appears normal, affect normal/bright    ASSESSMENT/PLAN:   1. Routine general medical examination at a health care facility  - Lipid panel reflex to direct LDL Fasting  - Glucose    2. Recurrent cold sores  - acyclovir (ZOVIRAX) 400 MG tablet; TAKE 1 TABLET (400 MG) BY MOUTH 2 TIMES DAILY  Dispense: 180 tablet; Refill: 4    3. Need for prophylactic vaccination and inoculation against influenza  Administered in clinic.     4. Tobacco use disorder  - buPROPion (WELLBUTRIN SR) 150 MG 12 hr tablet; Take 1 tablet (150 mg) by mouth 2 times daily  Dispense: 180 tablet; Refill: 4    COUNSELING:   Reviewed preventive health counseling, as reflected in patient instructions       Regular exercise       Healthy diet/nutrition       Immunizations    Vaccinated for: Influenza       reports that he has quit smoking. His smoking use " "included Cigarettes. His smokeless tobacco use includes Chew.      Estimated body mass index is 29.06 kg/(m^2) as calculated from the following:    Height as of this encounter: 1.88 m (6' 2\").    Weight as of this encounter: 102.6 kg (226 lb 4.8 oz).   Weight management plan: Discussed healthy diet and exercise guidelines and patient will follow up in 12 months in clinic to re-evaluate.    Counseling Resources:  ATP IV Guidelines  Pooled Cohorts Equation Calculator  FRAX Risk Assessment  ICSI Preventive Guidelines  Dietary Guidelines for Americans, 2010  USDA's MyPlate  ASA Prophylaxis  Lung CA Screening    The information in this document, created by the medical scribe for me, accurately reflects the services I personally performed and the decisions made by me. I have reviewed and approved this document for accuracy prior to leaving the patient care area.  November 13, 2017 8:28 AM    Shiraz Gil MD  [facility]  Answers for HPI/ROS submitted by the patient on 11/13/2017   PHQ-2 Score: 0    "

## 2018-01-16 ENCOUNTER — TRANSFERRED RECORDS (OUTPATIENT)
Dept: HEALTH INFORMATION MANAGEMENT | Facility: CLINIC | Age: 43
End: 2018-01-16

## 2018-12-19 ENCOUNTER — OFFICE VISIT (OUTPATIENT)
Dept: FAMILY MEDICINE | Facility: CLINIC | Age: 43
End: 2018-12-19
Payer: COMMERCIAL

## 2018-12-19 VITALS
WEIGHT: 230.1 LBS | OXYGEN SATURATION: 97 % | TEMPERATURE: 98.9 F | SYSTOLIC BLOOD PRESSURE: 132 MMHG | HEART RATE: 83 BPM | DIASTOLIC BLOOD PRESSURE: 80 MMHG | BODY MASS INDEX: 29.53 KG/M2 | HEIGHT: 74 IN

## 2018-12-19 DIAGNOSIS — Z00.00 ROUTINE GENERAL MEDICAL EXAMINATION AT A HEALTH CARE FACILITY: Primary | ICD-10-CM

## 2018-12-19 DIAGNOSIS — Z23 NEED FOR PROPHYLACTIC VACCINATION AND INOCULATION AGAINST INFLUENZA: ICD-10-CM

## 2018-12-19 DIAGNOSIS — B00.1 RECURRENT COLD SORES: ICD-10-CM

## 2018-12-19 PROCEDURE — 90471 IMMUNIZATION ADMIN: CPT | Performed by: FAMILY MEDICINE

## 2018-12-19 PROCEDURE — 99396 PREV VISIT EST AGE 40-64: CPT | Mod: 25 | Performed by: FAMILY MEDICINE

## 2018-12-19 PROCEDURE — 90686 IIV4 VACC NO PRSV 0.5 ML IM: CPT | Performed by: FAMILY MEDICINE

## 2018-12-19 RX ORDER — ACYCLOVIR 400 MG/1
TABLET ORAL
Qty: 180 TABLET | Refills: 4 | Status: SHIPPED | OUTPATIENT
Start: 2018-12-19 | End: 2020-03-09

## 2018-12-19 ASSESSMENT — ENCOUNTER SYMPTOMS
PARESTHESIAS: 0
FEVER: 0
DYSURIA: 0
EYE PAIN: 0
MYALGIAS: 0
NAUSEA: 0
COUGH: 0
SHORTNESS OF BREATH: 0
PALPITATIONS: 0
SORE THROAT: 0
HEARTBURN: 0
HEADACHES: 0
ABDOMINAL PAIN: 0
CHILLS: 0
HEMATOCHEZIA: 0
NERVOUS/ANXIOUS: 0
HEMATURIA: 0
DIZZINESS: 0
WEAKNESS: 0
FREQUENCY: 0
CONSTIPATION: 1
JOINT SWELLING: 0
ARTHRALGIAS: 0
DIARRHEA: 1

## 2018-12-19 ASSESSMENT — MIFFLIN-ST. JEOR: SCORE: 2013.48

## 2019-02-13 DIAGNOSIS — F17.200 TOBACCO USE DISORDER: ICD-10-CM

## 2019-02-13 RX ORDER — BUPROPION HYDROCHLORIDE 150 MG/1
TABLET, EXTENDED RELEASE ORAL
Qty: 180 TABLET | Refills: 4 | OUTPATIENT
Start: 2019-02-13

## 2019-02-13 NOTE — TELEPHONE ENCOUNTER
"Requested Prescriptions   Pending Prescriptions Disp Refills       Medication discontinued on 12/19/2018    buPROPion (WELLBUTRIN SR) 150 MG 12 hr tablet [Pharmacy Med Name: BUPROPION HCL  MG TABLET] 180 tablet 4    Last Written Prescription Date:  11/13/2017  Last Fill Quantity: 180 tablet,  # refills: 4   Last office visit: 12/19/2018 with prescribing provider:  12/19/2018   Future Office Visit:     Sig: TAKE 1 TABLET BY MOUTH 2 TIMES DAILY    SSRIs Protocol Failed - 2/13/2019  1:28 AM       Failed - Medication is active on med list       Passed - Recent (12 mo) or future (30 days) visit within the authorizing provider's specialty    Patient had office visit in the last 12 months or has a visit in the next 30 days with authorizing provider or within the authorizing provider's specialty.  See \"Patient Info\" tab in inbasket, or \"Choose Columns\" in Meds & Orders section of the refill encounter.             Passed - Medication is Bupropion    If the medication is Bupropion (Wellbutrin), and the patient is taking for smoking cessation; OK to refill.         Passed - Patient is age 18 or older          Charly ROBERTST  "

## 2019-12-20 ENCOUNTER — OFFICE VISIT (OUTPATIENT)
Dept: FAMILY MEDICINE | Facility: CLINIC | Age: 44
End: 2019-12-20
Payer: COMMERCIAL

## 2019-12-20 VITALS
HEIGHT: 74 IN | TEMPERATURE: 99.1 F | DIASTOLIC BLOOD PRESSURE: 70 MMHG | WEIGHT: 246.7 LBS | HEART RATE: 83 BPM | BODY MASS INDEX: 31.66 KG/M2 | OXYGEN SATURATION: 94 % | RESPIRATION RATE: 16 BRPM | SYSTOLIC BLOOD PRESSURE: 120 MMHG

## 2019-12-20 DIAGNOSIS — F17.200 NICOTINE USE DISORDER: ICD-10-CM

## 2019-12-20 DIAGNOSIS — Z00.00 ROUTINE GENERAL MEDICAL EXAMINATION AT A HEALTH CARE FACILITY: Primary | ICD-10-CM

## 2019-12-20 LAB
ALBUMIN SERPL-MCNC: 3.9 G/DL (ref 3.4–5)
ALP SERPL-CCNC: 70 U/L (ref 40–150)
ALT SERPL W P-5'-P-CCNC: 44 U/L (ref 0–70)
ANION GAP SERPL CALCULATED.3IONS-SCNC: 1 MMOL/L (ref 3–14)
AST SERPL W P-5'-P-CCNC: 22 U/L (ref 0–45)
BILIRUB SERPL-MCNC: 0.4 MG/DL (ref 0.2–1.3)
BUN SERPL-MCNC: 18 MG/DL (ref 7–30)
CALCIUM SERPL-MCNC: 8.8 MG/DL (ref 8.5–10.1)
CHLORIDE SERPL-SCNC: 105 MMOL/L (ref 94–109)
CHOLEST SERPL-MCNC: 207 MG/DL
CO2 SERPL-SCNC: 32 MMOL/L (ref 20–32)
CREAT SERPL-MCNC: 1.16 MG/DL (ref 0.66–1.25)
ERYTHROCYTE [DISTWIDTH] IN BLOOD BY AUTOMATED COUNT: 12.2 % (ref 10–15)
GFR SERPL CREATININE-BSD FRML MDRD: 77 ML/MIN/{1.73_M2}
GLUCOSE SERPL-MCNC: 104 MG/DL (ref 70–99)
HBA1C MFR BLD: 5.2 % (ref 0–5.6)
HCT VFR BLD AUTO: 46 % (ref 40–53)
HDLC SERPL-MCNC: 42 MG/DL
HGB BLD-MCNC: 16.1 G/DL (ref 13.3–17.7)
LDLC SERPL CALC-MCNC: 148 MG/DL
MCH RBC QN AUTO: 30.4 PG (ref 26.5–33)
MCHC RBC AUTO-ENTMCNC: 35 G/DL (ref 31.5–36.5)
MCV RBC AUTO: 87 FL (ref 78–100)
NONHDLC SERPL-MCNC: 165 MG/DL
PLATELET # BLD AUTO: 255 10E9/L (ref 150–450)
POTASSIUM SERPL-SCNC: 4.2 MMOL/L (ref 3.4–5.3)
PROT SERPL-MCNC: 6.9 G/DL (ref 6.8–8.8)
RBC # BLD AUTO: 5.3 10E12/L (ref 4.4–5.9)
SODIUM SERPL-SCNC: 138 MMOL/L (ref 133–144)
TRIGL SERPL-MCNC: 85 MG/DL
WBC # BLD AUTO: 6.4 10E9/L (ref 4–11)

## 2019-12-20 PROCEDURE — 85027 COMPLETE CBC AUTOMATED: CPT | Performed by: FAMILY MEDICINE

## 2019-12-20 PROCEDURE — 99396 PREV VISIT EST AGE 40-64: CPT | Mod: 25 | Performed by: FAMILY MEDICINE

## 2019-12-20 PROCEDURE — 36415 COLL VENOUS BLD VENIPUNCTURE: CPT | Performed by: FAMILY MEDICINE

## 2019-12-20 PROCEDURE — 90471 IMMUNIZATION ADMIN: CPT | Performed by: FAMILY MEDICINE

## 2019-12-20 PROCEDURE — 80061 LIPID PANEL: CPT | Performed by: FAMILY MEDICINE

## 2019-12-20 PROCEDURE — 80053 COMPREHEN METABOLIC PANEL: CPT | Performed by: FAMILY MEDICINE

## 2019-12-20 PROCEDURE — 83036 HEMOGLOBIN GLYCOSYLATED A1C: CPT | Performed by: FAMILY MEDICINE

## 2019-12-20 PROCEDURE — 90686 IIV4 VACC NO PRSV 0.5 ML IM: CPT | Performed by: FAMILY MEDICINE

## 2019-12-20 RX ORDER — BUPROPION HYDROCHLORIDE 150 MG/1
150 TABLET, FILM COATED, EXTENDED RELEASE ORAL 2 TIMES DAILY
Qty: 60 TABLET | Refills: 0 | Status: SHIPPED | OUTPATIENT
Start: 2019-12-20 | End: 2020-01-14

## 2019-12-20 ASSESSMENT — ENCOUNTER SYMPTOMS
CHILLS: 0
ABDOMINAL PAIN: 1
JOINT SWELLING: 0
MYALGIAS: 0
EYE PAIN: 0
FEVER: 0
SHORTNESS OF BREATH: 0
NAUSEA: 0
PALPITATIONS: 0
ARTHRALGIAS: 0
HEMATURIA: 0
DIARRHEA: 0
HEARTBURN: 0
CONSTIPATION: 0
DIZZINESS: 0
DYSURIA: 0
FREQUENCY: 0
HEADACHES: 0
NERVOUS/ANXIOUS: 0
SORE THROAT: 0
WEAKNESS: 0
PARESTHESIAS: 0
COUGH: 0
HEMATOCHEZIA: 0

## 2019-12-20 ASSESSMENT — MIFFLIN-ST. JEOR: SCORE: 2083.77

## 2019-12-20 NOTE — PROGRESS NOTES
SUBJECTIVE:   CC: Paul Mckeon is an 44 year old male who presents for preventative health visit.     Healthy Habits:     Getting at least 3 servings of Calcium per day:  NO    Bi-annual eye exam:  Yes    Dental care twice a year:  NO    Sleep apnea or symptoms of sleep apnea:  None    Diet:  Carbohydrate counting and Gluten-free/reduced    Frequency of exercise:  1 day/week    Duration of exercise:  45-60 minutes    Taking medications regularly:  Yes    Medication side effects:  None    PHQ-2 Total Score: 0    Additional concerns today:  Yes      Requesting Medication Wellbutrin to help stop chewing.  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  Carefully tracks his caloric intake, decrease carbs.  Approximately 1800 trell/day.  Continues to have unintentional weight gain due to new life changes.  Interested in some sort of weight management option.    Today's PHQ-2 Score:   PHQ-2 ( 1999 Pfizer) 12/20/2019   Q1: Little interest or pleasure in doing things 0   Q2: Feeling down, depressed or hopeless 0   PHQ-2 Score 0   Q1: Little interest or pleasure in doing things Not at all   Q2: Feeling down, depressed or hopeless Not at all   PHQ-2 Score 0       Abuse: Current or Past(Physical, Sexual or Emotional)- No  Do you feel safe in your environment? Yes        Social History     Tobacco Use     Smoking status: Former Smoker     Types: Cigarettes     Smokeless tobacco: Former User     Types: Chew     Tobacco comment: quit smoking in early 2000's and quit chew in 2013   Substance Use Topics     Alcohol use: Yes     Alcohol/week: 2.0 standard drinks     Types: 2 Standard drinks or equivalent per week       Alcohol Use 12/20/2019   Prescreen: >3 drinks/day or >7 drinks/week? No   Prescreen: >3 drinks/day or >7 drinks/week? -       Reviewed orders with patient. Reviewed health maintenance and updated orders accordingly - Yes  Lab work is in process  Labs reviewed in  "EPIC    Reviewed and updated as needed this visit by clinical staff  Tobacco  Allergies  Meds         Reviewed and updated as needed this visit by Provider        No past medical history on file.     Review of Systems   Constitutional: Negative for chills and fever.   HENT: Negative for congestion, ear pain, hearing loss and sore throat.    Eyes: Negative for pain and visual disturbance.   Respiratory: Negative for cough and shortness of breath.    Cardiovascular: Negative for chest pain, palpitations and peripheral edema.   Gastrointestinal: Positive for abdominal pain. Negative for constipation, diarrhea, heartburn, hematochezia and nausea.   Genitourinary: Negative for discharge, dysuria, frequency, genital sores, hematuria, impotence and urgency.   Musculoskeletal: Negative for arthralgias, joint swelling and myalgias.   Skin: Negative for rash.   Neurological: Negative for dizziness, weakness, headaches and paresthesias.   Psychiatric/Behavioral: Negative for mood changes. The patient is not nervous/anxious.        /70 (BP Location: Right arm, Patient Position: Chair, Cuff Size: Adult Large)   Pulse 83   Temp 99.1  F (37.3  C) (Oral)   Resp 16   Ht 1.88 m (6' 2\")   Wt 111.9 kg (246 lb 11.2 oz)   SpO2 94%   BMI 31.67 kg/m      Physical Exam  Vitals signs reviewed.   Constitutional:       General: He is not in acute distress.     Appearance: Normal appearance. He is not ill-appearing.   HENT:      Head: Normocephalic and atraumatic.      Right Ear: Tympanic membrane and external ear normal.      Left Ear: Tympanic membrane and external ear normal.      Nose: Nose normal.      Mouth/Throat:      Mouth: Mucous membranes are moist.      Pharynx: Oropharynx is clear.   Eyes:      General: No scleral icterus.        Right eye: No discharge.         Left eye: No discharge.      Extraocular Movements: Extraocular movements intact.      Pupils: Pupils are equal, round, and reactive to light.   Neck:      " Musculoskeletal: Normal range of motion.      Vascular: No JVD.   Cardiovascular:      Rate and Rhythm: Normal rate and regular rhythm.      Heart sounds: No murmur.   Pulmonary:      Effort: Pulmonary effort is normal. No respiratory distress.      Breath sounds: Normal breath sounds.   Abdominal:      General: Abdomen is flat. Bowel sounds are normal.      Palpations: Abdomen is soft.      Tenderness: There is abdominal tenderness (LLQ, no guarding).   Musculoskeletal:         General: No swelling.   Lymphadenopathy:      Cervical: No cervical adenopathy.   Skin:     General: Skin is warm.      Capillary Refill: Capillary refill takes less than 2 seconds.   Neurological:      General: No focal deficit present.      Mental Status: He is alert.   Psychiatric:         Mood and Affect: Mood normal.         Behavior: Behavior normal.         Thought Content: Thought content normal.         Judgment: Judgment normal.       Diagnostic Test Results:  Labs reviewed in Epic    ASSESSMENT/PLAN:   1. Routine general medical examination at a health care facility  Work forms filled.  Has some unintentional weight gain, new life stressors.  Will make follow-up with PCP, consider phentermine +/- topamax.  Caution with blood pressure elevation as he is ready and concomitant Zyban for tobacco use management.  - Hemoglobin A1c  - Comprehensive metabolic panel (BMP + Alb, Alk Phos, ALT, AST, Total. Bili, TP)  - CBC with platelets  - Lipid panel reflex to direct LDL Fasting    2. Nicotine use disorder  Informed to start 1 tab daily for the first 3 to 4 days to assess her tolerance and then can start Zyban twice daily.  Follow-up in 1 month for check,  - buPROPion (ZYBAN) 150 MG 12 hr tablet; Take 1 tablet (150 mg) by mouth 2 times daily  Dispense: 60 tablet; Refill: 0      COUNSELING:   Reviewed preventive health counseling, as reflected in patient instructions       Regular exercise       Healthy diet/nutrition        "Immunizations    Vaccinated for: Influenza             HIV screeninx in teen years, 1x in adult years, and at intervals if high risk    Estimated body mass index is 31.67 kg/m  as calculated from the following:    Height as of this encounter: 1.88 m (6' 2\").    Weight as of this encounter: 111.9 kg (246 lb 11.2 oz).     Weight management plan: Patient was referred to their PCP to discuss a diet and exercise plan.  And considerations for pharmacotherapy.     reports that he has quit smoking. His smoking use included cigarettes. He has quit using smokeless tobacco.  His smokeless tobacco use included chew.  Tobacco Cessation Action Plan: Pharmacotherapies : Zyban/Wellbutrin and other Nicotine replacement    Counseling Resources:  ATP IV Guidelines  Pooled Cohorts Equation Calculator  FRAX Risk Assessment  ICSI Preventive Guidelines  Dietary Guidelines for Americans, 2010  USDA's MyPlate  ASA Prophylaxis  Lung CA Screening    Quentin Looney MD  [facility]    Documentation was prepared using Dragon voice recognition software. Please excuse typographical errors. Please contact me if documentation is unclear.    "

## 2020-01-12 DIAGNOSIS — F17.200 NICOTINE USE DISORDER: ICD-10-CM

## 2020-01-13 NOTE — TELEPHONE ENCOUNTER
Hospitalist Progress Note-critical care note     Patient: Delta Horvath MRN: [id number]  CSN: [id number]    YOB: 1943  Age: 68 y.o. Sex: female    DOA: 1/30/2017 LOS:  LOS: 4 days            Chief complaint: chf exacerbation,  Acute on chronic respiratory failure , chest pain,  copd exacerbation ,  HTN    Assessment/Plan         Patient Active Problem List   Diagnosis Code    Acute on chronic diastolic CHF (congestive heart failure) ([facility]) I50.33    ALEJANDRINA (acute kidney injury) ([facility] N17.9    Acute exacerbation of CHF (congestive heart failure) ([facility]) I50.9    DM (diabetes mellitus) ([facility] E11.9    Hypertension I10    Respiratory failure ([facility] J96.90    Acute coronary syndrome ([facility] I24.9    Obstructive sleep apnea, adult G47.33    Paroxysmal atrial fibrillation ([facility]) I48.0    Poorly controlled type II diabetes mellitus with renal complication ([facility]) E08.90, E11.65    Dyspnea due to congestive heart failure ([facility]) I50.9    Aspiration pneumonia ([facility]) J69.0    Hyperkalemia E87.5    Elevated troponin R79.89    Acute respiratory distress ([facility]) R06.00    COPD (chronic obstructive pulmonary disease) ([facility]) J44.9    CHF (congestive heart failure) ([facility]) I50.9    Insufficiency, respiratory, acute ([facility]) R06.89    Infiltrate noted on imaging study R93.8    Chronic respiratory failure with hypoxia ([facility]) J96.11    COPD exacerbation ([facility]) J44.1    Acute on chronic respiratory failure with hypoxia ([facility]) J96.21    Chest pain R07.9     1. Acute on chronic  respiratory failure   Improving, on nc O2 , continue weaning off and need bipap at night   - hx of intubation in 2016. Appreciated intensive input. Multiple factors, copd exacerbation, possible hcap, hermelinda, Pulmonary hypertension (moderated from 2016 echo and chf exacerbation   2. Copd exacerbation and possible hcap  Will continue breathing tx with xopenex.  pulmocort and brovana, on  levaquin  3 afib with rvr  Rate controlled per cardizem now, mychart sent to verify if pt is taking BID as he was to start at once a day.  He is also due for a med check  Michael Rios RN, BSN     optimize the electrolytes, on warfarin , INR 2.2   4. chf exacerbation -diastolic from previous echo  continue lasix ,ef wnl. Dr. Zamora on board , echo reviewed, EF reserved   5. Hyperkalemia  Resolved,   6. chest pain am  Case discussed with Dr. Zamora, resolved per sub L nitro ,repeated EKG no change, ce 0.35 ,lower than before   7. DM type II ,   -long term insulin ,   - will increase lantus to 25 ,  ssi to three times before meal, hold bedtime  8 HTN, accelerated  Continue home medication. 9. Pulmonary hypertension   moderate from echo   10 hermelinda   Not compliance cpap at home   11 anxiety  Continue prn ativan   12. Insomnia   Continue trazodone prn for sleep     Subjective: sob better, chest pain now   Nurse: no acute issue       Review of systems:    General: No fevers or chills. Cardiovascular:  chest pain , . No palpitations. Pulmonary: shortness of breath better,   Gastrointestinal: No nausea, vomiting. Vital signs/Intake and Output:  Visit Vitals    BP (!) 157/93    Pulse (!) 112    Temp 98 °F (36.7 °C)    Resp (!) 31    Ht 5' 5\" (1.651 m)    Wt 111.4 kg (245 lb 9.5 oz)    SpO2 96%    BMI 40.87 kg/m2     Current Shift:  02/03 0701 - 02/03 1900  In: 120 [P.O.:120]  Out: -   Last three shifts:  02/01 1901 - 02/03 0700  In: 730 [P.O.:580; I.V.:150]  Out: 3650 [Urine:3650]    Physical Exam:  General: WD, WN. Alert, cooperative, no acute distress    HEENT: NC, Atraumatic. PERRLA, anicteric sclerae. bipap mask noted   Lungs: CTA Bilaterally. No Wheezing/Rhonchi/Rales. Heart:  Irregular irregular ,  No murmur, No Rubs, No Gallops  Abdomen: Soft, Non distended, Non tender.  +Bowel sounds,   Extremities: No c/c/e  Psych:   anxious . No agitated. Neurologic:  No acute neurological deficit.              Labs: Results:       Chemistry Recent Labs      02/03/17   1027  02/03/17   0415  02/02/17   2210   02/02/17   0415   02/01/17   0415   GLU   --   197*   --    --   56*   --   168*   NA   --   140   -- --   142   --   137   K  3.9  4.2  4.4   < >  3.9   < >  5.2   CL   --   96*   --    --   99*   --   98*   CO2   --   38*   --    --   38*   --   35*   BUN   --   46*   --    --   49*   --   45*   CREA   --   1.36*   --    --   1.36*   --   1.47*   CA   --   8.5   --    --   8.8   --   9.3   AGAP   --   6   --    --   5   --   4   BUCR   --   34*   --    --   36*   --   31*   AP   --   114   --    --   115   --   125*   TP   --   6.6   --    --   6.4   --   6.7   ALB   --   3.2*   --    --   3.1*   --   3.2*   GLOB   --   3.4   --    --   3.3   --   3.5   AGRAT   --   0.9   --    --   0.9   --   0.9    < > = values in this interval not displayed. CBC w/Diff Recent Labs      02/03/17 0415 02/02/17 0415 02/01/17 0415   WBC  9.9  12.5  15.4*   RBC  3.58*  3.54*  3.46*   HGB  11.4*  11.3*  11.1*   HCT  36.3  35.8  35.3   PLT  191  218  218   GRANS  93*  91*  92*   LYMPH  4*  6*  3*   EOS  0  0  0      Cardiac Enzymes Recent Labs      02/03/17 0415 02/01/17 0420   CPK  23*  24*   CKND1  4.3*  3.8      Coagulation Recent Labs      02/03/17 0415 02/02/17 0415   PTP  20.2*  23.6*   INR  1.8*  2.2*       Lipid Panel No results found for: CHOL, CHOLPOCT, CHOLX, CHLST, CHOLV, [id number], HDL, LDL, NLDLCT, DLDL, LDLC, DLDLP, [id number], VLDLC, VLDL, TGL, TGLX, TRIGL, [id number], TRIGP, TGLPOCT, [id number], CHHD, CHHDX   BNP No results for input(s): BNPP in the last 72 hours.    Liver Enzymes Recent Labs      02/03/17   0415   TP  6.6   ALB  3.2*   AP  114   SGOT  14*      Thyroid Studies Lab Results   Component Value Date/Time    TSH 0.31 01/01/2016 10:20 PM        Procedures/imaging: see electronic medical records for all procedures/Xrays and details which were not copied into this note but were reviewed prior to creation of Saranya Egan MD

## 2020-01-14 RX ORDER — BUPROPION HYDROCHLORIDE 150 MG/1
150 TABLET, FILM COATED, EXTENDED RELEASE ORAL 2 TIMES DAILY
Qty: 60 TABLET | Refills: 0 | Status: SHIPPED | OUTPATIENT
Start: 2020-01-14 | End: 2020-02-12

## 2020-01-14 NOTE — TELEPHONE ENCOUNTER
Prescription approved per [facility] Refill Protocol.  Michael Rios RN, BSN

## 2020-02-11 DIAGNOSIS — F17.200 NICOTINE USE DISORDER: ICD-10-CM

## 2020-02-11 NOTE — TELEPHONE ENCOUNTER
Routing refill request to provider for review/approval because:  Drug not on the [facility] refill protocol   Michael Rios RN, BSN

## 2020-02-12 RX ORDER — BUPROPION HYDROCHLORIDE 150 MG/1
TABLET, FILM COATED, EXTENDED RELEASE ORAL
Qty: 60 TABLET | Refills: 2 | Status: SHIPPED | OUTPATIENT
Start: 2020-02-12 | End: 2020-05-26

## 2020-02-19 ENCOUNTER — OFFICE VISIT (OUTPATIENT)
Dept: FAMILY MEDICINE | Facility: CLINIC | Age: 45
End: 2020-02-19
Payer: COMMERCIAL

## 2020-02-19 VITALS
WEIGHT: 247.6 LBS | DIASTOLIC BLOOD PRESSURE: 84 MMHG | BODY MASS INDEX: 30.15 KG/M2 | HEIGHT: 76 IN | RESPIRATION RATE: 15 BRPM | OXYGEN SATURATION: 97 % | SYSTOLIC BLOOD PRESSURE: 122 MMHG | TEMPERATURE: 97.8 F | HEART RATE: 72 BPM

## 2020-02-19 DIAGNOSIS — D22.9 CHANGING NEVUS: ICD-10-CM

## 2020-02-19 DIAGNOSIS — F17.200 TOBACCO USE DISORDER: Primary | ICD-10-CM

## 2020-02-19 PROCEDURE — 99213 OFFICE O/P EST LOW 20 MIN: CPT | Performed by: FAMILY MEDICINE

## 2020-02-19 ASSESSMENT — MIFFLIN-ST. JEOR: SCORE: 2111.67

## 2020-02-19 NOTE — PROGRESS NOTES
"Subjective     Paul Mckeon is a 44 year old male who presents to clinic today for the following health issues:    HPI   Medication Followup of  bupropion    Taking Medication as prescribed: yes    Side Effects:  None    Medication Helping Symptoms:  yes     Taking bupropion to help with tobacco cessation with chewing tobacco.  Feels that medication may be helping with mood as well and reducing stress reducing his need to use tobacco.    Growing skin spot mid back    Reviewed and updated as needed this visit by Provider  Tobacco  Allergies  Meds  Problems  Med Hx  Surg Hx  Fam Hx         Review of Systems   ROS COMP: INTEGUMENTARY/SKIN: as above  PSYCHIATRIC: as above      Objective    /84 (BP Location: Right arm, Patient Position: Chair, Cuff Size: Adult Large)   Pulse 72   Temp 97.8  F (36.6  C) (Oral)   Resp 15   Ht 1.918 m (6' 3.5\")   Wt 112.3 kg (247 lb 9.6 oz)   SpO2 97%   BMI 30.54 kg/m    Body mass index is 30.54 kg/m .  Physical Exam   GENERAL: healthy, alert and no distress  SKIN: Mid back skin colored raised lesion  PSYCH: mentation appears normal, affect normal/bright        Assessment & Plan     1. Tobacco use disorder  Continue current medication as he is doing well from tobacco cessation.  We will extend length of duration of medication as he appears to be having some mood improvements and this is helping him stay tobacco free.    2. Changing nevus  Consider excision with growing nevus.       Return in about 1 year (around 2/19/2021) for routine physical.    Shiraz Gil MD  [facility]      "

## 2020-03-09 DIAGNOSIS — B00.1 RECURRENT COLD SORES: ICD-10-CM

## 2020-03-09 RX ORDER — ACYCLOVIR 400 MG/1
TABLET ORAL
Qty: 180 TABLET | Refills: 4 | Status: SHIPPED | OUTPATIENT
Start: 2020-03-09 | End: 2021-03-23

## 2020-03-09 NOTE — TELEPHONE ENCOUNTER
Prescription approved per [facility] Refill Protocol.  Michael Rios RN, BSN

## 2020-05-23 DIAGNOSIS — F17.200 NICOTINE USE DISORDER: ICD-10-CM

## 2020-05-26 RX ORDER — BUPROPION HYDROCHLORIDE 150 MG/1
TABLET, FILM COATED, EXTENDED RELEASE ORAL
Qty: 60 TABLET | Refills: 2 | Status: SHIPPED | OUTPATIENT
Start: 2020-05-26 | End: 2020-09-04

## 2020-05-26 NOTE — TELEPHONE ENCOUNTER
Routing refill request to provider for review/approval because:  Drug not on the [facility] refill protocol     Javon VALDEZ RN, BSN

## 2020-09-04 DIAGNOSIS — F17.200 NICOTINE USE DISORDER: ICD-10-CM

## 2020-09-04 RX ORDER — BUPROPION HYDROCHLORIDE 150 MG/1
TABLET, FILM COATED, EXTENDED RELEASE ORAL
Qty: 180 TABLET | Refills: 1 | Status: SHIPPED | OUTPATIENT
Start: 2020-09-04 | End: 2021-03-11

## 2020-09-04 NOTE — TELEPHONE ENCOUNTER
Prescription approved per [facility] [place] Protocol.  Michael Rios RN, BSN

## 2020-12-13 ENCOUNTER — HEALTH MAINTENANCE LETTER (OUTPATIENT)
Age: 45
End: 2020-12-13

## 2021-02-01 ENCOUNTER — OFFICE VISIT (OUTPATIENT)
Dept: DERMATOLOGY | Facility: CLINIC | Age: 46
End: 2021-02-01
Payer: COMMERCIAL

## 2021-02-01 VITALS — HEART RATE: 81 BPM | SYSTOLIC BLOOD PRESSURE: 143 MMHG | DIASTOLIC BLOOD PRESSURE: 91 MMHG

## 2021-02-01 DIAGNOSIS — Z80.8 FAMILY HISTORY OF SQUAMOUS CELL CARCINOMA OF SKIN: ICD-10-CM

## 2021-02-01 DIAGNOSIS — D18.01 CHERRY ANGIOMA: ICD-10-CM

## 2021-02-01 DIAGNOSIS — L81.4 LENTIGINES: Primary | ICD-10-CM

## 2021-02-01 DIAGNOSIS — D22.9 MULTIPLE BENIGN NEVI: ICD-10-CM

## 2021-02-01 DIAGNOSIS — D48.5 NEOPLASM OF UNCERTAIN BEHAVIOR OF SKIN: ICD-10-CM

## 2021-02-01 PROCEDURE — 99203 OFFICE O/P NEW LOW 30 MIN: CPT | Mod: 25 | Performed by: PHYSICIAN ASSISTANT

## 2021-02-01 PROCEDURE — 11102 TANGNTL BX SKIN SINGLE LES: CPT | Performed by: PHYSICIAN ASSISTANT

## 2021-02-01 PROCEDURE — 88305 TISSUE EXAM BY PATHOLOGIST: CPT | Performed by: PATHOLOGY

## 2021-02-01 NOTE — LETTER
2/1/2021         RE: Paul Mckeon  [address] [facility] [id number]        Dear Colleague,    Thank you for referring your patient, Paul Mckeon, to the [facility]. Please see a copy of my visit note below.    HPI:  Paul Mckeon is a 45 year old male patient here today for growth on back .  Patient states this has been present for years.  Patient reports the following symptoms: bothersome, irritated .  Patient reports the following previous treatments: none.  Patient reports the following modifying factors: none.  Associated symptoms: none.  Patient has no other skin complaints today.  Remainder of the HPI, Meds, PMH, Allergies, FH, and SH was reviewed in chart.    Pertinent Hx:   No personal history of skin cancer. Mother has a history of SCC.   No past medical history on file.    Past Surgical History:   Procedure Laterality Date     ARTHROSCOPIC RECONSTRUCTION ANTERIOR AND POSTERIOR CRUCIATE LIGAMENT, COMBINED       HAND SURGERY       TONSILLECTOMY          Family History   Problem Relation Age of Onset     Thyroid Disease Mother      Hypertension Mother      Obesity Mother      Respiratory Mother         EB     Skin Cancer Mother      Heart Disease Father         Arrythmia     Respiratory Father         EB     Thyroid Disease Maternal Grandmother        Social History     Socioeconomic History     Marital status:      Spouse name: Not on file     Number of children: Not on file     Years of education: Not on file     Highest education level: Not on file   Occupational History     Employer: [employer]     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   Social Needs     Financial resource strain: Not on file     Food insecurity     Worry: Not on file     Inability: Not on file     Transportation needs     Medical: Not on file     Non-medical: Not on file   Tobacco Use     Smoking status: Former Smoker     Types: Cigarettes     Smokeless  tobacco: Former User     Types: Chew     Tobacco comment: quit smoking in early 2000's and quit chew in 2013   Substance and Sexual Activity     Alcohol use: Yes     Alcohol/week: 2.0 standard drinks     Types: 2 Standard drinks or equivalent per week     Drug use: No     Sexual activity: Yes     Partners: Female   Lifestyle     Physical activity     Days per week: Not on file     Minutes per session: Not on file     Stress: Not on file   Relationships     Social connections     Talks on phone: Not on file     Gets together: Not on file     Attends Congregational service: Not on file     Active member of club or organization: Not on file     Attends meetings of clubs or organizations: Not on file     Relationship status: Not on file     Intimate partner violence     Fear of current or ex partner: Not on file     Emotionally abused: Not on file     Physically abused: Not on file     Forced sexual activity: Not on file   Other Topics Concern     Parent/sibling w/ CABG, MI or angioplasty before 65F 55M? No   Social History Narrative    , 3 kids       Outpatient Encounter Medications as of 2/1/2021   Medication Sig Dispense Refill     acyclovir (ZOVIRAX) 400 MG tablet TAKE 1 TABLET BY MOUTH 2 TIMES DAILY 180 tablet 4     buPROPion (ZYBAN) 150 MG 12 hr tablet TAKE 1 TABLET BY MOUTH TWICE A  tablet 1     Multiple Vitamins-Minerals (CENTRUM MEN PO)        No facility-administered encounter medications on file as of 2/1/2021.        Review Of Systems:  Skin: growth  Eyes: negative  Ears/Nose/Throat: negative  Respiratory: No shortness of breath, dyspnea on exertion, cough, or hemoptysis  Cardiovascular: negative  Gastrointestinal: negative  Genitourinary: negative  Musculoskeletal: negative  Neurologic: negative  Psychiatric: negative  Hematologic/Lymphatic/Immunologic: negative  Endocrine: negative      Objective:     BP (!) 143/82   Pulse 81   Eyes: Conjunctivae/lids: Normal   ENT: Lips:  Normal  MSK:  Normal  Cardiovascular: Peripheral edema none  Pulm: Breathing Normal  Neuro/Psych: Orientation: A/O x 3. Normal; Mood/Affect: Normal, NAD, WDWN  Pt accompanied by: self  Following areas examined: face ( wearing mask), scalp, back  Tong skin type:ii   Findings:  Red smooth well-defined macules on back  Well circumscribed macules with symmetric color distribution on back  Glass WD smooth macules on back  Smooth colored smooth flesh/papule on right medial mid back 0.5cm    Assessment and Plan:     1) Benign nevi, Lentigines, cherry angioma     I discussed the specifics of tumor, prognosis, and genetics of benign lesions.  I explained that treatment of these lesions would be purely cosmetic and not medically neccessary.  I discussed with patient different removal options including excision, cryotherapy, cautery and /or laser.  Lesion may recur and/or may not completely resolve. May need additional treatment.     2) Neoplasm of uncertain behavior right medial mid back 0.5cm  Rule out nevus vs tag vs neurofibroma  TANGENTIAL BIOPSY:  After consent, anesthesia with LEC and prep, tangential biopsy performed.  No complications and routine wound care.  May grow back and will get a scar. Based on lesion type may need to completely remove lesion. Patient will be notified in 7-10 days of results. Wound care directions given.    3) family history of SCC  Signs and Symptoms of non-melanoma skin cancer and ABCDEs of melanoma reviewed with patient. Patient encouraged to perform monthly self skin exams and educated on how to perform them. UV precautions reviewed with patient. Patient was asked about new or changing moles/lesions on body.   Wear a sunscreen with at least SPF 30 on your face, ears, neck and V of the chest daily. Wear sunscreen on other areas of the body if those areas are exposed to the sun throughout the day. Sunscreens can contain physical and/or chemical blockers. Physical blockers are less likely to clog  pores, these include zinc oxide and titanium dioxide. Reapply every two hour and after swimming. Sunscreen examples include Neutrogena, CeraVe, Blue Lizard, Elta MD and many others.    Proper skin care from [facility]:    -Eliminate harsh soaps as they strip the natural oils from the skin, often resulting in dry itchy skin ( i.e. Dial, Zest, [place] Spring)  -Use mild soaps such as Cetaphil or Dove Sensitive Skin in the shower. You do not need to use soap on arms, legs, and trunk every time you shower unless visibly soiled.   -Avoid hot or cold showers.  -After showering, lightly dry off and apply moisturizing within 2-3 minutes. This will help trap moisture in the skin.   -Aggressive use of a moisturizer at least 1-2 times a day to the entire body (including -Vanicream, Cetaphil, Aquaphor or Cerave) and moisturize hands after every washing.  -We recommend using moisturizers that come in a tub that needs to be scooped out, not a pump. This has more of an oil base. It will hold moisture in your skin much better than a water base moisturizer. The above recommended are non-pore clogging.       Wilian to follow up with Primary Care provider regarding elevated blood pressure.          Follow up in yearly FBE        Again, thank you for allowing me to participate in the care of your patient.        Sincerely,        Shantell Vera PA-C

## 2021-02-01 NOTE — PROGRESS NOTES
HPI:  Paul Mckeon is a 45 year old male patient here today for growth on back .  Patient states this has been present for years.  Patient reports the following symptoms: bothersome, irritated .  Patient reports the following previous treatments: none.  Patient reports the following modifying factors: none.  Associated symptoms: none.  Patient has no other skin complaints today.  Remainder of the HPI, Meds, PMH, Allergies, FH, and SH was reviewed in chart.    Pertinent Hx:   No personal history of skin cancer. Mother has a history of SCC.   No past medical history on file.    Past Surgical History:   Procedure Laterality Date     ARTHROSCOPIC RECONSTRUCTION ANTERIOR AND POSTERIOR CRUCIATE LIGAMENT, COMBINED       HAND SURGERY       TONSILLECTOMY          Family History   Problem Relation Age of Onset     Thyroid Disease Mother      Hypertension Mother      Obesity Mother      Respiratory Mother         EB     Skin Cancer Mother      Heart Disease Father         Arrythmia     Respiratory Father         EB     Thyroid Disease Maternal Grandmother        Social History     Socioeconomic History     Marital status:      Spouse name: Not on file     Number of children: Not on file     Years of education: Not on file     Highest education level: Not on file   Occupational History     Employer: [employer]     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   Social Needs     Financial resource strain: Not on file     Food insecurity     Worry: Not on file     Inability: Not on file     Transportation needs     Medical: Not on file     Non-medical: Not on file   Tobacco Use     Smoking status: Former Smoker     Types: Cigarettes     Smokeless tobacco: Former User     Types: Chew     Tobacco comment: quit smoking in early 2000's and quit chew in 2013   Substance and Sexual Activity     Alcohol use: Yes     Alcohol/week: 2.0 standard drinks     Types: 2 Standard drinks or equivalent per week     Drug use: No      Sexual activity: Yes     Partners: Female   Lifestyle     Physical activity     Days per week: Not on file     Minutes per session: Not on file     Stress: Not on file   Relationships     Social connections     Talks on phone: Not on file     Gets together: Not on file     Attends Restorationism service: Not on file     Active member of club or organization: Not on file     Attends meetings of clubs or organizations: Not on file     Relationship status: Not on file     Intimate partner violence     Fear of current or ex partner: Not on file     Emotionally abused: Not on file     Physically abused: Not on file     Forced sexual activity: Not on file   Other Topics Concern     Parent/sibling w/ CABG, MI or angioplasty before 65F 55M? No   Social History Narrative    , 3 kids       Outpatient Encounter Medications as of 2/1/2021   Medication Sig Dispense Refill     acyclovir (ZOVIRAX) 400 MG tablet TAKE 1 TABLET BY MOUTH 2 TIMES DAILY 180 tablet 4     buPROPion (ZYBAN) 150 MG 12 hr tablet TAKE 1 TABLET BY MOUTH TWICE A  tablet 1     Multiple Vitamins-Minerals (CENTRUM MEN PO)        No facility-administered encounter medications on file as of 2/1/2021.        Review Of Systems:  Skin: growth  Eyes: negative  Ears/Nose/Throat: negative  Respiratory: No shortness of breath, dyspnea on exertion, cough, or hemoptysis  Cardiovascular: negative  Gastrointestinal: negative  Genitourinary: negative  Musculoskeletal: negative  Neurologic: negative  Psychiatric: negative  Hematologic/Lymphatic/Immunologic: negative  Endocrine: negative      Objective:     BP (!) 143/82   Pulse 81   Eyes: Conjunctivae/lids: Normal   ENT: Lips:  Normal  MSK: Normal  Cardiovascular: Peripheral edema none  Pulm: Breathing Normal  Neuro/Psych: Orientation: A/O x 3. Normal; Mood/Affect: Normal, NAD, WDWN  Pt accompanied by: self  Following areas examined: face ( wearing mask), scalp, back  Tong skin type:ii   Findings:  Red smooth  well-defined macules on back  Well circumscribed macules with symmetric color distribution on back  Glass WD smooth macules on back  Smooth colored smooth flesh/papule on right medial mid back 0.5cm    Assessment and Plan:     1) Benign nevi, Lentigines, cherry angioma     I discussed the specifics of tumor, prognosis, and genetics of benign lesions.  I explained that treatment of these lesions would be purely cosmetic and not medically neccessary.  I discussed with patient different removal options including excision, cryotherapy, cautery and /or laser.  Lesion may recur and/or may not completely resolve. May need additional treatment.     2) Neoplasm of uncertain behavior right medial mid back 0.5cm  Rule out nevus vs tag vs neurofibroma  TANGENTIAL BIOPSY:  After consent, anesthesia with LEC and prep, tangential biopsy performed.  No complications and routine wound care.  May grow back and will get a scar. Based on lesion type may need to completely remove lesion. Patient will be notified in 7-10 days of results. Wound care directions given.    3) family history of SCC  Signs and Symptoms of non-melanoma skin cancer and ABCDEs of melanoma reviewed with patient. Patient encouraged to perform monthly self skin exams and educated on how to perform them. UV precautions reviewed with patient. Patient was asked about new or changing moles/lesions on body.   Wear a sunscreen with at least SPF 30 on your face, ears, neck and V of the chest daily. Wear sunscreen on other areas of the body if those areas are exposed to the sun throughout the day. Sunscreens can contain physical and/or chemical blockers. Physical blockers are less likely to clog pores, these include zinc oxide and titanium dioxide. Reapply every two hour and after swimming. Sunscreen examples include Neutrogena, CeraVe, Blue Lizard, Elta MD and many others.    Proper skin care from [facility]:    -Eliminate harsh soaps as they strip the natural oils  from the skin, often resulting in dry itchy skin ( i.e. Dial, Zest, [place])  -Use mild soaps such as Cetaphil or Dove Sensitive Skin in the shower. You do not need to use soap on arms, legs, and trunk every time you shower unless visibly soiled.   -Avoid hot or cold showers.  -After showering, lightly dry off and apply moisturizing within 2-3 minutes. This will help trap moisture in the skin.   -Aggressive use of a moisturizer at least 1-2 times a day to the entire body (including -Vanicream, Cetaphil, Aquaphor or Cerave) and moisturize hands after every washing.  -We recommend using moisturizers that come in a tub that needs to be scooped out, not a pump. This has more of an oil base. It will hold moisture in your skin much better than a water base moisturizer. The above recommended are non-pore clogging.       Wilian to follow up with Primary Care provider regarding elevated blood pressure.          Follow up in yearly FBE

## 2021-02-01 NOTE — PATIENT INSTRUCTIONS
Wound Care Instructions   Back  FOR SUPERFICIAL WOUNDS     [facility] [phone]    [facility] [phone]          AFTER 24 HOURS YOU SHOULD REMOVE THE BANDAGE AND BEGIN DAILY DRESSING CHANGES AS FOLLOWS:     1) Remove Dressing.     2) Clean and dry the area with tap water using a Q-tip or sterile gauze pad.     3) Apply Vaseline, Aquaphor, Polysporin ointment or Bacitracin ointment over entire wound.  Do NOT use Neosporin ointment.     4) Cover the wound with a band-aid, or a sterile non-stick gauze pad and micropore paper tape      REPEAT THESE INSTRUCTIONS AT LEAST ONCE A DAY UNTIL THE WOUND HAS COMPLETELY HEALED.    It is an old wives tale that a wound heals better when it is exposed to air and allowed to dry out. The wound will heal faster with a better cosmetic result if it is kept moist with ointment and covered with a bandage.    **Do not let the wound dry out.**      Supplies Needed:      *Cotton tipped applicators (Q-tips)    *Polysporin Ointment or Bacitracin Ointment (NOT NEOSPORIN)    *Band-aids or non-stick gauze pads and micropore paper tape.      PATIENT INFORMATION:    During the healing process you will notice a number of changes. All wounds develop a small halo of redness surrounding the wound.  This means healing is occurring. Severe itching with extensive redness usually indicates sensitivity to the ointment or bandage tape used to dress the wound.  You should call our office if this develops.      Swelling  and/or discoloration around your surgical site is common, particularly when performed around the eye.    All wounds normally drain.  The larger the wound the more drainage there will be.  After 7-10 days, you will notice the wound beginning to shrink and new skin will begin to grow.  The wound is healed when you can see skin has formed over the entire area.  A healed wound has a healthy, shiny look to the surface and is red to dark pink in color  to normalize.  Wounds may take approximately 4-6 weeks to heal.  Larger wounds may take 6-8 weeks.  After the wound is healed you may discontinue dressing changes.    You may experience a sensation of tightness as your wound heals. This is normal and will gradually subside.    Your healed wound may be sensitive to temperature changes. This sensitivity improves with time, but if you re having a lot of discomfort, try to avoid temperature extremes.    Patients frequently experience itching after their wound appears to have healed because of the continue healing under the skin.  Plain Vaseline will help relieve the itching.        POSSIBLE COMPLICATIONS    BLEEDIN. Leave the bandage in place.  2. Use tightly rolled up gauze or a cloth to apply direct pressure over the bandage for 30  minutes.  3. Reapply pressure for an additional 30 minutes if necessary  4. Use additional gauze and tape to maintain pressure once the bleeding has stopped.        Proper skin care from [facility]:    -Eliminate harsh soaps as they strip the natural oils from the skin, often resulting in dry itchy skin ( i.e. Dial, Zest, Nelia Spring)  -Use mild soaps such as Cetaphil or Dove Sensitive Skin in the shower. You do not need to use soap on arms, legs, and trunk every time you shower unless visibly soiled.   -Avoid hot or cold showers.  -After showering, lightly dry off and apply moisturizing within 2-3 minutes. This will help trap moisture in the skin.   -Aggressive use of a moisturizer at least 1-2 times a day to the entire body (including -Vanicream, Cetaphil, Aquaphor or Cerave) and moisturize hands after every washing.  -We recommend using moisturizers that come in a tub that needs to be scooped out, not a pump. This has more of an oil base. It will hold moisture in your skin much better than a water base moisturizer. The above recommended are non-pore clogging.      Wear a sunscreen with at least SPF 30 on your face,  ears, neck and V of the chest daily. Wear sunscreen on other areas of the body if those areas are exposed to the sun throughout the day. Sunscreens can contain physical and/or chemical blockers. Physical blockers are less likely to clog pores, these include zinc oxide and titanium dioxide. Reapply every two hour and after swimming. Sunscreen examples include Neutrogena, CeraVe, Blue Lizard, Elta MD and many others.    UV radiation  UVA radiation remains constant throughout the day and throughout the year. It is a longer wavelength than UVB and therefore penetrates deeper into the skin leading to immediate and delayed tanning, photoaging, and skin cancer. 70-80% of UVA and UVB radiation occurs between the hours of 10am-2pm.  UVB radiation  UVB radiation causes the most harmful effects and is more significant during the summer months. However, snow and ice can reflect UVB radiation leading to skin damage during the winter months as well. UVB radiation is responsible for tanning, burning, inflammation, delayed erythema (pinkness), pigmentation (brown spots), and skin cancer.     I recommend self monthly full body exams and yearly full body exams with a dermatology provider. If you develop a new or changing lesion please follow up for examination. Most skin cancers are pink and scaly or pink and pearly. However, we do see blue/brown/black skin cancers.  Consider the ABCDEs of melanoma when giving yourself your monthly full body exam ( don't forget the groin, buttocks, feet, toes, etc). A-asymmetry, B-borders, C-color, D-diameter, E-elevation or evolving. If you see any of these changes please follow up in clinic. If you cannot see your back I recommend purchasing a hand held mirror to use with a larger wall mirror.

## 2021-02-01 NOTE — NURSING NOTE
Bp rechecked. This was right after a biopsy procedure and pt getting dressed.    No signs or symptoms of dizziness, chest pain, shortness of breath, or blurry vision.     Katie Pink MA

## 2021-02-03 LAB — COPATH REPORT: NORMAL

## 2021-02-21 ENCOUNTER — HEALTH MAINTENANCE LETTER (OUTPATIENT)
Age: 46
End: 2021-02-21

## 2021-03-11 DIAGNOSIS — F17.200 NICOTINE USE DISORDER: ICD-10-CM

## 2021-03-11 RX ORDER — BUPROPION HYDROCHLORIDE 150 MG/1
TABLET, EXTENDED RELEASE ORAL
Qty: 180 TABLET | Refills: 0 | Status: SHIPPED | OUTPATIENT
Start: 2021-03-11 | End: 2021-04-28

## 2021-03-11 NOTE — TELEPHONE ENCOUNTER
Medication is being filled for 1 time refill only due to:  Patient needs to be seen because it has been more than one year since last visit.     Please call patient and assist in scheduling appointment.    Javon VALDEZ RN, BSN

## 2021-03-18 NOTE — TELEPHONE ENCOUNTER
2nd attempt:    Called [facility] patient needs to schedule follow up for ongoing refills     Augusta Witt/

## 2021-04-22 ENCOUNTER — IMMUNIZATION (OUTPATIENT)
Dept: NURSING | Facility: CLINIC | Age: 46
End: 2021-04-22
Payer: COMMERCIAL

## 2021-04-22 PROCEDURE — 0011A PR COVID VAC MODERNA 100 MCG/0.5 ML IM: CPT

## 2021-04-22 PROCEDURE — 91301 PR COVID VAC MODERNA 100 MCG/0.5 ML IM: CPT

## 2021-04-28 ENCOUNTER — OFFICE VISIT (OUTPATIENT)
Dept: FAMILY MEDICINE | Facility: CLINIC | Age: 46
End: 2021-04-28
Payer: COMMERCIAL

## 2021-04-28 VITALS
HEIGHT: 76 IN | SYSTOLIC BLOOD PRESSURE: 142 MMHG | BODY MASS INDEX: 30.69 KG/M2 | OXYGEN SATURATION: 97 % | WEIGHT: 252 LBS | TEMPERATURE: 98.4 F | HEART RATE: 89 BPM | DIASTOLIC BLOOD PRESSURE: 102 MMHG | RESPIRATION RATE: 16 BRPM

## 2021-04-28 DIAGNOSIS — B00.1 RECURRENT COLD SORES: ICD-10-CM

## 2021-04-28 DIAGNOSIS — I10 ESSENTIAL HYPERTENSION: Primary | ICD-10-CM

## 2021-04-28 DIAGNOSIS — F17.200 NICOTINE USE DISORDER: ICD-10-CM

## 2021-04-28 PROCEDURE — 93000 ELECTROCARDIOGRAM COMPLETE: CPT | Performed by: FAMILY MEDICINE

## 2021-04-28 PROCEDURE — 99214 OFFICE O/P EST MOD 30 MIN: CPT | Performed by: FAMILY MEDICINE

## 2021-04-28 RX ORDER — HYDROCHLOROTHIAZIDE 12.5 MG/1
12.5 TABLET ORAL DAILY
Qty: 30 TABLET | Refills: 1 | Status: SHIPPED | OUTPATIENT
Start: 2021-04-28 | End: 2021-07-06

## 2021-04-28 RX ORDER — BUPROPION HYDROCHLORIDE 150 MG/1
TABLET, EXTENDED RELEASE ORAL
Qty: 180 TABLET | Refills: 3 | Status: SHIPPED | OUTPATIENT
Start: 2021-04-28 | End: 2021-09-15

## 2021-04-28 RX ORDER — BUPROPION HYDROCHLORIDE 150 MG/1
TABLET, FILM COATED, EXTENDED RELEASE ORAL
COMMUNITY
Start: 2020-08-04 | End: 2021-04-28

## 2021-04-28 RX ORDER — ACYCLOVIR 400 MG/1
TABLET ORAL
Qty: 180 TABLET | Refills: 3 | Status: SHIPPED | OUTPATIENT
Start: 2021-04-28 | End: 2022-08-25

## 2021-04-28 ASSESSMENT — MIFFLIN-ST. JEOR: SCORE: 2126.62

## 2021-04-28 NOTE — PATIENT INSTRUCTIONS
Patient Education     Lowering Your Blood Pressure with DASH  What is the DASH eating plan?  DASH (Dietary Approaches to Stop Hypertension) can help you prevent or lower high blood pressure. This eating plan provides the nutrients that help lower blood pressure: potassium, magnesium, calcium, protein and fiber.   If not controlled, high blood pressure may lead to heart disease, stroke or blindness.  This eating plan is rich in:     Fruits and vegetables    Whole grains    Fat-free or low-fat milk products    Fish and poultry (chicken, turkey, etc.)    Beans, seeds and nuts.  This eating plan is low in:     Salt and sodium    Sugar, sweets and drinks with sugar    Red meat, saturated fat and trans fat.  Lifestyle changes  Besides a healthy eating plan, other steps are needed to help control high blood pressure.     Stay at a healthy weight.    Be active for at least 30 minutes on most days.    If you drink alcohol, have no more than two drinks per day (for men) or one per day (for women).    If you take blood pressure medicine, take it as directed.  Losing weight with DASH  You can lose weight by eating fewer calories. It is best to take off pounds slowly over time. Talk to a dietitian about the best way to do this.  Staying active   To shed pounds, combine DASH with daily exercise. Start with a 15-minute walk each day. Slowly increase the time until you reach a total of 30 minutes on most days. To avoid weight gain, try for 60 minutes each day. Check with your doctor before starting any exercise program.  Tips for less sodium    Avoid processed foods. These may include baked goods, cereals, soy sauce and even antacids.    Cook with less salt. Don't bring the saltshaker to the table.    Season food with herbs, spices, lemon, lime, vinegar, wine and salt-free seasoning blends.    Use low-sodium canned vegetables or fruits.  Tips to ease the change  Take a week or two to slowly make changes to your diet.    Add a  serving of vegetables at lunch one day. The next day, add a serving at dinner as well.    Add fruit to one meal or eat it as a snack.    Work up to three servings of fat-free and low-fat milk products each day.    If you eat large portions of meat, cut back by a third at each meal. The goal is to eat 6 oz (ounces) of meat or less per day.    Serve brown rice and whole-wheat bread and pasta.    Try casseroles and stir-leonardo dishes that have less meat and more vegetables, grains or cooked dry beans.    Serve two or more meatless meals each week.    For snacks and desserts, eat foods low in fat, sodium and sugar, such as:  ? Unsalted rice cakes, nuts or seeds  ? Fresh fruits, raw vegetables and raisins  ? Fat-free, low-fat or frozen yogurt  ? Popcorn with no salt or butter.    If you have trouble digesting milk products, try lactose-free milk or take lactase pills.    If you have a nut allergy, eat seeds, beans, lentils or split peas.    Fruits, vegetables and whole grain foods are high in fiber. To avoid bloating and diarrhea (loose, watery stools), increase these foods over several weeks.  The DASH eating plan  Use this chart to plan your meals. Or take it with you when you shop for food.   Food Group Servings per Day  Serving Size Examples    1,600 Calories 2,000 Calories 2,600 Calories      Grains  (whole wheat) 6 6 to 8 10 to 11   1 slice bread    1 oz dry cereal      cup cooked rice, pasta or cereal Whole-wheat bread and rolls, whole-wheat pasta, English muffin, jorden bread, bagel, cereals, grits, oatmeal, brown rice, unsalted pretzels and popcorn   Vegetables  3 to 4 4 to 5 5 to 6   1 cup raw leafy vegetables      cup cut-up raw or cooked vegetable      cup vegetable juice Broccoli, carrots, collards, green beans, green peas, kale, lima beans, potatoes, spinach, squash, sweet potatoes, tomatoes   Fruits 4 4 to 5 5 to 6   1 medium fruit      cup dried fruit      cup fresh, frozen, or canned fruit      cup fruit  juice Apples, apricots, bananas, dates, grapes, oranges, grapefruit, mangoes, melons, peaches, pineapples, raisins, strawberries, tangerines   Fat-free or   low-fat milk and milk products 2 to 3 2 to 3 3   1 cup milk or yogurt    1   oz cheese Fat-free or low-fat (1%) milk, buttermilk, cheese, regular or frozen yogurt   Lean meats, poultry and fish 3 to 6 6 or less 6   1 oz cooked meats, poultry (chicken, turkey) or fish    1 egg    2 egg whites Lean meats (trim away any fat, then broil, roast or poach); remove skin from poultry. Eggs are high in cholesterol, so limit egg yolks to four or less per week.   Nuts, seeds   and legumes 3 per week 4 to 5 per week 1 per day   ? cup (1   oz) nuts    2 Tbsp peanut butter    2 Tbsp (   oz) seeds      cup cooked legumes (dry beans and peas) Almonds, hazelnuts, mixed nuts, peanuts, walnuts, sunflower seeds, peanut butter, kidney beans, lentils, split peas   Fats and oils 2 2 to 3 3   1 tsp soft margarine    1 tsp vegetable oil    1 Tbsp patel    2 Tbsp salad dressing Soft margarine, vegetable oil (such as canola, corn, olive or safflower), low-fat mayonnaise, light salad dressing   Sweets and   added sugars 0 5 or less per week 2 or less per day   1 Tbsp sugar, jelly or jam      cup sorbet or gelatin    1 cup lemonade Fruit-flavored gelatin, fruit punch, hard candy, jelly, maple syrup, sorbets and ices   A sample meal plan  This sample menu gives two sodium levels. Start with 2,300 mg of sodium (about 1 teaspoon of table salt per day). Then, try to lower your intake to 1,500 mg a day. Talk to your doctor or dietitian about how to do this.   These samples are for people who eat 2,000 calories per day. The less salt you eat, the more you may lower your blood pressure.   Menu for 2,300 mg sodium per day   Breakfast:   cup instant oatmeal, 1 mini whole-wheat bagel, 1 tablespoon peanut butter, 1 medium banana, 1 cup (8 ounces) low-fat milk.   Lunch: 1 cup cantaloupe chunks, 1 cup  "apple juice and one chicken breast sandwich that includes:    Two slices (3 ounces) chicken breast, no skin    Two slices whole-wheat bread    1 slice (   ounce) reduced-fat cheddar cheese    One leaf sari lettuce    Two slices tomato    1 tablespoon low-fat mayonnaise.  Dinner: 1 cup cooked spaghetti,   cup low-salt vegetarian spaghetti sauce, 3 tablespoons Parmesan cheese;   cup corn (from frozen);   cup canned pears in juice; one spinach salad that includes:    1 cup fresh spinach leaves      cup fresh carrots, grated      cup fresh mushrooms, sliced    1 tablespoon vinegar and oil dressing.  Snacks:? cup unsalted almonds;   cup dried apricots; 1 cup fat-free fruit yogurt, no sugar added.  Reducing to 1,500 mg sodium per day  Use the same menu plan, but:    For breakfast, replace instant oatmeal with regular oatmeal and 1 teaspoon cinnamon.    For lunch, replace cheddar cheese with low-sodium Swiss cheese.  Resources on diet and health  National Heart, [employer] and [employer] [place]  [facility]  [address] [address]   [place] [address]   Phone: [phone]   TTY: [phone]   www.nhlbi.nih.gov   \"Aim for a Healthy Weight\"   www.nhlbi.nih.gov/health/public/heart/obesity/lose_wt/index.htm  \"Dietary Guidelines for Americans 2005\"   and \"A Healthier You\"   www.healthierus.gov/dietaryguidelines  For informational purposes only. Not to replace the advice of your health care provider. Adapted from \"Your Guide to Lowering Blood Pressure with DASH,\" by the National Heart, Lung and [employer] [place],   NIH Publication No. , revised April 2006. Available at www.nhlbi.nih.gov/health/public/heart/hbp/dash/index.htm. Published by [employer]. [employer] 935986 - REV 09/15.  For informational purposes only. Not to replace the advice of your health care provider.  Copyright   2018 yepme.com. All rights reserved.           "

## 2021-04-28 NOTE — PROGRESS NOTES
"    Assessment & Plan     Essential hypertension  Discussed diagnosis of hypertension.  Recommend treat with medication.  Continue to work on diet and activity modifications as able but with his limited schedule right now we will treat with medication.  Recheck BMP and blood pressure in 3 to 4 weeks.  - hydrochlorothiazide (HYDRODIURIL) 12.5 MG tablet; Take 1 tablet (12.5 mg) by mouth daily  - EKG 12-lead complete w/read - Clinics    Nicotine use disorder  - buPROPion (WELLBUTRIN SR) 150 MG 12 hr tablet; TAKE 1 TABLET BY MOUTH TWICE A DAY    Recurrent cold sores  - acyclovir (ZOVIRAX) 400 MG tablet; TAKE 1 TABLET BY MOUTH TWICE A DAY             BMI:   Estimated body mass index is 31.08 kg/m  as calculated from the following:    Height as of this encounter: 1.918 m (6' 3.5\").    Weight as of this encounter: 114.3 kg (252 lb).           Return in about 1 month (around 5/28/2021) for lab-only visit, nurse blood pressure check.    Shiraz Gil MD  [facility]edeno is a 45 year old who presents for the following health issues     History of Present Illness       He eats 0-1 servings of fruits and vegetables daily.He consumes 0 sweetened beverage(s) daily.He exercises with enough effort to increase his heart rate 10 to 19 minutes per day.  He exercises with enough effort to increase his heart rate 3 or less days per week.   He is taking medications regularly.     Hypertension Follow-up      Do you check your blood pressure regularly outside of the clinic? No     Are you following a low salt diet? No    Are your blood pressures ever more than 140 on the top number (systolic) OR more   than 90 on the bottom number (diastolic), for example 140/90? No    Taking bupropion to help with tobacco cessation with chewing tobacco.  Has taken this intermittently with success but continues to have difficulty staying off tobacco consistently.    History of recurrent cold sores for which he " "takes acyclovir regularly for prevention.    Ongoing elevated blood pressures.  Has had success treating with diet and activity in the past but has had limited time for exercise and diet modification with work issues.  Family history of hypertension with mother.    Review of Systems         Objective    BP (!) 142/102 (Cuff Size: Adult Large)   Pulse 89   Temp 98.4  F (36.9  C) (Oral)   Resp 16   Ht 1.918 m (6' 3.5\")   Wt 114.3 kg (252 lb)   SpO2 97%   BMI 31.08 kg/m    Body mass index is 31.08 kg/m .  Physical Exam   GENERAL: healthy, alert and no distress  CV: regular rate and rhythm, normal S1 S2, no S3 or S4, no murmur, click or rub, no peripheral edema and peripheral pulses strong                "

## 2021-05-20 ENCOUNTER — IMMUNIZATION (OUTPATIENT)
Dept: NURSING | Facility: CLINIC | Age: 46
End: 2021-05-20
Attending: INTERNAL MEDICINE
Payer: COMMERCIAL

## 2021-05-20 PROCEDURE — 0012A PR COVID VAC MODERNA 100 MCG/0.5 ML IM: CPT

## 2021-05-20 PROCEDURE — 91301 PR COVID VAC MODERNA 100 MCG/0.5 ML IM: CPT

## 2021-05-25 DIAGNOSIS — I10 ESSENTIAL HYPERTENSION: ICD-10-CM

## 2021-05-26 RX ORDER — HYDROCHLOROTHIAZIDE 12.5 MG/1
12.5 TABLET ORAL DAILY
Qty: 30 TABLET | Refills: 1 | OUTPATIENT
Start: 2021-05-26

## 2021-05-26 NOTE — TELEPHONE ENCOUNTER
Requesting too soon. E-Prescribing Status: Receipt confirmed by pharmacy (4/28/2021  9:24 AM CDT) x 1 refill.   Millie Javed RN

## 2021-07-04 DIAGNOSIS — I10 ESSENTIAL HYPERTENSION: ICD-10-CM

## 2021-07-06 RX ORDER — HYDROCHLOROTHIAZIDE 12.5 MG/1
12.5 TABLET ORAL DAILY
Qty: 30 TABLET | Refills: 0 | Status: SHIPPED | OUTPATIENT
Start: 2021-07-06 | End: 2021-08-04

## 2021-08-03 DIAGNOSIS — I10 ESSENTIAL HYPERTENSION: ICD-10-CM

## 2021-08-04 RX ORDER — HYDROCHLOROTHIAZIDE 12.5 MG/1
TABLET ORAL
Qty: 30 TABLET | Refills: 0 | Status: SHIPPED | OUTPATIENT
Start: 2021-08-04 | End: 2021-08-12

## 2021-08-04 NOTE — TELEPHONE ENCOUNTER
Routing refill request to provider for review/approval because:  Robina given x1 and patient did not follow up, please advise  Labs out of range:  BP  Labs not current:  BP, creatinine, potassium, sodium    BP Readings from Last 3 Encounters:   04/28/21 (!) 142/102   02/01/21 (!) 143/91   02/19/20 122/84     Rangel CORTES RN

## 2021-08-06 DIAGNOSIS — I10 ESSENTIAL HYPERTENSION: ICD-10-CM

## 2021-08-06 NOTE — TELEPHONE ENCOUNTER
Routing refill request to provider for review/approval because:  Robina given x1 and patient did not follow up, please advise  Labs out of range:  BP  Labs not current:  creatinine, potassium, sodium    Rangel CORTES RN

## 2021-08-09 RX ORDER — HYDROCHLOROTHIAZIDE 12.5 MG/1
TABLET ORAL
Qty: 30 TABLET | Refills: 0 | OUTPATIENT
Start: 2021-08-09

## 2021-08-09 NOTE — TELEPHONE ENCOUNTER
Called patient and left a message. Informed the patient that he would need an appointment before having a refill.     Ted Abbott, [facility]  [phone]  August 9, 2021, 11:33 AM

## 2021-08-09 NOTE — TELEPHONE ENCOUNTER
Already given crissy refill and did not follow up, no additional refills until seen.  Please call pt to schedule an in person annual exam with Dr. Gil.

## 2021-08-12 RX ORDER — HYDROCHLOROTHIAZIDE 12.5 MG/1
TABLET ORAL
Qty: 30 TABLET | Refills: 0 | Status: SHIPPED | OUTPATIENT
Start: 2021-08-12 | End: 2021-09-08

## 2021-08-12 NOTE — TELEPHONE ENCOUNTER
Patient called stating that he is out of hydrochlorothiazide for the past week. He thinks that if he were to come in for a nurse visit BP check with labs it wont be accurate. He wonders if he could have a short term refill of his hydrochlorothiazide and then be seen for a nurse only BP check.     Patient has noticed some slight edema in his feet after going off the bupropion. He denies headache symptoms.     Verito Park RN on 8/12/2021 at 1:19 PM

## 2021-09-04 DIAGNOSIS — I10 ESSENTIAL HYPERTENSION: ICD-10-CM

## 2021-09-07 NOTE — TELEPHONE ENCOUNTER
Routing refill request to provider for review/approval because:  Labs out of range:  BP  Labs not current:  creatinine, potassium, sodium    BP Readings from Last 3 Encounters:   04/28/21 (!) 142/102   02/01/21 (!) 143/91   02/19/20 122/84     Rangel CORTES RN

## 2021-09-08 RX ORDER — HYDROCHLOROTHIAZIDE 12.5 MG/1
TABLET ORAL
Qty: 30 TABLET | Refills: 0 | Status: SHIPPED | OUTPATIENT
Start: 2021-09-08 | End: 2021-09-15

## 2021-09-15 ENCOUNTER — ANCILLARY PROCEDURE (OUTPATIENT)
Dept: GENERAL RADIOLOGY | Facility: CLINIC | Age: 46
End: 2021-09-15
Attending: FAMILY MEDICINE
Payer: COMMERCIAL

## 2021-09-15 ENCOUNTER — OFFICE VISIT (OUTPATIENT)
Dept: FAMILY MEDICINE | Facility: CLINIC | Age: 46
End: 2021-09-15
Payer: COMMERCIAL

## 2021-09-15 VITALS
BODY MASS INDEX: 31.08 KG/M2 | WEIGHT: 250 LBS | SYSTOLIC BLOOD PRESSURE: 130 MMHG | OXYGEN SATURATION: 97 % | HEIGHT: 75 IN | HEART RATE: 85 BPM | DIASTOLIC BLOOD PRESSURE: 86 MMHG | TEMPERATURE: 98.4 F | RESPIRATION RATE: 16 BRPM

## 2021-09-15 DIAGNOSIS — Z23 NEED FOR PROPHYLACTIC VACCINATION AND INOCULATION AGAINST INFLUENZA: ICD-10-CM

## 2021-09-15 DIAGNOSIS — M51.379 DEGENERATIVE DISC DISEASE AT L5-S1 LEVEL: ICD-10-CM

## 2021-09-15 DIAGNOSIS — M54.50 CHRONIC MIDLINE LOW BACK PAIN WITHOUT SCIATICA: ICD-10-CM

## 2021-09-15 DIAGNOSIS — G89.29 CHRONIC MIDLINE LOW BACK PAIN WITHOUT SCIATICA: ICD-10-CM

## 2021-09-15 DIAGNOSIS — Z11.59 ENCOUNTER FOR HEPATITIS C SCREENING TEST FOR LOW RISK PATIENT: ICD-10-CM

## 2021-09-15 DIAGNOSIS — I10 ESSENTIAL HYPERTENSION: Primary | ICD-10-CM

## 2021-09-15 DIAGNOSIS — Z11.4 ENCOUNTER FOR SCREENING FOR HIV: ICD-10-CM

## 2021-09-15 DIAGNOSIS — Z12.11 COLON CANCER SCREENING: ICD-10-CM

## 2021-09-15 LAB
ANION GAP SERPL CALCULATED.3IONS-SCNC: 4 MMOL/L (ref 3–14)
BUN SERPL-MCNC: 14 MG/DL (ref 7–30)
CALCIUM SERPL-MCNC: 9.1 MG/DL (ref 8.5–10.1)
CHLORIDE BLD-SCNC: 109 MMOL/L (ref 94–109)
CHOLEST SERPL-MCNC: 237 MG/DL
CO2 SERPL-SCNC: 27 MMOL/L (ref 20–32)
CREAT SERPL-MCNC: 1.04 MG/DL (ref 0.66–1.25)
FASTING STATUS PATIENT QL REPORTED: YES
GFR SERPL CREATININE-BSD FRML MDRD: 87 ML/MIN/1.73M2
GLUCOSE BLD-MCNC: 98 MG/DL (ref 70–99)
HDLC SERPL-MCNC: 46 MG/DL
LDLC SERPL CALC-MCNC: 166 MG/DL
NONHDLC SERPL-MCNC: 191 MG/DL
POTASSIUM BLD-SCNC: 4 MMOL/L (ref 3.4–5.3)
SODIUM SERPL-SCNC: 140 MMOL/L (ref 133–144)
TRIGL SERPL-MCNC: 127 MG/DL

## 2021-09-15 PROCEDURE — 90472 IMMUNIZATION ADMIN EACH ADD: CPT | Performed by: FAMILY MEDICINE

## 2021-09-15 PROCEDURE — 80061 LIPID PANEL: CPT | Performed by: FAMILY MEDICINE

## 2021-09-15 PROCEDURE — 72100 X-RAY EXAM L-S SPINE 2/3 VWS: CPT | Performed by: RADIOLOGY

## 2021-09-15 PROCEDURE — 36415 COLL VENOUS BLD VENIPUNCTURE: CPT | Performed by: FAMILY MEDICINE

## 2021-09-15 PROCEDURE — 90686 IIV4 VACC NO PRSV 0.5 ML IM: CPT | Performed by: FAMILY MEDICINE

## 2021-09-15 PROCEDURE — 90471 IMMUNIZATION ADMIN: CPT | Performed by: FAMILY MEDICINE

## 2021-09-15 PROCEDURE — 72200 X-RAY EXAM SI JOINTS: CPT | Performed by: RADIOLOGY

## 2021-09-15 PROCEDURE — 86803 HEPATITIS C AB TEST: CPT | Performed by: FAMILY MEDICINE

## 2021-09-15 PROCEDURE — 90715 TDAP VACCINE 7 YRS/> IM: CPT | Performed by: FAMILY MEDICINE

## 2021-09-15 PROCEDURE — 80048 BASIC METABOLIC PNL TOTAL CA: CPT | Performed by: FAMILY MEDICINE

## 2021-09-15 PROCEDURE — 87389 HIV-1 AG W/HIV-1&-2 AB AG IA: CPT | Performed by: FAMILY MEDICINE

## 2021-09-15 PROCEDURE — 99214 OFFICE O/P EST MOD 30 MIN: CPT | Mod: 25 | Performed by: FAMILY MEDICINE

## 2021-09-15 RX ORDER — HYDROCHLOROTHIAZIDE 12.5 MG/1
12.5 TABLET ORAL DAILY
Qty: 90 TABLET | Refills: 3 | Status: SHIPPED | OUTPATIENT
Start: 2021-09-15 | End: 2022-10-04

## 2021-09-15 ASSESSMENT — MIFFLIN-ST. JEOR: SCORE: 2101.68

## 2021-09-15 NOTE — PATIENT INSTRUCTIONS
Patient Education     Lowering Your Blood Pressure with DASH  What is the DASH eating plan?  DASH (Dietary Approaches to Stop Hypertension) can help you prevent or lower high blood pressure. This eating plan provides the nutrients that help lower blood pressure: potassium, magnesium, calcium, protein and fiber.   If not controlled, high blood pressure may lead to heart disease, stroke or blindness.  This eating plan is rich in:     Fruits and vegetables    Whole grains    Fat-free or low-fat milk products    Fish and poultry (chicken, turkey, etc.)    Beans, seeds and nuts.  This eating plan is low in:     Salt and sodium    Sugar, sweets and drinks with sugar    Red meat, saturated fat and trans fat.  Lifestyle changes  Besides a healthy eating plan, other steps are needed to help control high blood pressure.     Stay at a healthy weight.    Be active for at least 30 minutes on most days.    If you drink alcohol, have no more than two drinks per day (for men) or one per day (for women).    If you take blood pressure medicine, take it as directed.  Losing weight with DASH  You can lose weight by eating fewer calories. It is best to take off pounds slowly over time. Talk to a dietitian about the best way to do this.  Staying active   To shed pounds, combine DASH with daily exercise. Start with a 15-minute walk each day. Slowly increase the time until you reach a total of 30 minutes on most days. To avoid weight gain, try for 60 minutes each day. Check with your doctor before starting any exercise program.  Tips for less sodium    Avoid processed foods. These may include baked goods, cereals, soy sauce and even antacids.    Cook with less salt. Don't bring the saltshaker to the table.    Season food with herbs, spices, lemon, lime, vinegar, wine and salt-free seasoning blends.    Use low-sodium canned vegetables or fruits.  Tips to ease the change  Take a week or two to slowly make changes to your diet.    Add a  serving of vegetables at lunch one day. The next day, add a serving at dinner as well.    Add fruit to one meal or eat it as a snack.    Work up to three servings of fat-free and low-fat milk products each day.    If you eat large portions of meat, cut back by a third at each meal. The goal is to eat 6 oz (ounces) of meat or less per day.    Serve brown rice and whole-wheat bread and pasta.    Try casseroles and stir-leonardo dishes that have less meat and more vegetables, grains or cooked dry beans.    Serve two or more meatless meals each week.    For snacks and desserts, eat foods low in fat, sodium and sugar, such as:  ? Unsalted rice cakes, nuts or seeds  ? Fresh fruits, raw vegetables and raisins  ? Fat-free, low-fat or frozen yogurt  ? Popcorn with no salt or butter.    If you have trouble digesting milk products, try lactose-free milk or take lactase pills.    If you have a nut allergy, eat seeds, beans, lentils or split peas.    Fruits, vegetables and whole grain foods are high in fiber. To avoid bloating and diarrhea (loose, watery stools), increase these foods over several weeks.  The DASH eating plan  Use this chart to plan your meals. Or take it with you when you shop for food.   Food Group Servings per Day  Serving Size Examples    1,600 Calories 2,000 Calories 2,600 Calories      Grains  (whole wheat) 6 6 to 8 10 to 11   1 slice bread    1 oz dry cereal      cup cooked rice, pasta or cereal Whole-wheat bread and rolls, whole-wheat pasta, English muffin, jorden bread, bagel, cereals, grits, oatmeal, brown rice, unsalted pretzels and popcorn   Vegetables  3 to 4 4 to 5 5 to 6   1 cup raw leafy vegetables      cup cut-up raw or cooked vegetable      cup vegetable juice Broccoli, carrots, collards, green beans, green peas, kale, lima beans, potatoes, spinach, squash, sweet potatoes, tomatoes   Fruits 4 4 to 5 5 to 6   1 medium fruit      cup dried fruit      cup fresh, frozen, or canned fruit      cup fruit  juice Apples, apricots, bananas, dates, grapes, oranges, grapefruit, mangoes, melons, peaches, pineapples, raisins, strawberries, tangerines   Fat-free or   low-fat milk and milk products 2 to 3 2 to 3 3   1 cup milk or yogurt    1   oz cheese Fat-free or low-fat (1%) milk, buttermilk, cheese, regular or frozen yogurt   Lean meats, poultry and fish 3 to 6 6 or less 6   1 oz cooked meats, poultry (chicken, turkey) or fish    1 egg    2 egg whites Lean meats (trim away any fat, then broil, roast or poach); remove skin from poultry. Eggs are high in cholesterol, so limit egg yolks to four or less per week.   Nuts, seeds   and legumes 3 per week 4 to 5 per week 1 per day   ? cup (1   oz) nuts    2 Tbsp peanut butter    2 Tbsp (   oz) seeds      cup cooked legumes (dry beans and peas) Almonds, hazelnuts, mixed nuts, peanuts, walnuts, sunflower seeds, peanut butter, kidney beans, lentils, split peas   Fats and oils 2 2 to 3 3   1 tsp soft margarine    1 tsp vegetable oil    1 Tbsp patel    2 Tbsp salad dressing Soft margarine, vegetable oil (such as canola, corn, olive or safflower), low-fat mayonnaise, light salad dressing   Sweets and   added sugars 0 5 or less per week 2 or less per day   1 Tbsp sugar, jelly or jam      cup sorbet or gelatin    1 cup lemonade Fruit-flavored gelatin, fruit punch, hard candy, jelly, maple syrup, sorbets and ices   A sample meal plan  This sample menu gives two sodium levels. Start with 2,300 mg of sodium (about 1 teaspoon of table salt per day). Then, try to lower your intake to 1,500 mg a day. Talk to your doctor or dietitian about how to do this.   These samples are for people who eat 2,000 calories per day. The less salt you eat, the more you may lower your blood pressure.   Menu for 2,300 mg sodium per day   Breakfast:   cup instant oatmeal, 1 mini whole-wheat bagel, 1 tablespoon peanut butter, 1 medium banana, 1 cup (8 ounces) low-fat milk.   Lunch: 1 cup cantaloupe chunks, 1 cup  "apple juice and one chicken breast sandwich that includes:    Two slices (3 ounces) chicken breast, no skin    Two slices whole-wheat bread    1 slice (   ounce) reduced-fat cheddar cheese    One leaf sari lettuce    Two slices tomato    1 tablespoon low-fat mayonnaise.  Dinner: 1 cup cooked spaghetti,   cup low-salt vegetarian spaghetti sauce, 3 tablespoons Parmesan cheese;   cup corn (from frozen);   cup canned pears in juice; one spinach salad that includes:    1 cup fresh spinach leaves      cup fresh carrots, grated      cup fresh mushrooms, sliced    1 tablespoon vinegar and oil dressing.  Snacks:? cup unsalted almonds;   cup dried apricots; 1 cup fat-free fruit yogurt, no sugar added.  Reducing to 1,500 mg sodium per day  Use the same menu plan, but:    For breakfast, replace instant oatmeal with regular oatmeal and 1 teaspoon cinnamon.    For lunch, replace cheddar cheese with low-sodium Swiss cheese.  Resources on diet and health  National Heart, Lung and Blood [place]  [facility]  [address] [address]   [place] [address]   Phone: [phone]   TTY: [phone]   www.nhlbi.nih.gov   \"Aim for a Healthy Weight\"   www.nhlbi.nih.gov/health/public/heart/obesity/lose_wt/index.htm  \"Dietary Guidelines for Americans 2005\"   and \"A Healthier You\"   www.healthierus.gov/dietaryguidelines  For informational purposes only. Not to replace the advice of your health care provider. Adapted from \"Your Guide to Lowering Blood Pressure with DASH,\" by the National Heart, Lung and Blood [place],   NIH Publication No. , revised April 2006. Available at www.nhlbi.nih.gov/health/public/heart/hbp/dash/index.htm. Published by [employer]. [employer] 714346 - REV 09/15.  For informational purposes only. Not to replace the advice of your health care provider.  Copyright   2018 [employer]. All rights reserved.           "

## 2021-09-15 NOTE — PROGRESS NOTES
"    Assessment & Plan     Essential hypertension  - Lipid panel reflex to direct LDL Fasting  - Basic metabolic panel  (Ca, Cl, CO2, Creat, Gluc, K, Na, BUN)  - hydrochlorothiazide (HYDRODIURIL) 12.5 MG tablet; Take 1 tablet (12.5 mg) by mouth daily  - blood pressure 130/86, continue current hydrochlorothiazide     Encounter for screening for HIV  - HIV Antigen Antibody Combo    Encounter for hepatitis C screening test for low risk patient  - Hepatitis C Screen Reflex to HCV RNA Quant and Genotype    Chronic midline low back pain without sciatica  - XR Lumbar Spine 2/3 Views; Future  - XR Sacroiliac Joint 1/2 Views; Future  - Pain Management Referral; Future  - [facility] Referral; Future  - Chronic low back pain. L5-S1 arthritis. Recommend physical therapy and pain clinic referral for injection. If back pain has no improvement from those interventions, patient may need surgery consult in the future.     Degenerative disc disease at L5-S1 level  - Pain Management Referral; Future  - [facility] Referral; Future  - Chronic low back pain. L5-S1 arthritis. Recommend physical therapy and pain clinic referral for injection. If back pain has no improvement from those interventions, patient may need surgery consult in the future.     Colon cancer screening  - Adult Gastro Ref - Procedure Only; Future  - Patient concerned due to maternal grandfather recent diagnosis of colon cancer    Need for prophylactic vaccination and inoculation against influenza  - INFLUENZA VACCINE IM > 6 MONTHS VALENT IIV4 (AFLURIA/FLUZONE)             BMI:   Estimated body mass index is 31.67 kg/m  as calculated from the following:    Height as of this encounter: 1.892 m (6' 2.5\").    Weight as of this encounter: 113.4 kg (250 lb).   Weight management plan: Discussed healthy diet and exercise guidelines    No follow-ups on file.    Shiraz Gil MD  [facility]eno is a 45 year old male with a " "history of hypertension who presents to the clinic for laboratory work and blood pressure re-check. The patient reports concerns of chronic back pain that has worsened. The pain is located on the right lower back and it is a sharp pain with exertion. The patient reports going to the chiropractor with no pain improvement. Denies numbness, tingling and bowel/bladder changes.     History of Present Illness       He eats 2-3 servings of fruits and vegetables daily.He consumes 0 sweetened beverage(s) daily.He exercises with enough effort to increase his heart rate 20 to 29 minutes per day.  He exercises with enough effort to increase his heart rate 3 or less days per week.   He is taking medications regularly.       Medication Followup of hydrochlorothiazide    Taking Medication as prescribed: yes    Side Effects:  None    Medication Helping Symptoms:  yes         Review of Systems   EYES: NEGATIVE for vision changes or irritation  RESP:NEGATIVE for significant cough or SOB  CV: NEGATIVE for chest pain, palpitations or peripheral edema  GI: NEGATIVE for nausea, abdominal pain, heartburn, or change in bowel habits  : negative for dysuria, hematuria, decreased urinary stream  MUSCULOSKELETAL: POSITIVE for right lower chronic back pain. Denies numbness and tingling.   NEURO: NEGATIVE for weakness, dizziness or paresthesias      Objective    /86 (BP Location: Right arm, Patient Position: Sitting, Cuff Size: Adult Large)   Pulse 85   Temp 98.4  F (36.9  C) (Oral)   Resp 16   Ht 1.892 m (6' 2.5\")   Wt 113.4 kg (250 lb)   SpO2 97%   BMI 31.67 kg/m    Body mass index is 31.67 kg/m .  Physical Exam   GENERAL: healthy, alert and no distress  EYES: Eyes grossly normal to inspection, PERRL and conjunctivae and sclerae normal  HENT: ear canals and TM's normal, nose and mouth without ulcers or lesions  NECK: no adenopathy, no asymmetry, masses, or scars and thyroid normal to palpation  RESP: lungs clear to auscultation " - no rales, rhonchi or wheezes  CV: regular rate and rhythm, normal S1 S2, no S3 or S4, no murmur, click or rub, no peripheral edema and peripheral pulses strong  ABDOMEN: soft, nontender, no masses and bowel sounds normal  MS: Mild tenderness with palpitation over right lower back. Back has decreased ROM with flexion.     X-ray lumbar spine/ SI joint ordered and interpreted in the office today. X-ray shows: Advanced degenerative changes at L5-S1, mild changes with osteophytes at a few other levels but normal disc space, SI joints normal.  Radiology read pending.      Physician Attestation   I, Shiraz Gil, was present with the medical/KOB[employer] student who participated in the service and in the documentation of the note.  I have verified the history and personally performed the physical exam and medical decision making.  I agree with the assessment and plan of care as documented in the note.      Items personally reviewed: vitals, labs and imaging and agree with the interpretation documented in the note.    Shiraz Gil MD

## 2021-09-17 LAB
HCV AB SERPL QL IA: NONREACTIVE
HIV 1+2 AB+HIV1 P24 AG SERPL QL IA: NONREACTIVE

## 2021-09-19 ENCOUNTER — TELEPHONE (OUTPATIENT)
Dept: PALLIATIVE MEDICINE | Facility: CLINIC | Age: 46
End: 2021-09-19

## 2021-09-19 DIAGNOSIS — G89.29 CHRONIC MIDLINE LOW BACK PAIN WITHOUT SCIATICA: Primary | ICD-10-CM

## 2021-09-19 DIAGNOSIS — M51.379 DEGENERATIVE DISC DISEASE AT L5-S1 LEVEL: ICD-10-CM

## 2021-09-19 DIAGNOSIS — M54.50 CHRONIC MIDLINE LOW BACK PAIN WITHOUT SCIATICA: Primary | ICD-10-CM

## 2021-09-19 NOTE — TELEPHONE ENCOUNTER
My Clinical Question Is:   L5-S1 advanced arthritis            Timeframe Requested:   Routine: Next available opening            Priority:   Routine [1]            Reason for Referral:   Procedure Order            Procedure:   Injection to be Determined by Pain Management Specialist            Preferred Location:   [facility] - Various Locations

## 2021-09-20 NOTE — TELEPHONE ENCOUNTER
Would consider right L4/5 and L5/S1 facet joint injections, but Pt would likely benefit from procedure consult. Copying [place] providers for final decision as Pt lives in [place].     Moriah Haji MD  [facility]

## 2021-09-20 NOTE — TELEPHONE ENCOUNTER
No advanced imaging available for review, They may need an MRI. Agree with procedural consult to determine the correct type of injection. Will route and send message to referring provider.    Amor Villarreal DO  [facility]

## 2021-09-20 NOTE — TELEPHONE ENCOUNTER
Attempted to reach patient, LVMTCB. Need to relay provider message below. Patient should be contacted to schedule MRI.     Rangel CORTES RN

## 2021-09-21 PROBLEM — E78.5 HYPERLIPIDEMIA LDL GOAL <130: Status: ACTIVE | Noted: 2021-09-21

## 2021-09-21 NOTE — TELEPHONE ENCOUNTER
I don't remember a discussion for nutrition referral.  We can certainly give him their number but it is unlikely to be covered for HTN or overweight as most insurance do not cover and would be all out of pocket cost.  Recommend DASH diet - lots of resources at library or other on this topic, reduce alcohol, reduce salt/sodium, regular exercise, and weight loss.

## 2021-09-21 NOTE — TELEPHONE ENCOUNTER
Called and spoke with patient, relayed provider messages below. Patient would like to call insurance prior to having MRI to check on coverage. Awaiting call back from patient.     Rangel CORTES RN

## 2021-09-21 NOTE — TELEPHONE ENCOUNTER
"Received call back from patient. Patient spoke with insurance and it is still unclear how much he would have to pay but he would like to proceed with scheduling/ordering MRI. Patient also inquired, \"I had thought we discussed my seeing a nutritionist\". Please review and advise on nutritionist referral.     Rangel CORTES RN    "

## 2021-09-29 ENCOUNTER — HOSPITAL ENCOUNTER (OUTPATIENT)
Dept: MRI IMAGING | Facility: CLINIC | Age: 46
Discharge: HOME | End: 2021-09-29
Attending: FAMILY MEDICINE | Admitting: FAMILY MEDICINE
Payer: COMMERCIAL

## 2021-09-29 DIAGNOSIS — M54.50 CHRONIC MIDLINE LOW BACK PAIN WITHOUT SCIATICA: ICD-10-CM

## 2021-09-29 DIAGNOSIS — G89.29 CHRONIC MIDLINE LOW BACK PAIN WITHOUT SCIATICA: ICD-10-CM

## 2021-09-29 DIAGNOSIS — M51.379 DEGENERATIVE DISC DISEASE AT L5-S1 LEVEL: ICD-10-CM

## 2021-09-29 PROCEDURE — 72148 MRI LUMBAR SPINE W/O DYE: CPT

## 2021-10-12 ENCOUNTER — TELEPHONE (OUTPATIENT)
Dept: PALLIATIVE MEDICINE | Facility: CLINIC | Age: 46
End: 2021-10-12

## 2021-10-12 NOTE — TELEPHONE ENCOUNTER
If you look at the telephone encounter from 9/19 he was supposed to have a interventional consult in .  However, we may be able to figure this out without having him come in again as there are limited clinic appointments available right now.     Please call the patient and ask about the nature of his back pain.      Is it low back pain or radiating leg pain or both?  If it does radiate ... where?      Has minimal facet arthropathy on LUMBAR MRI.     I will route to LAMBERT.     Gricelda Armstrong MD

## 2021-10-12 NOTE — TELEPHONE ENCOUNTER
Order received for Injection to be Determined by Pain Management Specialist.    Routing to review.    Suly RANKIN    [facility]

## 2021-10-14 NOTE — TELEPHONE ENCOUNTER
L5-S1 lumbar interlaminar epidural steroid injection.    Amor Villarreal [facility]

## 2021-10-14 NOTE — TELEPHONE ENCOUNTER
Called pt.  He has R-sided low back pain into his R hip and down the leg to the middle of the hamstring. He has had slight weakness in R leg for 4-6 months but no numbness or tingling.     Routed to interventionist pool to determine exact injection and levels to be done.    Abbie RN-BSN  [facility]

## 2021-10-15 NOTE — TELEPHONE ENCOUNTER
Screening Questions for Radiology Injections:    Injection to be done at which interventional clinic site? [facility]    If [facility] location, tell patient that this procedure requires a COVID-19 lab test be done within 4 days of the procedure. Would you still like to move forward with scheduling the procedure?  Not Applicable   If YES, let patient know that someone will call them to schedule the COVID-19 test and that they will only receive a call back if the result is positive. Route to nursing to enter order.     Instruct patient to arrive as directed prior to the scheduled appointment time:    [place] minutes before      [place]: 30 minutes before; if IV needed 1 hour before     Procedure ordered by Jeffery    Procedure ordered? L5-S1 lumbar interlaminar epidural steroid injection      Transforaminal Cervical MIGDALIA -  [place] does NOT have providers that do these- [facility] and [facility] do have providers that do    As a reminder, receiving steroids can decrease your body's ability to fight infection.   Would you still like to move forward with scheduling the injection?  Yes    What insurance would patient like us to bill for this procedure? [employer]      Worker's comp or MVA (motor vehicle accident) -Any injection DO NOT SCHEDULE and route to Celestina Nova.      [facility] - For SI joint injections, DO NOT SCHEDULE and route Celestina Nova.       ALL BCBS, [employer] and [employer] CIG[employer]-Route to Celestina for review DO NOT SCHEDULE      IF SCHEDULING IN [place] AND NEEDS A PA, IT IS OKAY TO SCHEDULE. [place] HANDLES THEIR OWN PA'S AFTER THE PATIENT IS SCHEDULED. PLEASE SCHEDULE AT LEAST 1 WEEK OUT SO A PA CAN BE OBTAINED.    Any chance of pregnancy? NO   If YES, do NOT schedule and route to RN pool    Is an  needed? No     Patient has a drive home? (mandatory) YES: ok    Is patient taking any blood thinners (i.e. plavix, coumadin, jantoven, warfarin, heparin, pradaxa or  dabigatran, etc)? No   If hold needed, do NOT schedule, route to RN pool     Is patient taking any aspirin products (includes Excedrin and Fiorinal)? No     If more than 325mg/day, OK to schedule; Instruct pt to decrease to less than 325 mg for 7 days AND route to RN pool    For CERVICAL procedures, hold all aspirin products for 6 days.     Tell pt that if aspirin product is not held for 6 days, the procedure WILL BE cancelled.      Does the patient have a bleeding or clotting disorder? No     If YES, okay to schedule AND route to RN nurse pool    For any patients with platelet count <100, must be forwarded to provider    Any allergies to contrast dye, iodine, shellfish, or numbing and steroid medications? No    If YES, add allergy information to appointment notes AND route to the RN pool     If MIGDALIA and Contrast Dye / Iodine Allergy? DO NOT SCHEDULE, route to RN pool    Allergies: Patient has no known allergies.     Is patient diabetic?  Yes  If YES, instruct them to bring their glucometer.    Does patient have an active infection or treated for one within the past week? No     Is patient currently taking any antibiotics?  No     For patients on chronic, preventative, or prophylactic antibiotics, procedures may be scheduled.     For patients on antibiotics for active or recent infection:antibiotic course must have been completed for 4 days    Is patient currently taking any steroid medications? (i.e. Prednisone, Medrol)  No     For patients on steroid medications, course must have been completed for 4 days    Is patient actively being treated for cancer or immunocompromised? No  If YES, do NOT schedule and route to RN pool     Are you able to get on and off an exam table with minimal or no assistance? Yes  If NO, do NOT schedule and route to RN pool    Are you able to roll over and lay on your stomach with minimal or no assistance? Yes  If NO, do NOT schedule and route to RN pool     Has the patient had a flu shot  or any other vaccinations within 7 days before or after the procedure.  No     Have you recently had a COVID vaccine or have plans to get it in the near future? No    If yes, explain that for the vaccine to work best they need to:       wait 1 week before and 1 week after getting Vaccine #1    wait 1 week before and 2 weeks after getting Vaccine #2    wait 1 week before and 2 weeks after getting Vaccine BOOSTER    If patient has concerns about the timing, send to RN pool     Does patient have an MRI/CT?  YES: MRI  Check Procedure Scheduling Grid to see if required.      Was the MRI done within the last 3 years?  Yes    If yes, where was the MRI done i.e.[facility], [facility], [facility], [facility] etc? [facility]      If no, do not schedule and route to RN pool    If MRI was not done at [place], [facility] or [facility] do NOT schedule and route to RN pool.      If pt has an imaging disc, the injection MAY be scheduled but pt has to bring disc to appt.     If they show up without the disc the injection cannot be done    Procedure Specific Instructions:      If celiac plexus block, informed patient NPO for 6 hours and that it is okay to take medications with sips of water, especially blood pressure medications  Not Applicable         If this is for a cervical procedure, informed patient that aspirin needs to be held for 6 days.   Not Applicable      If IV needed:    Do not schedule procedures requiring IV placement in the first appointment of the day or first appointment after lunch. Do NOT schedule at 0745, 0815 or 1245. ok    Instructed pt to arrive 30 minutes early for IV start if required. (Check Procedure Scheduling Grid)  Not Applicable    Reminders:      If you are started on any steroids or antibiotics between now and your appointment, you must contact us because the procedure may need to be cancelled.  Yes      For all procedures except radiofrequency ablations (RFAs) and spinal cord stimulator (SCS)  trials, informed patient:    IV sedation is not provided for this procedure.  If you feel that an oral anti-anxiety medication is needed, you can discuss this further with your referring provider or primary care provider.  The Pain Clinic provider will discuss specifics of what the procedure includes at your appointment.  Most procedures last 10-20 minutes.  We use numbing medications to help with any discomfort during the procedure.  Not Applicable      For patients 85 or older we recommend having an adult stay w/ them for the remainder of the day.   ok    Does the patient have any questions?  NO  Kenisha Zheng  [facility]

## 2021-10-17 DIAGNOSIS — Z11.59 ENCOUNTER FOR SCREENING FOR OTHER VIRAL DISEASES: ICD-10-CM

## 2021-10-18 NOTE — TELEPHONE ENCOUNTER
PA approved.  Effective date: 10/18/21-1/6/22  PA reference #: [id number]  Pt. notified:   OKROSY TO SCHEDULE L5/S1 ILESI WITH DR MELANY QUEVEDO    [facility]

## 2021-11-07 NOTE — PROGRESS NOTES
[facility] - Procedure Note    Date of Visit: 11/8/2021    Procedure performed: Lumbar L5-S1 interlaminar epidural steroid injection  Diagnosis: Lumbar spondylosis; Lumbar radiculitis/radiculopathy  : Amor Villarreal DO   Anesthesia: none    Indications: Paul Mckeon is a 46 year old male who is seen at the request of Dr. Gil for a lumbar epidural steroid injection. The patient describes low back pain with pain radiating towards the right buttock and right posterior thigh. The patient has been exhibiting symptoms consistent with lumbar intraspinal inflammation and radiculopathy. Symptoms have been persistent, disabling, and intermittently severe. The patient reports minimal improvement with conservative treatment, including oral medications and exercises.    Lumbar MRI was done on 9/29/21 which showed   L4-L5:  Disc height loss. Disc bulge with central annular fissure.  Mild bilateral facet arthropathy. No right foraminal stenosis. Mild  left foraminal stenosis. No spinal canal stenosis.       L5-S1:  Severe disc height loss. Presumed degenerative disc T2  hyperintensity, right worse than left. Disc bulge, slightly symmetric  to the right with broad central bulge versus protrusion component  which does not appear to impinge upon the traversing S1 nerve roots  within the lateral recesses. Mild bilateral facet arthropathy.  Moderate to severe bilateral foraminal stenosis. No spinal canal  stenosis.                                                                      IMPRESSION:    1. Degenerative change as detailed, worst at L5-S1.  2. Disc bulge at L4-L5 with central annular fissure.      XIN DANG MD          Allergies:    No Known Allergies     Vitals:  /85   Pulse 85   SpO2 95%     Review of Systems: The patient denies recent fever, chills, illness, use of antibiotics or anticoagulants. All other 10-point review of systems negative.     Procedure: The  procedure and risks were explained, and informed written consent was obtained from the patient. Risks include but are not limited to: infection, bleeding, increased pain, and damage to soft tissue, nerve, muscle, and vasculature structures. After getting informed consent, patient was brought into the procedure suite and was placed in a prone position on the procedure table. A Pause for the Cause was performed. Patient was prepped and draped in sterile fashion.     The L5-S1 interspace was identified with use of fluoroscopy in AP view. A 25-gauge, 1.5 inch needle was used to anesthetize the skin and subcutaneous tissue entry site with a total of 3ml of 1% lidocaine. Under fluoroscopic visualization, a 22-gauge, 4.5 inch Tuohy epidural needle was slowly advanced towards the epidural space a few millimeters right-sided of midline. The latter part of the needle advancement was guided with fluoroscopy in the lateral view. The epidural space was identified using loss of resistance technique. After negative aspiration for heme and cerebrospinal fluid, a total of 1 mL of non-ionic contrast was injected to confirm needle placement with 9 mL of contrast wasted. Epidurogram confirmed spread within the posterior epidural space. 1 ml of 40mg/ml of triamcinolone, 1 ml of 1% lidocaine, and 3 ml of preservative free saline was injected. The needle was removed.  Images were saved to PACS.    The patient tolerated the procedure well, and there was no evidence of procedural complications. No new sensory or motor deficits were noted following the procedure. The patient was stable and able to ambulate on discharge home. Post-procedure instructions were provided.     Pre-procedure pain score: 5/10 in the back, 3/10 in the leg  Post-procedure pain score: 1/10 in the back, 0/10 in the leg    Assessment/Plan: Paul Mckeon is a 46 year old male s/p lumbar interlaminar epidural steroid injection today for lumbar spondylosis and  radiculitis/radiculopathy.     1. Following today's procedure, the patient was advised to contact the [facility] for any of the following:   Fever, chills, or night sweats   New onset of pain, numbness, or weakness   Any questions/concerns regarding the procedure  If unable to contact the Pain Center, the patient was instructed to go to a local Emergency Room for any complications.   2. Follow-up with the referring provider in 2-4 weeks for post-procedure evaluation.        Amor Villarreal DO  [facility]

## 2021-11-08 ENCOUNTER — RADIOLOGY INJECTION OFFICE VISIT (OUTPATIENT)
Dept: PALLIATIVE MEDICINE | Facility: CLINIC | Age: 46
End: 2021-11-08
Payer: COMMERCIAL

## 2021-11-08 VITALS — DIASTOLIC BLOOD PRESSURE: 85 MMHG | SYSTOLIC BLOOD PRESSURE: 137 MMHG | HEART RATE: 85 BPM | OXYGEN SATURATION: 95 %

## 2021-11-08 DIAGNOSIS — M54.16 LUMBAR RADICULOPATHY: Primary | ICD-10-CM

## 2021-11-08 DIAGNOSIS — M47.816 SPONDYLOSIS OF LUMBAR REGION WITHOUT MYELOPATHY OR RADICULOPATHY: ICD-10-CM

## 2021-11-08 PROCEDURE — 62323 NJX INTERLAMINAR LMBR/SAC: CPT | Performed by: PHYSICAL MEDICINE & REHABILITATION

## 2021-11-08 RX ORDER — TRIAMCINOLONE ACETONIDE 40 MG/ML
40 INJECTION, SUSPENSION INTRA-ARTICULAR; INTRAMUSCULAR ONCE
Status: COMPLETED | OUTPATIENT
Start: 2021-11-08 | End: 2021-11-08

## 2021-11-08 RX ADMIN — TRIAMCINOLONE ACETONIDE 40 MG: 40 INJECTION, SUSPENSION INTRA-ARTICULAR; INTRAMUSCULAR at 15:57

## 2021-11-08 NOTE — PATIENT INSTRUCTIONS
[facility] Procedure Discharge Instructions    Today you saw:    Dr. Amor Villarreal    Your procedure:  Epidural steroid injection       Medications used:  Lidocaine (anesthetic)  Kenalog (steroid)  Omnipaque (contrast)            Be cautious when walking as numbness and/or weakness in the legs may occur up to 6-8 hours after the procedure due to effect of the local anesthetic    Do not drive for 6 hours. The effect of the local anesthetic could slow your reflexes.     Avoid strenuous activity for the first 24 hours. You may resume your regular activities after that.     You may shower, however avoid swimming, tub baths or hot tubs for 24 hours following your procedure    You may have a mild to moderate increase in pain for several days following the injection.      You may use ice packs for 10-15 minutes, 3 to 4 times a day at the injection site for comfort    Do not use heat to painful areas for 6 to 8 hours. This will give the local anesthetic time to wear off and prevent you from accidentally burning your skin.    Unless you have been directed to avoid the use of anti-inflammatory medications (NSAIDS-ibuprofen, Aleve, Motrin), you may use these medications or Tylenol for pain control if needed.     With diabetes, check your blood sugar more frequently than usual as your blood sugar may be higher than normal for 10-14 days following a steroid injection. Contact your doctor who manages your diabetes if your blood sugar is higher than usual    Possible side effects of steroids that you may experience include flushing, elevated blood pressure, increased appetite, mild headaches and restlessness.  All of these symptoms will get better with time.    It may take up to 14 days for the steroid medication to start working although you may feel the effect as early as a few days after the procedure.     Follow up with your referring provider in 2-3 weeks      If you experience any of the following,  call the pain center line during work hours at [phone] or on-call physician after hours at [phone]:  -Fever over 100 degree F  -Swelling, bleeding, redness, drainage, warmth at the injection site  -Progressive weakness or numbness in your legs  -Loss of bowel or bladder function  -Unusual headache that is not relieved by Tylenol or your regular headache medication  -Unusual new onset of pain that is not improving

## 2021-11-08 NOTE — NURSING NOTE
Pre-procedure Intake    If YES to any questions or NO to having a   Please complete laminated checklist and leave on the computer keyboard for Provider, verbally inform provider if able.    For SCS Trial, RFA's or any sedation procedure: NA    If yes, for how long?         Are you taking any any blood thinners such as Coumadin, Warfarin, Jantoven, Pradaxa Xarelto, Eliquis, Edoxaban, Enoxaparin, Lovenox, Heparin, Arixtra, Fondaparinux, or Fragmin? OR Antiplatelet medication such as Plavix, Brilinta, or Effient?  NO     If yes, when did you take your last dose?        Do you take aspirin?   NO    If cervical procedure, have you held aspirin for 6 days?   NA    Do you have any allergies to contrast dye, iodine, steroid and/or numbing medications?  NO     Are you currently taking antibiotics or have an active infection?  NO     Have you had a fever/elevated temperature within the past week? NO     Are you currently taking oral steroids? NO     Do you have a ? Yes     Are you pregnant or breastfeeding? NA     Have you received the COVID-19 vaccine? Yes     If yes, was it your 1st, 2nd or only dose needed? 2nd dose  5/20/21    Notify provider and RNs if systolic BP >170, diastolic BP >100, P >100 or O2 sats < 90%

## 2021-11-08 NOTE — NURSING NOTE
Discharge Information    IV Discontiued Time:  NA    Amount of Fluid Infused:  NA    Discharge Criteria = When patient returns to baseline or as per MD order    Consciousness:  Pt is fully awake    Circulation:  BP +/- 20% of pre-procedure level    Respiration:  Patient is able to breathe deeply    O2 Sat:  Patient is able to maintain O2 Sat >92% on room air    Activity:  Moves 4 extremities on command    Ambulation:  Patient is able to stand and walk or stand and pivot into wheelchair    Dressing:  Clean/dry or No Dressing    Notes:   Discharge instructions and AVS given to patient    Patient meets criteria for discharge?  YES    Admitted to PCU?  No    Responsible adult present to accompany patient home?  Yes    Signature/Title:    Lynda Ceja RN  RN Care Coordinator  [facility]

## 2021-11-08 NOTE — Clinical Note
Lumbar epidural steroid injection completed.    Amor Villarreal [facility]

## 2021-11-16 ENCOUNTER — LAB (OUTPATIENT)
Dept: LAB | Facility: CLINIC | Age: 46
End: 2021-11-16
Attending: INTERNAL MEDICINE
Payer: COMMERCIAL

## 2021-11-16 DIAGNOSIS — Z11.59 ENCOUNTER FOR SCREENING FOR OTHER VIRAL DISEASES: ICD-10-CM

## 2021-11-16 PROCEDURE — U0005 INFEC AGEN DETEC AMPLI PROBE: HCPCS

## 2021-11-16 PROCEDURE — U0003 INFECTIOUS AGENT DETECTION BY NUCLEIC ACID (DNA OR RNA); SEVERE ACUTE RESPIRATORY SYNDROME CORONAVIRUS 2 (SARS-COV-2) (CORONAVIRUS DISEASE [COVID-19]), AMPLIFIED PROBE TECHNIQUE, MAKING USE OF HIGH THROUGHPUT TECHNOLOGIES AS DESCRIBED BY CMS-2020-01-R: HCPCS

## 2021-11-17 LAB — SARS-COV-2 RNA RESP QL NAA+PROBE: NEGATIVE

## 2021-11-19 ENCOUNTER — HOSPITAL ENCOUNTER (OUTPATIENT)
Facility: CLINIC | Age: 46
Discharge: HOME | End: 2021-11-19
Attending: INTERNAL MEDICINE | Admitting: INTERNAL MEDICINE
Payer: COMMERCIAL

## 2021-11-19 VITALS
HEIGHT: 74 IN | TEMPERATURE: 98.4 F | DIASTOLIC BLOOD PRESSURE: 96 MMHG | BODY MASS INDEX: 30.8 KG/M2 | HEART RATE: 80 BPM | OXYGEN SATURATION: 95 % | WEIGHT: 240 LBS | RESPIRATION RATE: 16 BRPM | SYSTOLIC BLOOD PRESSURE: 120 MMHG

## 2021-11-19 LAB — COLONOSCOPY: NORMAL

## 2021-11-19 PROCEDURE — 250N000011 HC RX IP 250 OP 636: Performed by: INTERNAL MEDICINE

## 2021-11-19 PROCEDURE — 88305 TISSUE EXAM BY PATHOLOGIST: CPT | Mod: TC | Performed by: INTERNAL MEDICINE

## 2021-11-19 PROCEDURE — 45385 COLONOSCOPY W/LESION REMOVAL: CPT | Performed by: INTERNAL MEDICINE

## 2021-11-19 PROCEDURE — G0500 MOD SEDAT ENDO SERVICE >5YRS: HCPCS | Performed by: INTERNAL MEDICINE

## 2021-11-19 RX ORDER — SIMETHICONE 40MG/0.6ML
133 SUSPENSION, DROPS(FINAL DOSAGE FORM)(ML) ORAL
Status: DISCONTINUED | OUTPATIENT
Start: 2021-11-19 | End: 2021-11-19 | Stop reason: HOSPADM

## 2021-11-19 RX ORDER — LIDOCAINE 40 MG/G
CREAM TOPICAL
Status: DISCONTINUED | OUTPATIENT
Start: 2021-11-19 | End: 2021-11-19 | Stop reason: HOSPADM

## 2021-11-19 RX ORDER — FLUMAZENIL 0.1 MG/ML
0.2 INJECTION, SOLUTION INTRAVENOUS
Status: DISCONTINUED | OUTPATIENT
Start: 2021-11-19 | End: 2021-11-19 | Stop reason: HOSPADM

## 2021-11-19 RX ORDER — ONDANSETRON 2 MG/ML
4 INJECTION INTRAMUSCULAR; INTRAVENOUS
Status: DISCONTINUED | OUTPATIENT
Start: 2021-11-19 | End: 2021-11-19 | Stop reason: HOSPADM

## 2021-11-19 RX ORDER — EPINEPHRINE 1 MG/ML
0.1 INJECTION, SOLUTION INTRAMUSCULAR; SUBCUTANEOUS
Status: DISCONTINUED | OUTPATIENT
Start: 2021-11-19 | End: 2021-11-19 | Stop reason: HOSPADM

## 2021-11-19 RX ORDER — FENTANYL CITRATE 50 UG/ML
25-50 INJECTION, SOLUTION INTRAMUSCULAR; INTRAVENOUS
Status: DISCONTINUED | OUTPATIENT
Start: 2021-11-19 | End: 2021-11-19 | Stop reason: HOSPADM

## 2021-11-19 RX ORDER — FENTANYL CITRATE 50 UG/ML
50-100 INJECTION, SOLUTION INTRAMUSCULAR; INTRAVENOUS
Status: COMPLETED | OUTPATIENT
Start: 2021-11-19 | End: 2021-11-19

## 2021-11-19 RX ORDER — ATROPINE SULFATE 0.4 MG/ML
0.4 AMPUL (ML) INJECTION
Status: DISCONTINUED | OUTPATIENT
Start: 2021-11-19 | End: 2021-11-19 | Stop reason: HOSPADM

## 2021-11-19 RX ADMIN — MIDAZOLAM 2 MG: 1 INJECTION INTRAMUSCULAR; INTRAVENOUS at 10:45

## 2021-11-19 RX ADMIN — FENTANYL CITRATE 100 MCG: 50 INJECTION INTRAMUSCULAR; INTRAVENOUS at 10:45

## 2021-11-19 ASSESSMENT — MIFFLIN-ST. JEOR: SCORE: 2043.38

## 2021-11-19 NOTE — LETTER
November 1, 2021      Paul Mckeon  [address]  [place] [address]        Dear Paul,     Please be aware that coverage of these services is subject to the terms and limitations of your health insurance plan.  Call member services at your health plan with any benefit or coverage questions.    Thank you for choosing [facility]. You are scheduled for the following service(s):    Date:  Friday November 19, 2021            Procedure:  COLONOSCOPY  Doctor:        Juan CONSTANTINO   Arrival Time:  09:45am *Enter and check in at the Main Hospital Entrance*  Procedure Time:  10:30am      Location:   [facility]        Endoscopy Department, First Floor         [address] [place], [place] [address]      [phone] or [phone] ([facility]) to reschedule      MIRALAX -GATORADE  PREP  Colonoscopy is the most accurate test to detect colon polyps and colon cancer; and the only test where polyps can be removed. During this procedure, a doctor examines the lining of your large intestine and rectum through a flexible tube.   Transportation  You must arrange for a ride for the day of your procedure with a responsible adult. A taxi , Uber, etc, is not an option unless you are accompanied by a responsible adult. If you fail to arrange transportation with a responsible adult, your procedure will be cancelled and rescheduled.    Purchase the  following supplies at your local pharmacy:  - 2 (two) bisacodyl tablets: each tablet contains 5 mg.  (Dulcolax  laxative NOT Dulcolax  stool softener)   - 1 (one) 8.3 oz bottle of Polyethylene Glycol (PEG) 3350 Powder   (MiraLAX , Smooth LAX , ClearLAX  or equivalent)  - 64 oz Gatorade    Regular Gatorade, Gatorade G2 , Powerade , Powerade Zero  or Pedialyte  is acceptable. Red colored flavors are not allowed; all other colors (yellow, green, orange, purple and blue) are okay. It is also okay to buy two 2.12 oz packets of  powdered Gatorade that can be mixed with water to a total volume of 64 oz of liquid.  - 1 (one) 10 oz bottle of Magnesium Citrate (Red colored flavors are not allowed)  It is also okay for you to use a 0.5 oz package of powdered magnesium citrate (17 g) mixed with 10 oz of water.      PREPARATION FOR COLONOSCOPY    7 days before:    Discontinue fiber supplements and medications containing iron. This includes Metamucil  and Fibercon ; and multivitamins with iron.    3 days before:    Begin a low-fiber diet. A low-fiber diet helps making the cleanout more effective.     Examples of a low-fiber diet include (but are not limited to): white bread, white rice, pasta, crackers, fish, chicken, eggs, ground beef, creamy peanut butter, cooked/steamed/boiled vegetables, canned fruit, bananas, melons, milk, plain yogurt cheese, salad dressing and other condiments.     The following are not allowed on a low-fiber diet: seeds, nuts, popcorn, bran, whole wheat, corn, quinoa, raw fruits and vegetables, berries and dried fruit, beans and lentils.    For additional details on low-fiber diet, please refer to the table on the last page.    2 days before:    Continue the low-fiber diet.     Drink at least 8 glasses of water throughout the day.     Stop eating solid foods at 11:45 pm.    1 day before:    In the morning: begin a clear liquid diet (liquids you can see through).     Examples of a clear liquid diet include: water, clear broth or bouillon, Gatorade, Pedialyte or Powerade, carbonated and non-carbonated soft drinks (Sprite , 7-Up , ginger ale), strained fruit juices without pulp (apple, white grape, white cranberry), Jell-O  and popsicles.     The following are not allowed on a clear liquid diet: red liquids, alcoholic beverages, dairy products (milk, creamer, and yogurt), protein shakes, creamy broths, juice with pulp and chewing tobacco.    At noon: take 2 (two) bisacodyl tablets     At 4 (and no later than 6pm): start  drinking the Miralax-Gatorade preparation (8.3 oz of Miralax mixed with 64 oz of Gatorade in a large pitcher). Drink 1(one) 8 oz glass every 15 minutes thereafter, until the mixture is gone.    COLON CLEANSING TIPS: drink adequate amounts of fluids before and after your colon cleansing to prevent dehydration. Stay near a toilet because you will have diarrhea. Even if you are sitting on the toilet, continue to drink the cleansing solution every 15 minutes. If you feel nauseous or vomit, rinse your mouth with water, take a 15 to 30-minute-break and then continue drinking the solution. You will be uncomfortable until the stool has flushed from your colon (in about 2 to 4 hours). You may feel chilled.    Day of your procedure  You may take all of your morning medications including blood pressure medications, blood thinners (if you have not been instructed to stop these by our office), methadone, anti-seizure medications with sips of water 3 hours prior to your procedure or earlier. Do not take insulin or vitamins prior to your procedure. Continue the clear liquid diet.       4 hours prior: drink 10 oz of magnesium citrate. It may be easier to drink it with a straw.    STOP consuming all liquids after that.     Do not take anything by mouth during this time.     Allow extra time to travel to your procedure as you may need to stop and use a restroom along the way.    You are ready for the procedure, if you followed all instructions and your stool is no longer formed, but clear or yellow liquid. If you are unsure whether your colon is clean, please call our office at [phone] before you leave for your appointment.    Bring the following to your procedure:  - Insurance Card/Photo ID.   - List of current medications including over-the-counter medications and supplements.   - Your rescue inhaler if you currently use one to control asthma.    Canceling or rescheduling your appointment:   If you must cancel or reschedule  your appointment, please call [phone] as soon as possible.      COLONOSCOPY PRE-PROCEDURE CHECKLIST    If you have diabetes, ask your regular doctor for diet and medication restrictions.  If you take an anticoagulant or anti-platelet medication (such as Coumadin , Lovenox , Pradaxa , Xarelto , Eliquis , etc.), please call your primary doctor for advice on holding this medication.  If you take aspirin you may continue to do so.  If you are or may be pregnant, please discuss the risks and benefits of this procedure with your doctor.        What happens during a colonoscopy?    Plan to spend up to two hours, starting at registration time, at the endoscopy center the day of your procedure. The colonoscopy takes an average of 15 to 30 minutes. Recovery time is about 30 minutes.      Before the exam:    You will change into a gown.    Your medical history and medication list will be reviewed with you, unless that has been done over the phone prior to the procedure.     A nurse will insert an intravenous (IV) line into your hand or arm.    The doctor will meet with you and will give you a consent form to sign.  During the exam:     Medicine will be given through the IV line to help you relax.     Your heart rate and oxygen levels will be monitored. If your blood pressure is low, you may be given fluids through the IV line.     The doctor will insert a flexible hollow tube, called a colonoscope, into your rectum. The scope will be advanced slowly through the large intestine (colon).    You may have a feeling of fullness or pressure.     If an abnormal tissue or a polyp is found, the doctor may remove it through the endoscope for closer examination, or biopsy. Tissue removal is painless    After the exam:           Any tissue samples removed during the exam will be sent to a lab for evaluation. It may take 5-7 working days for you to be notified of the results.     A nurse will provide you with complete discharge  instructions before you leave the endoscopy center. Be sure to ask the nurse for specific instructions if you take blood thinners such as Aspirin, Coumadin or Plavix.     The doctor will prepare a full report for you and for the physician who referred you for the procedure.     Your doctor will talk with you about the initial results of your exam.      Medication given during the exam will prohibit you from driving for the rest of the day.     Following the exam, you may resume your normal diet. Your first meal should be light, no greasy foods. Avoid alcohol until the next day.     You may resume your regular activities the day after the procedure.         LOW-FIBER DIET    Foods RECOMMENDED Foods to AVOID   Breads, Cereal, Rice and Pasta:   White bread, rolls, biscuits, croissant and rosetta toast.   Waffles, Armenian toast and pancakes.   White rice, noodles, pasta, macaroni and peeled cooked potatoes.   Plain crackers and saltines.   Cooked cereals: farina, cream of rice.   Cold cereals: Puffed Rice , Rice Krispies , Corn Flakes  and Special K    Breads, Cereal, Rice and Pasta:   Breads or rolls with nuts, seeds or fruit.   Whole wheat, pumpernickel, rye breads and cornbread.   Potatoes with skin, brown or wild rice, and kasha (buckwheat).     Vegetables:   Tender cooked and canned vegetables without seeds: carrots, asparagus tips, green or wax beans, pumpkin, spinach, lima beans. Vegetables:   Raw or steamed vegetables.   Vegetables with seeds.   Sauerkraut.   Winter squash, peas, broccoli, Brussel sprouts, cabbage, onions, cauliflower, baked beans, peas and corn.   Fruits:   Strained fruit juice.   Canned fruit, except pineapple.   Ripe bananas and melon. Fruits:   Prunes and prune juice.   Raw fruits.   Dried fruits: figs, dates and raisins.   Milk/Dairy:   Milk: plain or flavored.   Yogurt, custard and ice cream.   Cheese and cottage cheese Milk/Dairy:     Meat and other proteins:   ground, well-cooked tender  beef, lamb, ham, veal, pork, fish, poultry and organ meats.   Eggs.   Peanut butter without nuts. Meat and other proteins:   Tough, fibrous meats with gristle.   Dry beans, peas and lentils.   Peanut butter with nuts.   Tofu.   Fats, Snack, Sweets, Condiments and Beverages:   Margarine, butter, oils, mayonnaise, sour cream and salad dressing, plain gravy.   Sugar, hard candy, clear jelly, honey and syrup.   Spices, cooked herbs, bouillon, broth and soups made with allowed vegetable, ketchup and mustard.   Coffee, tea and carbonated drinks.   Plain cakes, cookies and pretzels.   Gelatin, plain puddings, custard, ice cream, sherbet and popsicles. Fats, Snack, Sweets, Condiments and Beverages:   Nuts, seeds and coconut.   Jam, marmalade and preserves.   Pickles, olives, relish and horseradish.   All desserts containing nuts, seeds, dried fruit and coconut; or made from whole grains or bran.   Candy made with nuts or seeds.   Popcorn.         DIRECTIONS TO THE ENDOSCOPY DEPARTMENT    From the north ([facility])  Take [address] South, exit on [address] [address]. Get into the left hand tash, turn left (east), go one-half mile to [address] and turn left. Go north to the second stoplight, take a right on [address] and follow it to the Main Hospital entrance.    From the south ([facility])  Take [address]N to the [address] split and exit on [address] [address]. On [address] [address], turn left (west) to [address]. Turn right (north) on [address]. Go north to the second stoplight, take a right on [address] and follow it to the Main Hospital entrance.    From the east via 35E ([facility])  Take [address]E south to [address] [address] exit. Turn right on [address] [address]. Go west to [address]. Turn right (north) on [address]. Go to the second stoplight, take a right on [address] to the Main Hospital entrance.    From the east via [address] ([facility] [place])  Take [facility] [address]  West to [address]. Turn left (south) on [address] to [address], turn left (east) on [address] and follow it to the Main Hospital entrance.    From the west via [address] ([place])  Take [address] east to [address]. Turn right (south) on [address] to [address], turn left (east) on [address] and follow it to the Main Hospital entrance.               .

## 2021-11-19 NOTE — DISCHARGE INSTRUCTIONS
The patient has received a copy of the Provation  report the doctor has written and discharge instructions have been discussed with the patient and responsible adult.  All questions were addressed and answered prior to patient discharge.    Understanding Colon and Rectal Polyps     The colon has a smooth lining composed of millions of cells.     The colon (also called the large intestine) is a muscular tube that forms the last part of the digestive tract. It absorbs water and stores food waste. The colon is about 4 to 6 feet long. The rectum is the last 6 inches of the colon. The colon and rectum have a smooth lining composed of millions of cells. Changes in these cells can lead to growths in the colon that can become cancerous and should be removed.     When the Colon Lining Changes  Changes that occur in the cells that line the colon or rectum can lead to growths called polyps. Over a period of years, polyps can turn cancerous. Removing polyps early may prevent cancer from ever forming.      Polyps  Polyps are fleshy clumps of tissue that form on the lining of the colon or rectum. Small polyps are usually benign (not cancerous). However, over time, cells in a polyp can change and become cancerous. The larger a polyp grows, the more likely this is to happen. Also, certain types of polyps known as adenomatous polyps are considered premalignant. This means that they will almost always become cancerous if they re not removed.          Cancer  Almost all colorectal cancers start when polyp cells begin growing abnormally. As a cancerous tumor grows, it may involve more and more of the colon or rectum. In time, cancer can also grow beyond the colon or rectum and spread to nearby organs or to glands called lymph nodes. The cells can also travel to other parts of the body. This is known as metastasis. The earlier a cancerous tumor is removed, the better the chance of preventing its spread.        6505-3062 Srinivasa Cuello,  [address] [place] [address]. All rights reserved. This information is not intended as a substitute for professional medical care. Always follow your healthcare professional's instructions.    Eating a High-Fiber Diet  Fiber is what gives strength and structure to plants. Most grains, beans, vegetables, and fruits contain fiber. Foods rich in fiber are often low in calories and fat, and they fill you up more. They may also reduce your risks for certain health problems. To find out the amount of fiber in canned, packaged, or frozen foods, read the  Nutrition Facts  label. It tells you how much fiber is in a serving.      Types of Fiber and Their Benefits  There are two types of fiber: insoluble and soluble. They both aid digestion and help you maintain a healthy weight.  Insoluble fiber: This is found in whole grains, cereals, certain fruits and vegetables (such as apple skin, corn, and carrots). Insoluble fiber may prevent constipation and reduce the risk of certain types of cancer.   Soluble fiber: This type of fiber is in oats, beans, and certain fruits and vegetables (such as strawberries and peas). Soluble fiber can reduce cholesterol (which may help lower the risk of heart disease), and helps control blood sugar levels.  Look for High-Fiber Foods  Whole-grain breads and cereals: Try to eat 6-8 ounces a day. Include wheat and oat bran cereals, whole-wheat muffins or toast, and corn tortillas in your meals.  Fruits: Try to eat 2 cups a day. Apples, oranges, strawberries, pears, and bananas are good sources. (Note: Fruit juice is low in fiber.)  Vegetables: Try to eat 3 cups a day. Add asparagus, carrots, broccoli, peas, and corn to your meals.  Legumes (beans): One cup of cooked lentils gives you over 15 grams of fiber. Try navy beans, lentils, and chickpeas.  Seeds:  A small handful of seeds gives you about 3 grams of fiber. Try sunflower seeds.    Keep Track of Your Fiber  A healthy diet includes  31 grams of fiber a day if you have a 2,000-calorie diet. Keep track of how much fiber you eat. Start by reading food labels. Then eat a variety of foods high in fiber. Ask your doctor about supplemental fiber products.            6360-9581 Srinivasa Cuello, [address] [place] [address], [place] [address]. All rights reserved. This information is not intended as a substitute for professional medical care. Always follow your healthcare professional's instructions.

## 2021-11-19 NOTE — H&P
Pre-Endoscopy History and Physical     Paul Mckeon MRN# [id number]   YOB: 1975 Age: 46 year old     Date of Procedure: 11/19/2021  Primary care provider: Shiraz Gil  Type of Endoscopy: Colonoscopy with possible biopsy, possible polypectomy  Reason for Procedure: screen  Type of Anesthesia Anticipated: Conscious Sedation    HPI:    Paul is a 46 year old male who will be undergoing the above procedure.      A history and physical has been performed. The patient's medications and allergies have been reviewed. The risks and benefits of the procedure and the sedation options and risks were discussed with the patient.  All questions were answered and informed consent was obtained.      He denies a personal or family history of anesthesia complications or bleeding disorders.     Patient Active Problem List   Diagnosis     Elevated fasting glucose     Tobacco use disorder     Essential hypertension     Hyperlipidemia LDL goal <130        Past Medical History:   Diagnosis Date     Essential hypertension 4/28/2021        Past Surgical History:   Procedure Laterality Date     ARTHROSCOPIC RECONSTRUCTION ANTERIOR AND POSTERIOR CRUCIATE LIGAMENT, COMBINED       HAND SURGERY       TONSILLECTOMY         Social History     Tobacco Use     Smoking status: Former Smoker     Types: Cigarettes     Smokeless tobacco: Former User     Types: Chew     Tobacco comment: quit smoking in early 2000's and quit chew in 2013   Substance Use Topics     Alcohol use: Yes     Alcohol/week: 2.0 standard drinks     Types: 2 Standard drinks or equivalent per week       Family History   Problem Relation Age of Onset     Thyroid Disease Mother      Hypertension Mother      Obesity Mother      Respiratory Mother         EB     Skin Cancer Mother      Squamous cell carcinoma Mother      Heart Disease Father         Arrythmia     Respiratory Father         EB     Thyroid Disease Maternal Grandmother        Prior to Admission  "medications    Medication Sig Start Date End Date Taking? Authorizing Provider   acyclovir (ZOVIRAX) 400 MG tablet TAKE 1 TABLET BY MOUTH TWICE A DAY 4/28/21   Shiraz Gil MD   hydrochlorothiazide (HYDRODIURIL) 12.5 MG tablet Take 1 tablet (12.5 mg) by mouth daily 9/15/21   Shiraz Gil MD   Multiple Vitamins-Minerals (CENTRUM MEN PO)     Reported, Patient       No Known Allergies     REVIEW OF SYSTEMS:   5 point ROS negative except as noted above in HPI, including Gen., Resp., CV, GI &  system review.    PHYSICAL EXAM:   There were no vitals taken for this visit. Estimated body mass index is 31.67 kg/m  as calculated from the following:    Height as of 9/15/21: 1.892 m (6' 2.5\").    Weight as of 9/15/21: 113.4 kg (250 lb).   GENERAL APPEARANCE: alert, and oriented  MENTAL STATUS: alert  AIRWAY EXAM: Mallampatti Class I (visualization of the soft palate, fauces, uvula, anterior and posterior pillars)  RESP: lungs clear to auscultation - no rales, rhonchi or wheezes  CV: regular rates and rhythm  DIAGNOSTICS:    Not indicated    IMPRESSION   ASA Class 1 - Healthy patient, no medical problems    PLAN:   Plan for Colonoscopy with possible biopsy, possible polypectomy. We discussed the risks, benefits and alternatives and the patient wished to proceed.    The above has been forwarded to the consulting provider.      Signed Electronically by: Juan Oakes MD  November 19, 2021          "

## 2021-11-22 LAB
PATH REPORT.COMMENTS IMP SPEC: NORMAL
PATH REPORT.COMMENTS IMP SPEC: NORMAL
PATH REPORT.FINAL DX SPEC: NORMAL
PATH REPORT.GROSS SPEC: NORMAL
PATH REPORT.MICROSCOPIC SPEC OTHER STN: NORMAL
PATH REPORT.RELEVANT HX SPEC: NORMAL
PHOTO IMAGE: NORMAL

## 2021-11-22 PROCEDURE — 88305 TISSUE EXAM BY PATHOLOGIST: CPT | Mod: 26 | Performed by: PATHOLOGY

## 2021-11-27 NOTE — PROGRESS NOTES
SUBJECTIVE:   CC: Paul Mckeon is an 43 year old male who presents for preventative health visit.     Physical   Annual:     Getting at least 3 servings of Calcium per day:  Yes    Bi-annual eye exam:  Yes    Dental care twice a year:  Yes    Sleep apnea or symptoms of sleep apnea:  None    Diet:  Regular (no restrictions)    Frequency of exercise:  2-3 days/week    Duration of exercise:  Greater than 60 minutes    Taking medications regularly:  Yes    Medication side effects:  None    Additional concerns today:  No    PHQ-2 Total Score: 0    Today's PHQ-2 Score:   PHQ-2 ( 1999 Pfizer) 12/19/2018   Q1: Little interest or pleasure in doing things 0   Q2: Feeling down, depressed or hopeless 0   PHQ-2 Score 0   Q1: Little interest or pleasure in doing things Not at all   Q2: Feeling down, depressed or hopeless Not at all   PHQ-2 Score 0       Abuse: Current or Past(Physical, Sexual or Emotional)- No  Do you feel safe in your environment? Yes    Social History     Tobacco Use     Smoking status: Former Smoker     Types: Cigarettes     Smokeless tobacco: Former User     Types: Chew     Tobacco comment: quit smoking in early 2000's and quit chew in 2013   Substance Use Topics     Alcohol use: Yes     Alcohol/week: 1.2 oz     Types: 2 Standard drinks or equivalent per week     Alcohol Use 12/19/2018   If you drink alcohol do you typically have greater than 3 drinks per day OR greater than 7 drinks per week? No   No flowsheet data found.    Last PSA: No results found for: PSA    Reviewed orders with patient. Reviewed health maintenance and updated orders accordingly - Yes  Patient Active Problem List   Diagnosis     Elevated blood pressure     Elevated fasting glucose     Tobacco use disorder     Past Surgical History:   Procedure Laterality Date     ARTHROSCOPIC RECONSTRUCTION ANTERIOR AND POSTERIOR CRUCIATE LIGAMENT, COMBINED       HAND SURGERY       TONSILLECTOMY         Social History     Tobacco Use     Smoking  [Menstrual Problems] : reports normal menses status: Former Smoker     Types: Cigarettes     Smokeless tobacco: Former User     Types: Chew     Tobacco comment: quit smoking in early 2000's and quit chew in 2013   Substance Use Topics     Alcohol use: Yes     Alcohol/week: 1.2 oz     Types: 2 Standard drinks or equivalent per week     Family History   Problem Relation Age of Onset     Thyroid Disease Mother      Hypertension Mother      Obesity Mother      Respiratory Mother         EB     Heart Disease Father         Arrythmia     Respiratory Father         EB     Thyroid Disease Maternal Grandmother            Reviewed and updated as needed this visit by clinical staff  Tobacco  Allergies  Meds         Reviewed and updated as needed this visit by Provider        No past medical history on file.   Past Surgical History:   Procedure Laterality Date     ARTHROSCOPIC RECONSTRUCTION ANTERIOR AND POSTERIOR CRUCIATE LIGAMENT, COMBINED       HAND SURGERY       TONSILLECTOMY         Review of Systems   Constitutional: Negative for chills and fever.   HENT: Negative for congestion, ear pain, hearing loss and sore throat.    Eyes: Negative for pain and visual disturbance.   Respiratory: Negative for cough and shortness of breath.    Cardiovascular: Negative for chest pain, palpitations and peripheral edema.   Gastrointestinal: Positive for constipation and diarrhea. Negative for abdominal pain, heartburn, hematochezia and nausea.   Genitourinary: Negative for discharge, dysuria, frequency, genital sores, hematuria, impotence and urgency.   Musculoskeletal: Negative for arthralgias, joint swelling and myalgias.   Skin: Negative for rash.   Neurological: Negative for dizziness, weakness, headaches and paresthesias.   Psychiatric/Behavioral: Negative for mood changes. The patient is not nervous/anxious.      This document serves as a record of the services and decisions personally performed and made by Shiraz Gil MD. It was created on his behalf by Madelyn Velarde  "a trained medical scribe. The creation of this document is based on the provider's statements to the medical scribe.  Madelyn Velarde 2:52 PM December 19, 2018    OBJECTIVE:   /80 (BP Location: Right arm, Patient Position: Chair, Cuff Size: Adult Regular)   Pulse 83   Temp 98.9  F (37.2  C) (Oral)   Ht 1.88 m (6' 2\")   Wt 104.4 kg (230 lb 1.6 oz)   SpO2 97%   BMI 29.54 kg/m      Physical Exam  GENERAL: healthy, alert and no distress  EYES: Eyes grossly normal to inspection, PERRL and conjunctivae and sclerae normal  HENT: ear canals and TM's normal, nose and mouth without ulcers or lesions  NECK: no adenopathy, no asymmetry, masses, or scars and thyroid normal to palpation  RESP: lungs clear to auscultation - no rales, rhonchi or wheezes  CV: regular rate and rhythm, normal S1 S2, no S3 or S4, no murmur, click or rub, no peripheral edema and peripheral pulses strong  ABDOMEN: soft, nontender, no hepatosplenomegaly, no masses and bowel sounds normal   (male): normal male genitalia without lesions or urethral discharge, no hernia  MS: no gross musculoskeletal defects noted, no edema  SKIN: no suspicious lesions or rashes  NEURO: Normal strength and tone, mentation intact and speech normal  PSYCH: mentation appears normal, affect normal/bright    ASSESSMENT/PLAN:   1. Routine general medical examination at a health care facility    2. Recurrent cold sores  - acyclovir (ZOVIRAX) 400 MG tablet; TAKE 1 TABLET (400 MG) BY MOUTH 2 TIMES DAILY  Dispense: 180 tablet; Refill: 4    COUNSELING:   Reviewed preventive health counseling, as reflected in patient instructions    BP Readings from Last 1 Encounters:   12/19/18 132/80     Estimated body mass index is 29.54 kg/m  as calculated from the following:    Height as of this encounter: 1.88 m (6' 2\").    Weight as of this encounter: 104.4 kg (230 lb 1.6 oz).    BP Screening:   Last 3 BP Readings:    BP Readings from Last 3 Encounters:   12/19/18 132/80   11/13/17 " [Sexually Active] : is not sexually active 120/80   05/08/17 128/80       The following was recommended to the patient:  Re-screen BP within a year and recommended lifestyle modifications  Weight management plan: Discussed healthy diet and exercise guidelines     reports that he has quit smoking. His smoking use included cigarettes. He has quit using smokeless tobacco. His smokeless tobacco use included chew.      Counseling Resources:  ATP IV Guidelines  Pooled Cohorts Equation Calculator  FRAX Risk Assessment  ICSI Preventive Guidelines  Dietary Guidelines for Americans, 2010  USDA's MyPlate  ASA Prophylaxis  Lung CA Screening    The information in this document, created by the medical scribe for me, accurately reflects the services I personally performed and the decisions made by me. I have reviewed and approved this document for accuracy prior to leaving the patient care area.  December 19, 2018 3:17 PM    Shiraz Gil MD  [facility] Influenza Immunization Documentation    1.  Is the person to be vaccinated sick today?   No    2. Does the person to be vaccinated have an allergy to a component   of the vaccine?   No  Egg Allergy Algorithm Link    3. Has the person to be vaccinated ever had a serious reaction   to influenza vaccine in the past?   No    4. Has the person to be vaccinated ever had Guillain-Barré syndrome?   No    Form completed by Alannah Benton CMA

## 2021-11-29 ENCOUNTER — OFFICE VISIT (OUTPATIENT)
Dept: FAMILY MEDICINE | Facility: CLINIC | Age: 46
End: 2021-11-29
Payer: COMMERCIAL

## 2021-11-29 VITALS
HEART RATE: 71 BPM | BODY MASS INDEX: 31.85 KG/M2 | RESPIRATION RATE: 18 BRPM | WEIGHT: 248.2 LBS | TEMPERATURE: 98.4 F | OXYGEN SATURATION: 98 % | DIASTOLIC BLOOD PRESSURE: 82 MMHG | HEIGHT: 74 IN | SYSTOLIC BLOOD PRESSURE: 126 MMHG

## 2021-11-29 DIAGNOSIS — M47.816 SPONDYLOSIS OF LUMBAR REGION WITHOUT MYELOPATHY OR RADICULOPATHY: Primary | ICD-10-CM

## 2021-11-29 DIAGNOSIS — L03.119 CELLULITIS OF UPPER EXTREMITY, UNSPECIFIED LATERALITY: ICD-10-CM

## 2021-11-29 PROCEDURE — 99213 OFFICE O/P EST LOW 20 MIN: CPT | Performed by: PHYSICIAN ASSISTANT

## 2021-11-29 RX ORDER — CEPHALEXIN 500 MG/1
500 CAPSULE ORAL 3 TIMES DAILY
Qty: 21 CAPSULE | Refills: 0 | Status: SHIPPED | OUTPATIENT
Start: 2021-11-29 | End: 2021-12-06

## 2021-11-29 ASSESSMENT — MIFFLIN-ST. JEOR: SCORE: 2080.58

## 2021-11-29 NOTE — PROGRESS NOTES
"  Assessment & Plan     Spondylosis of lumbar region without myelopathy or radiculopathy  Doing  much better after injection, no abnormalities found on exam.    Cellulitis of upper extremity, unspecified laterality  Also try hydrocortisone for itch.  - cephALEXin (KEFLEX) 500 MG capsule; Take 1 capsule (500 mg) by mouth 3 times daily for 7 days        Return in about 3 months (around 2/28/2022) for Recheck if not improving.    Priscilla Gonzalez PA-C  [facility]    Pura Cedeno is a 46 year old who presents for the following health issues     History of Present Illness       Back Pain:  He presents for follow up of back pain. Patient's back pain is a chronic problem.  Location of back pain:  Right lower back  Description of back pain: cramping and dull ache  Back pain spreads: right buttocks and right thigh    Since patient first noticed back pain, pain is: always present, but gets better and worse  Does back pain interfere with his job:  No      He eats 2-3 servings of fruits and vegetables daily.He consumes 1 sweetened beverage(s) daily.He exercises with enough effort to increase his heart rate 20 to 29 minutes per day.  He exercises with enough effort to increase his heart rate 3 or less days per week.   He is taking medications regularly.       Follow up from injection: Patient has been feeling much better since. Able to stand, sit and workout without pain.     Additional complaints: None  HPI additional notes: Wilian presents today with   Chief Complaint   Patient presents with     RECHECK            Review of Systems   Constitutional, HEENT, cardiovascular, pulmonary, gi and gu systems are negative, except as otherwise noted.      Objective    /82 (BP Location: Right arm, Patient Position: Sitting, Cuff Size: Adult Large)   Pulse 71   Temp 98.4  F (36.9  C) (Oral)   Resp 18   Ht 1.88 m (6' 2\")   Wt 112.6 kg (248 lb 3.2 oz)   SpO2 98%   BMI 31.87 kg/m    Body mass " index is 31.87 kg/m .  Physical Exam     Physical Exam   GENERAL: healthy, alert, in no acute distress  HENT: Mucous mebranes moist.  MS: no gross deformities noted.  No CVA tenderness. No paralumbar tenderness.  SKIN: erythema with warmth and swelling on both olecranon processes.  PSYCH: Alert and oriented times 3;  Able to articulate logical thoughts. Affect is normal.

## 2022-03-13 ENCOUNTER — HEALTH MAINTENANCE LETTER (OUTPATIENT)
Age: 47
End: 2022-03-13

## 2022-04-06 DIAGNOSIS — I10 ESSENTIAL HYPERTENSION: ICD-10-CM

## 2022-04-06 RX ORDER — HYDROCHLOROTHIAZIDE 12.5 MG/1
12.5 TABLET ORAL DAILY
Qty: 90 TABLET | Refills: 3 | Status: CANCELLED | OUTPATIENT
Start: 2022-04-06

## 2022-04-06 NOTE — TELEPHONE ENCOUNTER
Reason for Call:  Medication or medication refill: hydrochlorothiazide     Do you use a [facility]?  Name of the pharmacy and phone number for the current request:  [facility] [place] - [address] [place] [phone]    Name of the medication requested: hydrochlorothiazide     Other request: n/a    Can we leave a detailed message on this number? YES    Phone number patient can be reached at: Home number on file [phone] (home)    Best Time: anytime    Call taken on 4/6/2022 at 12:33 PM by Krista Walls

## 2022-08-25 DIAGNOSIS — B00.1 RECURRENT COLD SORES: ICD-10-CM

## 2022-08-25 RX ORDER — ACYCLOVIR 400 MG/1
TABLET ORAL
Qty: 180 TABLET | Refills: 0 | Status: SHIPPED | OUTPATIENT
Start: 2022-08-25 | End: 2022-11-25

## 2022-08-25 NOTE — TELEPHONE ENCOUNTER
Routing refill request to provider for review/approval because:  A break in medication    Мария Smith RN

## 2022-10-03 DIAGNOSIS — I10 ESSENTIAL HYPERTENSION: ICD-10-CM

## 2022-10-04 RX ORDER — HYDROCHLOROTHIAZIDE 12.5 MG/1
12.5 TABLET ORAL DAILY
Qty: 90 TABLET | Refills: 0 | Status: SHIPPED | OUTPATIENT
Start: 2022-10-04 | End: 2022-11-28

## 2022-10-05 NOTE — TELEPHONE ENCOUNTER
Delonte message sent, needs visit to establish with new provider  Marisol Hardin,     
Left message to call back, letter mailed  Marisol Hardin,     
Reason for Call:  Medication or medication refill:    Do you use a [facility]?  Name of the [facility] and phone number [facility]     Name of the medication requested: hydrochlorothiazide (HYDRODIURIL) 12.5 MG tablet    Other request: call pt if needed     Can we leave a detailed message on this number? YES    Phone number patient can be reached at: Cell number on file:    Telephone Information:   Mobile [phone]       Best Time: any    Call taken on 10/3/2022 at 1:09 PM by Catherine Suggs      
Robina refill. Pt needs appt to est care with new provider prior to additional refills, pt also due for labs.     Will route to MA-TC to assist with scheduling.     Harriet MOTT RN    
28-Nov-2017 14:55

## 2022-10-10 ENCOUNTER — OFFICE VISIT (OUTPATIENT)
Dept: DERMATOLOGY | Facility: CLINIC | Age: 47
End: 2022-10-10
Payer: COMMERCIAL

## 2022-10-10 DIAGNOSIS — L81.4 LENTIGINES: ICD-10-CM

## 2022-10-10 DIAGNOSIS — L57.8 ACTINIC SKIN DAMAGE: ICD-10-CM

## 2022-10-10 DIAGNOSIS — L82.1 SEBORRHEIC KERATOSES: ICD-10-CM

## 2022-10-10 DIAGNOSIS — D48.5 NEOPLASM OF UNCERTAIN BEHAVIOR OF SKIN: Primary | ICD-10-CM

## 2022-10-10 PROCEDURE — 88305 TISSUE EXAM BY PATHOLOGIST: CPT | Performed by: PATHOLOGY

## 2022-10-10 PROCEDURE — 99213 OFFICE O/P EST LOW 20 MIN: CPT | Mod: 25 | Performed by: STUDENT IN AN ORGANIZED HEALTH CARE EDUCATION/TRAINING PROGRAM

## 2022-10-10 PROCEDURE — 11102 TANGNTL BX SKIN SINGLE LES: CPT | Performed by: STUDENT IN AN ORGANIZED HEALTH CARE EDUCATION/TRAINING PROGRAM

## 2022-10-10 NOTE — PATIENT INSTRUCTIONS
Wound Care Instructions     FOR SUPERFICIAL WOUNDS     [facility] [phone]                 AFTER 24 HOURS YOU SHOULD REMOVE THE BANDAGE AND BEGIN DAILY DRESSING CHANGES AS FOLLOWS:     1) Remove Dressing.     2) Clean and dry the area with tap water using a Q-tip or sterile gauze pad.     3) Apply Vaseline, Aquaphor, Polysporin ointment or Bacitracin ointment over entire wound.  Do NOT use Neosporin ointment.     4) Cover the wound with a band-aid, or a sterile non-stick gauze pad and micropore paper tape    REPEAT THESE INSTRUCTIONS AT LEAST ONCE A DAY UNTIL THE WOUND HAS COMPLETELY HEALED.    It is an old wives tale that a wound heals better when it is exposed to air and allowed to dry out. The wound will heal faster with a better cosmetic result if it is kept moist with ointment and covered with a bandage.    **Do not let the wound dry out.**    Supplies Needed:      *Cotton tipped applicators (Q-tips)    *Vaseline, Aquaphor, Polysporin or Bacitracin Ointment (NOT NEOSPORIN)    *Band-aids or non-stick gauze pads and micropore paper tape.      PATIENT INFORMATION:    During the healing process you will notice a number of changes. All wounds develop a small halo of redness surrounding the wound.  This means healing is occurring. Severe itching with extensive redness usually indicates sensitivity to the ointment or bandage tape used to dress the wound.  You should call our office if this develops.      Swelling  and/or discoloration around your surgical site is common, particularly when performed around the eye.    All wounds normally drain.  The larger the wound the more drainage there will be.  After 7-10 days, you will notice the wound beginning to shrink and new skin will begin to grow.  The wound is healed when you can see skin has formed over the entire area.  A healed wound has a healthy, shiny look to the surface and is red to dark pink in color to normalize.  Wounds may take approximately  4-6 weeks to heal.  Larger wounds may take 6-8 weeks.  After the wound is healed you may discontinue dressing changes.    You may experience a sensation of tightness as your wound heals. This is normal and will gradually subside.    Your healed wound may be sensitive to temperature changes. This sensitivity improves with time, but if you re having a lot of discomfort, try to avoid temperature extremes.    Patients frequently experience itching after their wound appears to have healed because of the continue healing under the skin.  Plain Vaseline will help relieve the itching.      POSSIBLE COMPLICATIONS    BLEEDING:    Leave the bandage in place.  Use tightly rolled up gauze or a cloth to apply direct pressure over the bandage for 30  minutes.  Reapply pressure for an additional 30 minutes if necessary  Use additional gauze and tape to maintain pressure once the bleeding has stopped.

## 2022-10-10 NOTE — PROGRESS NOTES
[facility] Dermatology Note    Encounter Date: Oct 10, 2022    Dermatology Problem List:  - nevus llipomatosus lower back     Major PMHx  -   ______________________________________    Impression/Plan:  Paul was seen today for derm problem.    Diagnoses and all orders for this visit:    Neoplasm of uncertain behavior of skin  -     SHAVE 1 [[id number]]  -     Dermatological Path Order and Indications; Standing  -     Dermatological Path Order and Indications  - shave bx lower back ddx NF vs NL    Lentigines  - benign    Actinic skin damage  - discussed sunprotective behaviors, clothing, and the use of sunscreen    Seborrheic keratoses  - benign    - SHAVE BIOPSY PROCEDURE NOTE: After written informed consent was obtained, a time out was taken to identify the patient and the correct site for biopsy. The lesion on the lower back was cleansed with a 70% isopropyl alcohol wipe, and then injected with 2cc of lidocaine 1% with epinephrine 1:100,000. Once anesthesia was ensured, the visible surface of the lesion was biopsied using a [place] blade in standard technique. Hemostasis was obtained with pressure and aluminum chloride 20% solution. The specimen was placed in a labeled formalin container and sent to pathology for sectioning and analysis. The wound was dressed with white petrolatum and an adhesive bandage. The patient tolerated the procedure well. Post-procedure instructions and recommendations were provided both verbally and in writing.        Follow-up in 1 year.       Staff Involved:  Staff Only    Jose J Cavazos MD   of Dermatology  Department of Dermatology  [facility]      CC:   Chief Complaint   Patient presents with     Derm Problem     [facility]        History of Present Illness:  Mr. Paul Mckeon is a 47 year old male who presents as a return patient.    Bump lower back he'd like examined  Brown spots on back and arms hed like  examined    Labs:      Physical exam:  Vitals: There were no vitals taken for this visit.  GEN: well developed, well-nourished, in no acute distress, in a pleasant mood.     SKIN: Tong phototype 2  - Full skin, which includes the head/face, both arms, chest, back, abdomen,both legs, genitalia and/or groin buttocks, digits and/or nails, was examined.  - Stuck on brown papules on trunk and extremities   - Flat brown macules and patches in a sun exposed areas on face and extremities  - 1cm soft papule lower back   - No other lesions of concern on areas examined.     Past Medical History:   Past Medical History:   Diagnosis Date     Essential hypertension 04/28/2021     Past Surgical History:   Procedure Laterality Date     ARTHROSCOPIC RECONSTRUCTION ANTERIOR AND POSTERIOR CRUCIATE LIGAMENT, COMBINED       COLONOSCOPY N/A 11/19/2021    Procedure: COLONOSCOPY, with polypectomies using exacto cold snare;  Surgeon: Juan Oakes MD;  Location:  GI     HAND SURGERY       TONSILLECTOMY         Social History:   reports that he has quit smoking. His smoking use included cigarettes. He has quit using smokeless tobacco.  His smokeless tobacco use included chew. He reports current alcohol use of about 2.0 standard drinks per week. He reports that he does not use drugs.    Family History:  Family History   Problem Relation Age of Onset     Thyroid Disease Mother      Hypertension Mother      Obesity Mother      Respiratory Mother         EB     Skin Cancer Mother      Squamous cell carcinoma Mother      Heart Disease Father         Arrythmia     Respiratory Father         EB     Thyroid Disease Maternal Grandmother      Colon Cancer Maternal Grandfather        Medications:  Current Outpatient Medications   Medication Sig Dispense Refill     acyclovir (ZOVIRAX) 400 MG tablet TAKE 1 TABLET BY MOUTH TWICE A  tablet 0     hydrochlorothiazide (HYDRODIURIL) 12.5 MG tablet Take 1 tablet (12.5 mg) by mouth daily  90 tablet 0     Multiple Vitamins-Minerals (CENTRUM MEN PO)        No Known Allergies

## 2022-10-10 NOTE — LETTER
10/10/2022         RE: Paul Mckeon  [address]  [place] [id number]        Dear Colleague,    Thank you for referring your patient, Paul Mckeon, to the [facility]. Please see a copy of my visit note below.    [facility] Dermatology Note    Encounter Date: Oct 10, 2022    Dermatology Problem List:  - nevus llipomatosus lower back     Major PMHx  -   ______________________________________    Impression/Plan:  Paul was seen today for derm problem.    Diagnoses and all orders for this visit:    Neoplasm of uncertain behavior of skin  -     SHAVE 1 [[id number]]  -     Dermatological Path Order and Indications; Standing  -     Dermatological Path Order and Indications  - shave bx lower back ddx NF vs NL    Lentigines  - benign    Actinic skin damage  - discussed sunprotective behaviors, clothing, and the use of sunscreen    Seborrheic keratoses  - benign    - SHAVE BIOPSY PROCEDURE NOTE: After written informed consent was obtained, a time out was taken to identify the patient and the correct site for biopsy. The lesion on the lower back was cleansed with a 70% isopropyl alcohol wipe, and then injected with 2cc of lidocaine 1% with epinephrine 1:100,000. Once anesthesia was ensured, the visible surface of the lesion was biopsied using a [place] blade in standard technique. Hemostasis was obtained with pressure and aluminum chloride 20% solution. The specimen was placed in a labeled formalin container and sent to pathology for sectioning and analysis. The wound was dressed with white petrolatum and an adhesive bandage. The patient tolerated the procedure well. Post-procedure instructions and recommendations were provided both verbally and in writing.        Follow-up in 1 year.       Staff Involved:  Staff Only    Jose J Cavazos MD   of Dermatology  Department of Dermatology  [facility]      CC:    Chief Complaint   Patient presents with     Derm Problem     [facility]        History of Present Illness:  Mr. Paul Mckeon is a 47 year old male who presents as a return patient.    Bump lower back he'd like examined  Brown spots on back and arms hed like examined    Labs:      Physical exam:  Vitals: There were no vitals taken for this visit.  GEN: well developed, well-nourished, in no acute distress, in a pleasant mood.     SKIN: Tong phototype 2  - Full skin, which includes the head/face, both arms, chest, back, abdomen,both legs, genitalia and/or groin buttocks, digits and/or nails, was examined.  - Stuck on brown papules on trunk and extremities   - Flat brown macules and patches in a sun exposed areas on face and extremities  - 1cm soft papule lower back   - No other lesions of concern on areas examined.     Past Medical History:   Past Medical History:   Diagnosis Date     Essential hypertension 04/28/2021     Past Surgical History:   Procedure Laterality Date     ARTHROSCOPIC RECONSTRUCTION ANTERIOR AND POSTERIOR CRUCIATE LIGAMENT, COMBINED       COLONOSCOPY N/A 11/19/2021    Procedure: COLONOSCOPY, with polypectomies using exacto cold snare;  Surgeon: Juan Oakes MD;  Location:  GI     HAND SURGERY       TONSILLECTOMY         Social History:   reports that he has quit smoking. His smoking use included cigarettes. He has quit using smokeless tobacco.  His smokeless tobacco use included chew. He reports current alcohol use of about 2.0 standard drinks per week. He reports that he does not use drugs.    Family History:  Family History   Problem Relation Age of Onset     Thyroid Disease Mother      Hypertension Mother      Obesity Mother      Respiratory Mother         EB     Skin Cancer Mother      Squamous cell carcinoma Mother      Heart Disease Father         Arrythmia     Respiratory Father         EB     Thyroid Disease Maternal Grandmother      Colon Cancer Maternal Grandfather         Medications:  Current Outpatient Medications   Medication Sig Dispense Refill     acyclovir (ZOVIRAX) 400 MG tablet TAKE 1 TABLET BY MOUTH TWICE A  tablet 0     hydrochlorothiazide (HYDRODIURIL) 12.5 MG tablet Take 1 tablet (12.5 mg) by mouth daily 90 tablet 0     Multiple Vitamins-Minerals (CENTRUM MEN PO)        No Known Allergies                Again, thank you for allowing me to participate in the care of your patient.        Sincerely,        Jose J Cavazos MD

## 2022-10-12 LAB
PATH REPORT.COMMENTS IMP SPEC: NORMAL
PATH REPORT.COMMENTS IMP SPEC: NORMAL
PATH REPORT.FINAL DX SPEC: NORMAL
PATH REPORT.GROSS SPEC: NORMAL
PATH REPORT.MICROSCOPIC SPEC OTHER STN: NORMAL
PATH REPORT.RELEVANT HX SPEC: NORMAL

## 2022-11-25 DIAGNOSIS — B00.1 RECURRENT COLD SORES: ICD-10-CM

## 2022-11-25 RX ORDER — ACYCLOVIR 400 MG/1
400 TABLET ORAL 2 TIMES DAILY
Qty: 60 TABLET | Refills: 0 | Status: SHIPPED | OUTPATIENT
Start: 2022-11-25 | End: 2022-11-28

## 2022-11-25 NOTE — TELEPHONE ENCOUNTER
Routing refill request to provider for review/approval because:  Pt has received 1 crissy refill with no follow up planned.  Labs out of range:   Pt needs to be seen to est care with new provider   Creatinine   Date Value Ref Range Status   09/15/2021 1.04 0.66 - 1.25 mg/dL Final   12/20/2019 1.16 0.66 - 1.25 mg/dL Final     Team please call to schedule est care with new provider.    Мария BERRY RN

## 2022-11-28 ENCOUNTER — VIRTUAL VISIT (OUTPATIENT)
Dept: FAMILY MEDICINE | Facility: OTHER | Age: 47
End: 2022-11-28
Payer: COMMERCIAL

## 2022-11-28 DIAGNOSIS — B00.1 RECURRENT COLD SORES: ICD-10-CM

## 2022-11-28 DIAGNOSIS — I10 ESSENTIAL HYPERTENSION: ICD-10-CM

## 2022-11-28 PROCEDURE — 99214 OFFICE O/P EST MOD 30 MIN: CPT | Mod: 95 | Performed by: FAMILY MEDICINE

## 2022-11-28 RX ORDER — ACYCLOVIR 400 MG/1
400 TABLET ORAL 2 TIMES DAILY
Qty: 180 TABLET | Refills: 0 | Status: SHIPPED | OUTPATIENT
Start: 2022-11-28 | End: 2023-05-18

## 2022-11-28 RX ORDER — HYDROCHLOROTHIAZIDE 12.5 MG/1
12.5 TABLET ORAL DAILY
Qty: 90 TABLET | Refills: 0 | Status: SHIPPED | OUTPATIENT
Start: 2022-11-28 | End: 2023-05-18

## 2022-11-28 NOTE — PROGRESS NOTES
"Wilian is a 47 year old who is being evaluated via a billable video visit.      How would you like to obtain your AVS? MyChart  If the video visit is dropped, the invitation should be resent by: Text to cell phone: [phone] - please send text for visit.  Will anyone else be joining your video visit? No          Assessment & Plan       ICD-10-CM    1. Essential hypertension  I10 hydrochlorothiazide (HYDRODIURIL) 12.5 MG tablet     Basic metabolic panel  (Ca, Cl, CO2, Creat, Gluc, K, Na, BUN)      2. Recurrent cold sores  B00.1 acyclovir (ZOVIRAX) 400 MG tablet         1.  Suspect adequate clinical control.  We will refill for 3 months, but stressed that he needs to get his blood pressure rechecked and lab monitoring completed during that timeframe.  If it is more convenient for him, he can simply get a physical done in that timeframe.  2.  Clinically doing well.  We will continue current regimen.  Follow-up with new PCP.    Portions of this note were completed using Dragon dictation software.  Although reviewed, there may be typographical and other inadvertent errors that remain.         Ordering of each unique test  Prescription drug management         BMI:   Estimated body mass index is 31.87 kg/m  as calculated from the following:    Height as of 11/29/21: 1.88 m (6' 2\").    Weight as of 11/29/21: 112.6 kg (248 lb 3.2 oz).   Weight management plan: Discussed healthy diet and exercise guidelines    Patient Instructions   Thank you for visiting [facility]    Please get follow-up labs and blood pressure within the next month or 2.    If it is more convenient, you could just schedule a physical with your regular clinic.    Try to increase your fiber intake to help with your constipation.  If unable to wean off of the MiraLAX, that is probably reasonable, but increased fiber intake would be more physiologic.    Contact us or return if questions or concerns.     Have a nice day!    Dr. Rizzo "     Return in about 3 months (around 2/28/2023) for Physical Exam.      If you need medication refills, please contact your pharmacy 3 days before your prescriptions runs out or download the [facility] joel for your smart phone. If you are out of refills, your pharmacy will contact contact the clinic.                                     Oneil Whatley [phone]                    Return in about 3 months (around 2/28/2023) for Physical Exam.    Paul Rizzo MD, MD  [facility] JOSE RAFAEL Cedeno is a 47 year old, presenting for the following health issues:  Hypertension      History of Present Illness       Hypertension: He presents for follow up of hypertension.  He does not check blood pressure  regularly outside of the clinic. Outpatient blood pressures have not been over 140/90. He does not follow a low salt diet.       Pt is due for a refill on his hydrochlorothiazide.  His usual MD has left the clinic.  He's in [place].      He hasn't been checking his blood pressures.            Review of Systems   Constitutional, HEENT, cardiovascular, pulmonary, gi and gu systems are negative, except as otherwise noted.      Objective           Vitals:  No vitals were obtained today due to virtual visit.    Physical Exam   GENERAL: Healthy, alert and no distress  EYES: Eyes grossly normal to inspection.  No discharge or erythema, or obvious scleral/conjunctival abnormalities.  RESP: No audible wheeze, cough, or visible cyanosis.  No visible retractions or increased work of breathing.    SKIN: Visible skin clear. No significant rash, abnormal pigmentation or lesions.  NEURO: Cranial nerves grossly intact.  Mentation and speech appropriate for age.  PSYCH: Mentation appears normal, affect normal/bright, judgement and insight intact, normal speech and appearance well-groomed.    Office Visit on 10/10/2022   Component Date Value Ref Range Status     Case Report 10/10/2022    Final                     Value:Surgical Pathology Report                         Case: [id number]                                  Authorizing Provider:  Jose J Cavazos MD          Collected:           10/10/2022 03:40 PM          Ordering Location:     [facility]   Received:            10/10/2022 04:40 PM                                 [facility]                                                           Pathologist:           Herbert Justice MD                                                      Specimen:    Skin, Lower back                                                                            Final Diagnosis 10/10/2022    Final                    Value:This result contains rich text formatting which cannot be displayed here.     Clinical Information 10/10/2022    Final                    Value:This result contains rich text formatting which cannot be displayed here.     Gross Description 10/10/2022    Final                    Value:This result contains rich text formatting which cannot be displayed here.     Microscopic Description 10/10/2022    Final                    Value:This result contains rich text formatting which cannot be displayed here.     Performing Labs 10/10/2022    Final                    Value:This result contains rich text formatting which cannot be displayed here.     No results found for any visits on 11/28/22.  No results found for this or any previous visit (from the past 24 hour(s)).            Video-Visit Details    Video Start Time: 3:10 PM    Type of service:  Video Visit    Video End Time:3:20 PM    Originating Location (pt. Location): Home        Distant Location (provider location):  On-site    Platform used for Video Visit: Eva

## 2022-11-28 NOTE — PATIENT INSTRUCTIONS
Thank you for visiting [facility]    Please get follow-up labs and blood pressure within the next month or 2.    If it is more convenient, you could just schedule a physical with your regular clinic.    Try to increase your fiber intake to help with your constipation.  If unable to wean off of the MiraLAX, that is probably reasonable, but increased fiber intake would be more physiologic.    Contact us or return if questions or concerns.     Have a nice day!    Dr. Rizzo     Return in about 3 months (around 2/28/2023) for Physical Exam.      If you need medication refills, please contact your pharmacy 3 days before your prescriptions runs out or download the [facility] joel for your smart phone. If you are out of refills, your pharmacy will contact contact the clinic.                                     MyChart Assistance [phone]

## 2023-01-04 NOTE — TELEPHONE ENCOUNTER
-------------------------------------------------------------------------------

          *** Note lucrecia in EDM - 01/04/23 at 1925 by [id number] ***           

-------------------------------------------------------------------------------

Patient will be admitted to care of ICU 2. Admited to ICU.  Will go to room. 
Belongings list completed.  Report to . Routing refill request to provider for review/approval because:  Labs out of range:  BP  Labs not current:  Creatinine, potassium, sodium    BP Readings from Last 3 Encounters:   04/28/21 (!) 142/102   02/01/21 (!) 143/91   02/19/20 122/84     Rangel CORTES RN

## 2023-03-09 DIAGNOSIS — B00.1 RECURRENT COLD SORES: ICD-10-CM

## 2023-03-09 NOTE — TELEPHONE ENCOUNTER
"Pending Prescriptions:                       Disp   Refills    acyclovir (ZOVIRAX) 400 MG tablet [Pharmac*180 ta*0        Sig: TAKE 1 TABLET BY MOUTH TWICE A DAY    Routing refill request to provider for review/approval because:  Requested Prescriptions   Pending Prescriptions Disp Refills    acyclovir (ZOVIRAX) 400 MG tablet [Pharmacy Med Name: ACYCLOVIR 400 MG TABLET] 180 tablet 0     Sig: TAKE 1 TABLET BY MOUTH TWICE A DAY       Antivirals for Herpes Protocol Failed - 3/9/2023 12:46 AM        Failed - Recent (12 mo) or future (30 days) visit within the authorizing provider's specialty     Patient has had an office visit with the authorizing provider or a provider within the authorizing providers department within the previous 12 mos or has a future within next 30 days. See \"Patient Info\" tab in inbasket, or \"Choose Columns\" in Meds & Orders section of the refill encounter.              Failed - Normal serum creatinine on file in past 12 months     Recent Labs   Lab Test 09/15/21  0921   CR 1.04       Ok to refill medication if creatinine is low          Passed - Patient is age 12 or older        Passed - Medication is active on med list           acyclovir (ZOVIRAX) 400 MG tablet 180 tablet 0 11/28/2022  No   Sig - Route: Take 1 tablet (400 mg) by mouth 2 times daily - Oral   Sent to pharmacy as: Acyclovir 400 MG Oral Tablet (ZOVIRAX)   Class: E-Prescribe   Order: [id number]   E-Prescribing Status: Receipt confirmed by pharmacy (11/28/2022  3:18 PM CST)     Verena Borden RN on 3/9/2023 at 3:25 PM        "

## 2023-03-10 DIAGNOSIS — I10 ESSENTIAL HYPERTENSION: ICD-10-CM

## 2023-03-10 RX ORDER — HYDROCHLOROTHIAZIDE 12.5 MG/1
TABLET ORAL
Qty: 90 TABLET | Refills: 0 | OUTPATIENT
Start: 2023-03-10

## 2023-03-10 RX ORDER — ACYCLOVIR 400 MG/1
TABLET ORAL
Qty: 180 TABLET | Refills: 0 | OUTPATIENT
Start: 2023-03-10

## 2023-03-10 NOTE — TELEPHONE ENCOUNTER
Pending Prescriptions:                       Disp   Refills    hydrochlorothiazide (HYDRODIURIL) 12.5 MG *90 tab*0        Sig: TAKE 1 TABLET BY MOUTH EVERY DAY      Routing refill request to provider for review/approval because:  Labs not current:  creatinine, potassium, sodium    Mera Lopez RN on 3/10/2023 at 1:09 PM

## 2023-03-10 NOTE — TELEPHONE ENCOUNTER
Called patient and informed him of providers note below.  Patient said he is going to establish care with someone closer to him.

## 2023-03-10 NOTE — TELEPHONE ENCOUNTER
Pt is overdue for follow up labs and blood pressure check.  Please schedule these.  Once scheduled, can refill enough medication to get patient to the visit.

## 2023-03-10 NOTE — TELEPHONE ENCOUNTER
Called patient and informed him of providers note.  Patient said he is going to establish care closer to [place].

## 2023-03-10 NOTE — TELEPHONE ENCOUNTER
Pt is overdue for follow up blood pressure labs.  Please help him get these scheduled.  Once scheduled, can refill enough medication to get patient to the visit.

## 2023-04-23 ENCOUNTER — HEALTH MAINTENANCE LETTER (OUTPATIENT)
Age: 48
End: 2023-04-23

## 2023-05-17 ASSESSMENT — ENCOUNTER SYMPTOMS
PARESTHESIAS: 0
COUGH: 0
HEMATOCHEZIA: 0
WEAKNESS: 0
NERVOUS/ANXIOUS: 0
DIZZINESS: 0
CHILLS: 0
SORE THROAT: 0
ARTHRALGIAS: 1
JOINT SWELLING: 0
ABDOMINAL PAIN: 1
DYSURIA: 0
EYE PAIN: 0
CONSTIPATION: 1
SHORTNESS OF BREATH: 1
MYALGIAS: 0
FEVER: 0
PALPITATIONS: 0
FREQUENCY: 0
DIARRHEA: 1
HEMATURIA: 0
HEADACHES: 1
HEARTBURN: 1
NAUSEA: 0

## 2023-05-18 ENCOUNTER — OFFICE VISIT (OUTPATIENT)
Dept: FAMILY MEDICINE | Facility: CLINIC | Age: 48
End: 2023-05-18
Payer: COMMERCIAL

## 2023-05-18 VITALS
OXYGEN SATURATION: 99 % | WEIGHT: 248 LBS | RESPIRATION RATE: 14 BRPM | TEMPERATURE: 97.8 F | DIASTOLIC BLOOD PRESSURE: 82 MMHG | HEIGHT: 74 IN | BODY MASS INDEX: 31.83 KG/M2 | HEART RATE: 70 BPM | SYSTOLIC BLOOD PRESSURE: 138 MMHG

## 2023-05-18 DIAGNOSIS — Z00.00 ROUTINE GENERAL MEDICAL EXAMINATION AT A HEALTH CARE FACILITY: Primary | ICD-10-CM

## 2023-05-18 DIAGNOSIS — B00.1 RECURRENT COLD SORES: ICD-10-CM

## 2023-05-18 DIAGNOSIS — Z86.0100 HISTORY OF COLONIC POLYPS: ICD-10-CM

## 2023-05-18 DIAGNOSIS — I10 HYPERTENSION GOAL BP (BLOOD PRESSURE) < 130/80: ICD-10-CM

## 2023-05-18 LAB
ERYTHROCYTE [DISTWIDTH] IN BLOOD BY AUTOMATED COUNT: 11.8 % (ref 10–15)
HCT VFR BLD AUTO: 47 % (ref 40–53)
HGB BLD-MCNC: 16.5 G/DL (ref 13.3–17.7)
MCH RBC QN AUTO: 30.4 PG (ref 26.5–33)
MCHC RBC AUTO-ENTMCNC: 35.1 G/DL (ref 31.5–36.5)
MCV RBC AUTO: 87 FL (ref 78–100)
PLATELET # BLD AUTO: 257 10E3/UL (ref 150–450)
RBC # BLD AUTO: 5.42 10E6/UL (ref 4.4–5.9)
WBC # BLD AUTO: 5.9 10E3/UL (ref 4–11)

## 2023-05-18 PROCEDURE — 82570 ASSAY OF URINE CREATININE: CPT | Performed by: FAMILY MEDICINE

## 2023-05-18 PROCEDURE — 82043 UR ALBUMIN QUANTITATIVE: CPT | Performed by: FAMILY MEDICINE

## 2023-05-18 PROCEDURE — 36415 COLL VENOUS BLD VENIPUNCTURE: CPT | Performed by: FAMILY MEDICINE

## 2023-05-18 PROCEDURE — 99396 PREV VISIT EST AGE 40-64: CPT | Performed by: FAMILY MEDICINE

## 2023-05-18 PROCEDURE — 80053 COMPREHEN METABOLIC PANEL: CPT | Performed by: FAMILY MEDICINE

## 2023-05-18 PROCEDURE — 80061 LIPID PANEL: CPT | Performed by: FAMILY MEDICINE

## 2023-05-18 PROCEDURE — 85027 COMPLETE CBC AUTOMATED: CPT | Performed by: FAMILY MEDICINE

## 2023-05-18 RX ORDER — ACYCLOVIR 400 MG/1
400 TABLET ORAL 2 TIMES DAILY
Qty: 180 TABLET | Refills: 0 | Status: SHIPPED | OUTPATIENT
Start: 2023-05-18 | End: 2023-05-18

## 2023-05-18 RX ORDER — ACYCLOVIR 400 MG/1
400 TABLET ORAL 2 TIMES DAILY
Qty: 180 TABLET | Refills: 3 | Status: SHIPPED | OUTPATIENT
Start: 2023-05-18 | End: 2024-06-11 | Stop reason: ALTCHOICE

## 2023-05-18 RX ORDER — HYDROCHLOROTHIAZIDE 12.5 MG/1
12.5 TABLET ORAL DAILY
Qty: 90 TABLET | Refills: 3 | Status: SHIPPED | OUTPATIENT
Start: 2023-05-18 | End: 2024-05-13

## 2023-05-18 ASSESSMENT — ENCOUNTER SYMPTOMS
SHORTNESS OF BREATH: 1
ARTHRALGIAS: 1
HEARTBURN: 1
FREQUENCY: 0
PALPITATIONS: 0
HEADACHES: 1
ABDOMINAL PAIN: 1
HEMATOCHEZIA: 0
COUGH: 0
SORE THROAT: 0
DIZZINESS: 0
HEMATURIA: 0
DIARRHEA: 1
FEVER: 0
PARESTHESIAS: 0
NAUSEA: 0
WEAKNESS: 0
NERVOUS/ANXIOUS: 0
JOINT SWELLING: 0
CHILLS: 0
EYE PAIN: 0
MYALGIAS: 0
CONSTIPATION: 1
DYSURIA: 0

## 2023-05-18 ASSESSMENT — PAIN SCALES - GENERAL: PAINLEVEL: NO PAIN (0)

## 2023-05-18 NOTE — PROGRESS NOTES
SUBJECTIVE:   CC: Wilian is an 47 year old who presents for preventative health visit.     Patient has been advised of split billing requirements and indicates understanding: Yes  Healthy Habits:     Getting at least 3 servings of Calcium per day:  Yes    Bi-annual eye exam:  Yes    Dental care twice a year:  Yes    Sleep apnea or symptoms of sleep apnea:  None    Diet:  Regular (no restrictions)    Frequency of exercise:  1 day/week    Duration of exercise:  15-30 minutes    Taking medications regularly:  Yes    Medication side effects:  None    PHQ-2 Total Score: 2    Additional concerns today:  Yes    Hypertension Follow-up      Do you check your blood pressure regularly outside of the clinic? No out of hydrochlorothiazide for almost 2 months     Are you following a low salt diet? Yes does not add salt to food     Are your blood pressures ever more than 140 on the top number (systolic) OR more   than 90 on the bottom number (diastolic), for example 140/90? Yes    BP Readings from Last 6 Encounters:   23 138/82   21 126/82   21 (!) 120/96   21 137/85   09/15/21 130/86   21 (!) 142/102       Today's PHQ-2 Score:       2023    11:14 AM   PHQ-2 (  [employer])   Q1: Little interest or pleasure in doing things 1   Q2: Feeling down, depressed or hopeless 1   PHQ-2 Score 2   Q1: Little interest or pleasure in doing things Several days   Q2: Feeling down, depressed or hopeless Several days   PHQ-2 Score 2       Social History     Tobacco Use     Smoking status: Former     Types: Cigarettes     Quit date:      Years since quittin.3     Smokeless tobacco: Former     Types: Chew     Quit date:      Tobacco comments:     quit smoking in early  and quit chew in    Vaping Use     Vaping status: Never Used   Substance Use Topics     Alcohol use: Yes     Alcohol/week: 2.0 standard drinks of alcohol     Types: 2 Standard drinks or equivalent per week     Comment: [email or web address]           " 5/17/2023    11:14 AM   Alcohol Use   Prescreen: >3 drinks/day or >7 drinks/week? No     Reviewed orders with patient. Reviewed health maintenance and updated orders accordingly - Yes      Reviewed and updated as needed this visit by clinical staff   Tobacco  Allergies  Meds  Problems  Med Hx  Surg Hx  Fam Hx          Reviewed and updated as needed this visit by Provider   Tobacco  Allergies  Meds  Problems  Med Hx  Surg Hx  Fam Hx             Review of Systems   Constitutional: Negative for chills and fever.   HENT: Positive for congestion and hearing loss. Negative for ear pain and sore throat.    Eyes: Negative for pain and visual disturbance.   Respiratory: Positive for shortness of breath. Negative for cough.    Cardiovascular: Positive for peripheral edema. Negative for chest pain and palpitations.   Gastrointestinal: Positive for abdominal pain, constipation, diarrhea and heartburn. Negative for hematochezia and nausea.   Genitourinary: Negative for dysuria, frequency, genital sores, hematuria, impotence, penile discharge and urgency.   Musculoskeletal: Positive for arthralgias. Negative for joint swelling and myalgias.   Skin: Negative for rash.   Neurological: Positive for headaches. Negative for dizziness, weakness and paresthesias.   Psychiatric/Behavioral: Negative for mood changes. The patient is not nervous/anxious.        OBJECTIVE:   /82   Pulse 70   Temp 97.8  F (36.6  C) (Tympanic)   Resp 14   Ht 1.88 m (6' 2\")   Wt 112.5 kg (248 lb)   SpO2 99%   BMI 31.84 kg/m    EXAM:  GENERAL: healthy, alert and no distress  EYES: Eyes grossly normal to inspection, PERRL and conjunctivae and sclerae normal  HENT: ear canals and TM's normal, nose and mouth without ulcers or lesions  NECK: no adenopathy, no asymmetry, masses, or scars and thyroid normal to palpation  RESP: lungs clear to auscultation - no rales, rhonchi or wheezes  BREAST: normal without masses, tenderness or nipple " "discharge and no palpable axillary masses or adenopathy  CV: regular rate and rhythm, normal S1 S2, no S3 or S4, no murmur, click or rub, no peripheral edema and peripheral pulses strong  ABDOMEN: soft, nontender, no hepatosplenomegaly, no masses and bowel sounds normal   (male): normal male genitalia without lesions or urethral discharge, no hernia  MS: no gross musculoskeletal defects noted, no edema  SKIN: no suspicious lesions or rashes  NEURO: Normal strength and tone, mentation intact and speech normal  PSYCH: mentation appears normal, affect normal/bright  LYMPH: no cervical, supraclavicular, axillary, or inguinal adenopathy        ASSESSMENT/PLAN:   Routine general medical examination at a health care facility      Hypertension goal BP (blood pressure) < 130/80  Controlled - continue medication(s).  Will restart:  - hydrochlorothiazide (HYDRODIURIL) 12.5 MG tablet  Dispense: 90 tablet; Refill: 3  - Lipid panel reflex to direct LDL Fasting  - Comprehensive metabolic panel  - Albumin Random Urine Quantitative with Creat Ratio  - CBC with platelets  - Lipid panel reflex to direct LDL Fasting  - Comprehensive metabolic panel  - Albumin Random Urine Quantitative with Creat Ratio  - CBC with platelets    Recurrent cold sores  Controlled - continue medication(s).  - acyclovir (ZOVIRAX) 400 MG tablet  Dispense: 180 tablet; Refill: 3    History of colonic polyps  Up-to-date on colonoscopy          COUNSELING:  Reviewed preventive health counseling, as reflected in patient instructions      BMI:   Estimated body mass index is 31.84 kg/m  as calculated from the following:    Height as of this encounter: 1.88 m (6' 2\").    Weight as of this encounter: 112.5 kg (248 lb).   Weight management plan: Discussed healthy diet and exercise guidelines           reports that he quit smoking about 25 years ago. His smoking use included cigarettes. He quit smokeless tobacco use about 3 years ago.  His smokeless tobacco use " included chew.      No follow-ups on file.           Rosas Mg MD     [address] [address]  [email or web address]     Office: [phone]

## 2023-05-19 LAB
ALBUMIN SERPL BCG-MCNC: 4.5 G/DL (ref 3.5–5.2)
ALP SERPL-CCNC: 61 U/L (ref 40–129)
ALT SERPL W P-5'-P-CCNC: 26 U/L (ref 10–50)
ANION GAP SERPL CALCULATED.3IONS-SCNC: 11 MMOL/L (ref 7–15)
AST SERPL W P-5'-P-CCNC: 23 U/L (ref 10–50)
BILIRUB SERPL-MCNC: 0.7 MG/DL
BUN SERPL-MCNC: 9.5 MG/DL (ref 6–20)
CALCIUM SERPL-MCNC: 9.5 MG/DL (ref 8.6–10)
CHLORIDE SERPL-SCNC: 103 MMOL/L (ref 98–107)
CREAT SERPL-MCNC: 0.99 MG/DL (ref 0.67–1.17)
CREAT UR-MCNC: 20.5 MG/DL
DEPRECATED HCO3 PLAS-SCNC: 26 MMOL/L (ref 22–29)
GFR SERPL CREATININE-BSD FRML MDRD: >90 ML/MIN/1.73M2
GLUCOSE SERPL-MCNC: 73 MG/DL (ref 70–99)
MICROALBUMIN UR-MCNC: <12 MG/L
MICROALBUMIN/CREAT UR: NORMAL MG/G{CREAT}
POTASSIUM SERPL-SCNC: 4.3 MMOL/L (ref 3.4–5.3)
PROT SERPL-MCNC: 6.8 G/DL (ref 6.4–8.3)
SODIUM SERPL-SCNC: 140 MMOL/L (ref 136–145)

## 2023-05-20 LAB
CHOLEST SERPL-MCNC: 216 MG/DL
HDLC SERPL-MCNC: 49 MG/DL
LDLC SERPL CALC-MCNC: 147 MG/DL
NONHDLC SERPL-MCNC: 167 MG/DL
TRIGL SERPL-MCNC: 101 MG/DL

## 2023-05-20 NOTE — RESULT ENCOUNTER NOTE
Dear Wilian,    Here is a summary of your recent test results:  -Normal red blood cell (hgb) levels, normal white blood cell count and normal platelet levels.  -Lipid panel: The ASCVD risk score returns the percentage likelihood of a first time Atherosclerotic Cardiovascular Disease (ASCVD) event.    The 10-year ASCVD risk score (Carine MORRIS, et al., 2019) is: 3%    Values used to calculate the score:      Age: 47 years      Sex: Male      Is Non- : No      Diabetic: No      Tobacco smoker: No      Systolic Blood Pressure: 138 mmHg      Is BP treated: No      HDL Cholesterol: 49 mg/dL      Total Cholesterol: 216 mg/dL    -3% of patients that have a similar cholesterol profile to you will have a stroke, heart attack or death (related to heart disease) within the next 10 years and that is considered a low risk and cholesterol lowering medications are not  recommended at this time (high risk is >10%, or >7.5% if other risk factors such has high blood pressure or other heart disease risk factors).    -LDL(bad) cholesterol level is elevated which can increase your heart disease risk.  A diet high in fat and simple carbohydrates, genetics and being overweight can contribute to this. ADVISE: exercising 150 minutes of aerobic exercise per week (30 minutes for 5 days per week or 50 minutes for 3 days per week are options) and eating a low saturated fat/low carbohydrate diet are helpful to improve this.  -Liver and gallbladder tests are normal (ALT,AST, Alk phos, bilirubin), kidney function is normal (Cr, GFR), sodium is normal, potassium is normal, calcium is normal, glucose is normal.  -Microalbumin (urine protein) test is normal.  ADVISE: rechecking this annually.    For additional lab test information, [email or web address] is a very good reference.           Thank you very much for trusting me and [facility].     Have a peaceful day.    Healthy regards,  Rosas Mg MD

## 2023-07-23 ENCOUNTER — HOSPITAL ENCOUNTER (EMERGENCY)
Facility: CLINIC | Age: 48
Discharge: HOME | End: 2023-07-23
Attending: EMERGENCY MEDICINE | Admitting: EMERGENCY MEDICINE
Payer: COMMERCIAL

## 2023-07-23 VITALS
HEIGHT: 75 IN | DIASTOLIC BLOOD PRESSURE: 96 MMHG | OXYGEN SATURATION: 92 % | RESPIRATION RATE: 18 BRPM | SYSTOLIC BLOOD PRESSURE: 144 MMHG | BODY MASS INDEX: 29.84 KG/M2 | HEART RATE: 86 BPM | TEMPERATURE: 98 F | WEIGHT: 240 LBS

## 2023-07-23 DIAGNOSIS — M54.16 LUMBAR BACK PAIN WITH RADICULOPATHY AFFECTING RIGHT LOWER EXTREMITY: ICD-10-CM

## 2023-07-23 PROCEDURE — 99284 EMERGENCY DEPT VISIT MOD MDM: CPT

## 2023-07-23 PROCEDURE — 250N000012 HC RX MED GY IP 250 OP 636 PS 637

## 2023-07-23 RX ORDER — CYCLOBENZAPRINE HCL 5 MG
5 TABLET ORAL 3 TIMES DAILY PRN
Qty: 20 TABLET | Refills: 0 | Status: SHIPPED | OUTPATIENT
Start: 2023-07-23 | End: 2024-06-11

## 2023-07-23 RX ORDER — PREDNISONE 20 MG/1
TABLET ORAL
Qty: 10 TABLET | Refills: 0 | Status: SHIPPED | OUTPATIENT
Start: 2023-07-23 | End: 2024-06-11

## 2023-07-23 RX ADMIN — DEXAMETHASONE 10 MG: 2 TABLET ORAL at 13:37

## 2023-07-23 ASSESSMENT — ACTIVITIES OF DAILY LIVING (ADL): ADLS_ACUITY_SCORE: 33

## 2023-07-23 NOTE — ED PROVIDER NOTES
Emergency Department Attending Supervision Note  7/23/2023  1:06 PM      I evaluated this patient in conjunction with Julienne Boo PA-C      Briefly, the patient presented with low back pain      On my exam, patient is standing and well-appearing.  Pain localized to the right lumbar deep lumbar spine in the area of L5-S1.    Results:    ED course:    My impression is acute on chronic back pain.  Patient does not request opiates but offering steroid little Dosepak and muscle relaxant.  Due to patient's significant improvement prior epidural offered a outpatient epidural order as a bridge to follow-up as an outpatient.  No concerns for discitis cauda equina or other spinal emergency.        Diagnosis    ICD-10-CM    1. Lumbar back pain with radiculopathy affecting right lower extremity  M54.16 XR Lumbar Epidural Injection Incl Imaging            Edgar Combs MD Goodman, Brian Samuel, MD  07/23/23 2653

## 2023-07-23 NOTE — DISCHARGE INSTRUCTIONS
-Follow up with your pain management specialist as soon as you are able to for reevaluation and discussion of repeat steroid injection  -Return to ED for fevers, worsening pain, bowel/bladder incontinence or retention, groin numbness, or any other new concerns    Discharge Instructions  Back Pain  You were seen today for back pain. Back pain can have many causes, but most will get better without surgery or other specific treatment. Sometimes there is a herniated ( slipped ) disc. We do not usually do MRI scans to look for these right away, since most herniated discs will get better on their own with time.  Today, we did not find any evidence that your back pain was caused by a serious condition. However, sometimes symptoms develop over time and cannot be found during an emergency visit, so it is very important that you follow up with your primary provider.  Generally, every Emergency Department visit should have a follow-up clinic visit with either a primary or a specialty clinic/provider. Please follow-up as instructed by your emergency provider today.    Return to the Emergency Department if:  You develop a fever with your back pain.   You have weakness or change in sensation in one or both legs.  You lose control of your bowels or bladder, or cannot empty your bladder (cannot pee).  Your pain gets much worse.     Follow-up with your provider:  Unless your pain has completely gone away, please make an appointment with your provider within one week. Most of the routine care for back pain is available in a clinic and not the Emergency Department. You may need further management of your back pain, such as more pain medication, imaging such as an X-ray or MRI, or physical therapy.    What can I do to help myself?  Remain Active -- People are often afraid that they will hurt their back further or delay recovery by remaining active, but this is one of the best things you can do for your back. In fact, staying in bed for  a long time to rest is not recommended. Studies have shown that people with low back pain recover faster when they remain active. Movement helps to bring blood flow to the muscles and relieve muscle spasms as well as preventing loss of muscle strength.  Heat -- Using a heating pad can help with low back pain during the first few weeks. Do not sleep with a heating pad, as you can be burned.   Pain medications - You may take a pain medication such as Tylenol  (acetaminophen), Advil , Motrin  (ibuprofen) or Aleve  (naproxen).  If you were given a prescription for medicine here today, be sure to read all of the information (including the package insert) that comes with your prescription.  This will include important information about the medicine, its side effects, and any warnings that you need to know about.  The pharmacist who fills the prescription can provide more information and answer questions you may have about the medicine.  If you have questions or concerns that the pharmacist cannot address, please call or return to the Emergency Department.   Remember that you can always come back to the Emergency Department if you are not able to see your regular provider in the amount of time listed above, if you get any new symptoms, or if there is anything that worries you.

## 2023-07-23 NOTE — ED TRIAGE NOTES
Pt arrives for back pain that is worsening since Monday. Can't sit for more than 90 seconds. Started Monday after sneeze. Has been trying patches, creams, tylenol, ibuprofen, heat/cold with no relied. Has had chiropractic and cortisone injections with minimal results.

## 2023-07-23 NOTE — ED PROVIDER NOTES
"  History     Chief Complaint:  Back Pain       HPI   Paul Mckeon is a 47 year old male with a history of back pain who presents for evaluation of back pain. The patient states that 6 days ago he sneezed and developed an exacerbation of his chronic back pain. He notes that since then the pain has been intermittent, but worsening over the last few days. Position changes exacerbate the pain making it difficult to transition from laying to sitting, sitting to standing. He notes that walking helps relieve the pain mildly. He has tried Tylenol/Ibuprofen, salon pas, ice/heat, and a theragun without significant relief. He notes that he has had adjustments in the past by a chiropractor, but was unable to see one today as its the weekend. He also received a steroid injection approximately 18 months ago which provided significant relief of symptoms. The patient notes that he has had difficulty with starting urination and constipation, but otherwise denies saddle anesthesia, urinary retention, or incontinence. He denies fevers or recent trauma/injury.    Independent Historian:   None - Patient Only    Review of External Notes:   9/29/21: MR Lumbar spine performed which shows degenerative change, worst at L5-S1 and a disc bulge at L4-L5 with central annular fissure    Medications:    Zovirax   Hydrochlorothiazide     Past Medical History:    HTN   HLD   Colonic polyp    Past Surgical History:    L ACL and PCL reconstruction   Colonoscopy w/ polypectomies   R hand ORIF   Tonsillectomy     Physical Exam     Patient Vitals for the past 24 hrs:   BP Temp Temp src Pulse Resp SpO2 Height Weight   07/23/23 1227 (!) 163/101 98  F (36.7  C) Temporal 100 18 96 % 1.905 m (6' 3\") 108.9 kg (240 lb)        Physical Exam  General: Alert, appears well-developed and well-nourished. Cooperative.     In moderate distress. Difficulty moving from sitting to standing.   HEENT:  Head:  Atraumatic  Ears:  External ears are normal  Eyes: "   Conjunctivae normal and EOM are normal. No scleral icterus.    Pupils are equal, round, and reactive to light.   Neck:   Normal range of motion. Neck supple.  CV:  Normal rate, regular rhythm, normal heart sounds and radial and dorsalis pedis pulses are 2+ and symmetric.  No murmur.  Resp:  Breath sounds are clear bilaterally    Non-labored, no retractions or accessory muscle use  GI:  Abdomen is soft, no distension, no tenderness. No rebound or guarding.  MS:  Normal range of motion. No edema.    Normal strength in all 4 extremities.     Back atraumatic.    No midline cervical, thoracic, or lumbar tenderness    Lidocaine patch over right lumbar spine. TTP over right lumbar paraspinal musculature and right SI joint with radiculopathy. 4/5 hip flexion of right lower extremity limited secondary to pain. 5/5 strength left hip flexion, bilateral knee flexion/extension, bilateral dorsiflexion/plantarflexion. Peripheral sensation to light touch intact distally bilaterally. DP pulses 2+. Compartments soft and nontender. Distal CMS intact.   Skin:  Warm and dry.  No rash or lesions noted.  Neuro: Alert. Normal strength.  Sensation intact in all 4 extremities. GCS: 15  Psych:  Normal mood and affect.    Emergency Department Course   Procedures   None    Emergency Department Course & Assessments:    Interventions:  Medications   dexamethasone (DECADRON) tablet 10 mg (10 mg Oral $Given 7/23/23 1814)        Assessments:  1302: I obtained history and examined the patient as noted above.    Independent Interpretation (X-rays, CTs, rhythm strip):  None    Consultations/Discussion of Management or Tests:  This patient was seen in conjunction with Dr. Combs.        Social Determinants of Health affecting care:   None    Disposition:  The patient was discharged to home.     Impression & Plan    CMS Diagnoses: None    Medical Decision Making:  Paul Mckeon is a 47 year old male who presents for evaluation of back pain and  radicular symptoms. He has a history of back pain in the past. He did not sustain any trauma, therefore x-rays are not necessary due to the low likelihood of fracture or subluxation. Advanced imaging with CT/MRI is not indicated at this time, and the patient had an MRI performed in 2021 of his lumbar spine which was reviewed as noted above.  There is no clinical evidence of cauda equina syndrome, discitis, spinal/epidural space hematoma or epidural abscess or other emergently worrisome etiology.     The neurological exam is normal, with the exception of limited strength of hip flexion decreased due to pain.  We provided the patient with a dose of dexamethasone while in the ER and outpatient prescriptions for prednisone and flexeril for symptomatic management. We also gave the patient a referral for an MIGDALIA or he can follow up with his established pain management specialist for this. He should return for fevers, increasing pain, muscular weakness, bowel or bladder dysfunction, or any other new concerns. The patient was in agreement with this plan and all questions were answered.      Diagnosis:    ICD-10-CM    1. Lumbar back pain with radiculopathy affecting right lower extremity  M54.16            Discharge Medications:  New Prescriptions    CYCLOBENZAPRINE (FLEXERIL) 5 MG TABLET    Take 1 tablet (5 mg) by mouth 3 times daily as needed for muscle spasms    PREDNISONE (DELTASONE) 20 MG TABLET    Take two tablets (= 40mg) each day for 5 (five) days          Julienne Boo PA-C  7/23/2023      Julienne Boo PA-C  07/23/23 6424

## 2023-07-28 ENCOUNTER — ANCILLARY ORDERS (OUTPATIENT)
Dept: PALLIATIVE MEDICINE | Facility: CLINIC | Age: 48
End: 2023-07-28

## 2023-07-28 DIAGNOSIS — M54.16 LUMBAR RADICULOPATHY: Primary | ICD-10-CM

## 2023-08-09 NOTE — PROGRESS NOTES
[facility] - Procedure Note    Date of Visit: 8/10/2023    Procedure performed: Lumbar L4-L5 interlaminar epidural steroid injection  Diagnosis: Lumbar spondylosis; Lumbar radiculitis/radiculopathy  : Gricelda Armstrong MD  Anesthesia: none    Indications: Paul Mckeon is a 47 year old male who is seen at the request of Dr. Combs for a lumbar epidural steroid injection. The patient describes bilateral low back pain with radiation to the upper buttock. The patient has been exhibiting symptoms consistent with lumbar intraspinal inflammation and radiculopathy. Symptoms have been persistent, disabling, and intermittently severe. The patient reports minimal improvement with conservative treatment, including chiropractic, ice/heat, massage, PT and medications.    Previous L5-S1 ILESI done by Dr. Villarreal on 11/8/2021 provided good pain relief for a period of 12 months.       MRI of the LUMBAR SPINE was done on 9/29/2021 which showed:   L4-L5:  Disc height loss. Disc bulge with central annular fissure.  Mild bilateral facet arthropathy. No right foraminal stenosis. Mild  left foraminal stenosis. No spinal canal stenosis.       L5-S1:  Severe disc height loss. Presumed degenerative disc T2  hyperintensity, right worse than left. Disc bulge, slightly symmetric  to the right with broad central bulge versus protrusion component  which does not appear to impinge upon the traversing S1 nerve roots  within the lateral recesses. Mild bilateral facet arthropathy.  Moderate to severe bilateral foraminal stenosis. No spinal canal  stenosis.                                                                   IMPRESSION:    1. Degenerative change as detailed, worst at L5-S1.  2. Disc bulge at L4-L5 with central annular fissure.     Allergies:    No Known Allergies     Vitals:  BP (!) 140/90   Pulse 82   SpO2 96%     Review of Systems: The patient denies recent fever, chills, illness, use of  antibiotics or anticoagulants. All other 10-point review of systems negative.     Procedure: The procedure and risks were explained, and informed written consent was obtained from the patient. Risks include but are not limited to: infection, bleeding, increased pain, and damage to soft tissue, nerve, muscle, and vasculature structures. After getting informed consent, patient was brought into the procedure suite and was placed in a prone position on the procedure table. A Pause for the Cause was performed. Patient was prepped and draped in sterile fashion.     The L4-5 interspace was identified with use of fluoroscopy in AP view. A 25-gauge, 1.5 inch needle was used to anesthetize the skin and subcutaneous tissue entry site with a total of 2 ml of 1% lidocaine. Under fluoroscopic visualization, a 22-gauge, 4.5 inch Tuohy epidural needle was slowly advanced towards the epidural space a few millimeters left of midline. The latter part of the needle advancement was guided with fluoroscopy in the lateral view. The epidural space was identified using loss of resistance technique. After negative aspiration for heme and cerebrospinal fluid, a total of 1 mL of non-ionic contrast was injected to confirm needle placement with 0 mL of contrast wasted. Epidurogram confirmed spread within the posterior epidural space. 2 ml of 40mg/ml of triamcinolone, 2 ml of 1% lidocaine, and 1 ml of preservative free saline was injected. The needle was removed.  Images were saved to PACS.    The patient tolerated the procedure well, and there was no evidence of procedural complications. No new sensory or motor deficits were noted following the procedure. The patient was stable and able to ambulate on discharge home. Post-procedure instructions were provided.     Pre-procedure pain score: 3/10 in the back, 3/10 in the leg  Post-procedure pain score: 1/10 in the back, 1/10 in the leg    Assessment/Plan: Paul Mckeon is a 47 year old male s/p  lumbar interlaminar epidural steroid injection today for lumbar spondylosis and radiculitis/radiculopathy.     1. Following today's procedure, the patient was advised to contact the Pain Management Center for any of the following:   Fever, chills, or night sweats   New onset of pain, numbness, or weakness   Any questions/concerns regarding the procedure  If unable to contact the Pain Center, the patient was instructed to go to a local Emergency Room for any complications.   2. Follow-up with the referring provider in 2 weeks for post-procedure evaluation.    CARTER ALLEN MD   Pain Management

## 2023-08-10 ENCOUNTER — RADIOLOGY INJECTION OFFICE VISIT (OUTPATIENT)
Dept: PALLIATIVE MEDICINE | Facility: CLINIC | Age: 48
End: 2023-08-10
Payer: COMMERCIAL

## 2023-08-10 VITALS — OXYGEN SATURATION: 100 % | HEART RATE: 78 BPM | DIASTOLIC BLOOD PRESSURE: 85 MMHG | SYSTOLIC BLOOD PRESSURE: 142 MMHG

## 2023-08-10 DIAGNOSIS — M54.16 LUMBAR RADICULOPATHY: Primary | ICD-10-CM

## 2023-08-10 DIAGNOSIS — M54.16 LUMBAR BACK PAIN WITH RADICULOPATHY AFFECTING RIGHT LOWER EXTREMITY: ICD-10-CM

## 2023-08-10 PROCEDURE — 62323 NJX INTERLAMINAR LMBR/SAC: CPT | Performed by: ANESTHESIOLOGY

## 2023-08-10 RX ORDER — TRIAMCINOLONE ACETONIDE 40 MG/ML
40 INJECTION, SUSPENSION INTRA-ARTICULAR; INTRAMUSCULAR ONCE
Status: COMPLETED | OUTPATIENT
Start: 2023-08-10 | End: 2023-08-10

## 2023-08-10 RX ADMIN — TRIAMCINOLONE ACETONIDE 40 MG: 40 INJECTION, SUSPENSION INTRA-ARTICULAR; INTRAMUSCULAR at 15:57

## 2023-08-10 RX ADMIN — TRIAMCINOLONE ACETONIDE 40 MG: 40 INJECTION, SUSPENSION INTRA-ARTICULAR; INTRAMUSCULAR at 15:56

## 2023-08-10 NOTE — PATIENT INSTRUCTIONS
[facility] Procedure Discharge Instructions    Today you saw:   Dr. Gricelda Armstrong      Your procedure:  Epidural steroid injection       Medications used:  Lidocaine (anesthetic) Kenalog (steroid)  Omnipaque (contrast)   Saline         Be cautious when walking as numbness and/or weakness in the legs may occur up to 6-8 hours after the procedure due to effect of the local anesthetic  Do not drive for 6 hours. The effect of the local anesthetic could slow your reflexes.   Avoid strenuous activity for the first 24 hours. You may resume your regular activities after that.   You may shower, however avoid swimming, tub baths or hot tubs for 24 hours following your procedure  You may have a mild to moderate increase in pain for several days following the injection.    You may use ice packs for 10-15 minutes, 3 to 4 times a day at the injection site for comfort  Do not use heat to painful areas for 6 to 8 hours. This will give the local anesthetic time to wear off and prevent you from accidentally burning your skin.  Unless you have been directed to avoid the use of anti-inflammatory medications (NSAIDS-ibuprofen, Aleve, Motrin), you may use these medications or Tylenol for pain control if needed.   With diabetes, check your blood sugar more frequently than usual as your blood sugar may be higher than normal for 10-14 days following a steroid injection. Contact your doctor who manages your diabetes if your blood sugar is higher than usual  Possible side effects of steroids that you may experience include flushing, elevated blood pressure, increased appetite, mild headaches and restlessness.  All of these symptoms will get better with time.  It may take up to 14 days for the steroid medication to start working although you may feel the effect as early as a few days after the procedure.   Follow up with your PCP in 2-3 weeks    If you experience any of the following, call the pain center line during work  hours at [phone] or on-call physician after hours at [phone]:  -Fever over 100 degree F  -Swelling, bleeding, redness, drainage, warmth at the injection site  -Progressive weakness or numbness in your legs or arms  -Loss of bowel or bladder function  -Unusual headache that is not relieved by Tylenol or your regular headache medication  -Unusual new onset of pain that is not improving

## 2023-08-10 NOTE — NURSING NOTE
Discharge Information    IV Discontiued Time:  NA    Amount of Fluid Infused:  NA    Discharge Criteria = When patient returns to baseline or as per MD order    Consciousness:  Pt is fully awake    Circulation:  BP +/- 20% of pre-procedure level    Respiration:  Patient is able to breathe deeply    O2 Sat:  Patient is able to maintain O2 Sat >92% on room air    Activity:  Moves 4 extremities on command    Ambulation:  Patient is able to stand and walk or stand and pivot into wheelchair    Dressing:  Clean/dry or No Dressing    Notes:   Discharge instructions and AVS given to patient    Patient meets criteria for discharge?  YES    Admitted to PCU?  No    Responsible adult present to accompany patient home?  Yes    Signature/Title:    Francesca Blackburn RN  RN Care Coordinator  [facility]      
Pre-procedure Intake  If YES to any questions or NO to having a   Please complete laminated checklist and leave on the computer keyboard for Provider, verbally inform provider if able.    For SCS Trial, RFA's or any sedation procedure:  Have you been fasting? NA  If yes, for how long?     Are you taking any any blood thinners such as Coumadin, Warfarin, Jantoven, Pradaxa Xarelto, Eliquis, Edoxaban, Enoxaparin, Lovenox, Heparin, Arixtra, Fondaparinux, or Fragmin? OR Antiplatelet medication such as Plavix, Brilinta, or Effient?   No   If yes, when did you take your last dose?     Do you take aspirin?  No  If cervical procedure, have you held aspirin for 6 days?   NA    Do you have any allergies to contrast dye, iodine, steroid and/or numbing medications?  NO    Are you currently taking antibiotics or have an active infection?  NO    Have you had a fever/elevated temperature within the past week? NO    Are you currently taking oral steroids? NO    Do you have a ? Yes    Are you pregnant or breastfeeding?  Not Applicable    Have you received the COVID-19 vaccine? Yes  If yes, was it your 1st, 2nd or only dose needed? 2nd  Date of most recent vaccine: 05/20/21    Vitals: B/P 140/90    Notify provider and RNs if systolic BP >170, diastolic BP >100, P >100 or O2 sats < 90%      Zoraida Michelle MA  [facility] [place]    
no concerns

## 2023-11-13 NOTE — TELEPHONE ENCOUNTER
"Attempted to call pt, request below states to fill Rx at \"[facility].\" Need clarification on which pharmacy to send to. [facility], also sent MyChart message.    Harriet MOTT RN    " oriented to person, place, time and situation

## 2023-12-21 ENCOUNTER — PATIENT OUTREACH (OUTPATIENT)
Dept: GASTROENTEROLOGY | Facility: CLINIC | Age: 48
End: 2023-12-21
Payer: COMMERCIAL

## 2024-05-11 DIAGNOSIS — I10 HYPERTENSION GOAL BP (BLOOD PRESSURE) < 130/80: ICD-10-CM

## 2024-05-13 RX ORDER — HYDROCHLOROTHIAZIDE 12.5 MG/1
12.5 TABLET ORAL DAILY
Qty: 90 TABLET | Refills: 0 | Status: SHIPPED | OUTPATIENT
Start: 2024-05-13 | End: 2024-06-11

## 2024-06-06 SDOH — HEALTH STABILITY: PHYSICAL HEALTH: ON AVERAGE, HOW MANY MINUTES DO YOU ENGAGE IN EXERCISE AT THIS LEVEL?: 30 MIN

## 2024-06-06 SDOH — HEALTH STABILITY: PHYSICAL HEALTH: ON AVERAGE, HOW MANY DAYS PER WEEK DO YOU ENGAGE IN MODERATE TO STRENUOUS EXERCISE (LIKE A BRISK WALK)?: 2 DAYS

## 2024-06-06 ASSESSMENT — SOCIAL DETERMINANTS OF HEALTH (SDOH): HOW OFTEN DO YOU GET TOGETHER WITH FRIENDS OR RELATIVES?: ONCE A WEEK

## 2024-06-11 ENCOUNTER — OFFICE VISIT (OUTPATIENT)
Dept: FAMILY MEDICINE | Facility: CLINIC | Age: 49
End: 2024-06-11
Payer: COMMERCIAL

## 2024-06-11 VITALS
RESPIRATION RATE: 18 BRPM | HEART RATE: 89 BPM | TEMPERATURE: 98.1 F | DIASTOLIC BLOOD PRESSURE: 84 MMHG | BODY MASS INDEX: 33.11 KG/M2 | OXYGEN SATURATION: 100 % | SYSTOLIC BLOOD PRESSURE: 122 MMHG | HEIGHT: 74 IN | WEIGHT: 258 LBS

## 2024-06-11 DIAGNOSIS — B00.1 RECURRENT COLD SORES: ICD-10-CM

## 2024-06-11 DIAGNOSIS — E78.5 HYPERLIPIDEMIA LDL GOAL <130: ICD-10-CM

## 2024-06-11 DIAGNOSIS — F17.200 TOBACCO USE DISORDER: ICD-10-CM

## 2024-06-11 DIAGNOSIS — Z00.00 ROUTINE GENERAL MEDICAL EXAMINATION AT A HEALTH CARE FACILITY: Primary | ICD-10-CM

## 2024-06-11 DIAGNOSIS — M54.41 RIGHT-SIDED LOW BACK PAIN WITH RIGHT-SIDED SCIATICA, UNSPECIFIED CHRONICITY: ICD-10-CM

## 2024-06-11 DIAGNOSIS — I10 HYPERTENSION GOAL BP (BLOOD PRESSURE) < 130/80: ICD-10-CM

## 2024-06-11 DIAGNOSIS — E66.9 OBESITY WITH SERIOUS COMORBIDITY, UNSPECIFIED CLASSIFICATION, UNSPECIFIED OBESITY TYPE: ICD-10-CM

## 2024-06-11 DIAGNOSIS — N52.9 ERECTILE DYSFUNCTION, UNSPECIFIED ERECTILE DYSFUNCTION TYPE: ICD-10-CM

## 2024-06-11 PROCEDURE — 99396 PREV VISIT EST AGE 40-64: CPT | Performed by: FAMILY MEDICINE

## 2024-06-11 PROCEDURE — 99214 OFFICE O/P EST MOD 30 MIN: CPT | Mod: 25 | Performed by: FAMILY MEDICINE

## 2024-06-11 PROCEDURE — 82570 ASSAY OF URINE CREATININE: CPT | Performed by: FAMILY MEDICINE

## 2024-06-11 PROCEDURE — 82043 UR ALBUMIN QUANTITATIVE: CPT | Performed by: FAMILY MEDICINE

## 2024-06-11 RX ORDER — ACYCLOVIR 400 MG/1
400 TABLET ORAL 2 TIMES DAILY
Qty: 180 TABLET | Refills: 3 | Status: CANCELLED | OUTPATIENT
Start: 2024-06-11

## 2024-06-11 RX ORDER — BUPROPION HYDROCHLORIDE 150 MG/1
150 TABLET, FILM COATED, EXTENDED RELEASE ORAL 2 TIMES DAILY
Qty: 60 TABLET | Refills: 2 | Status: SHIPPED | OUTPATIENT
Start: 2024-06-11 | End: 2024-09-05

## 2024-06-11 RX ORDER — HYDROCHLOROTHIAZIDE 12.5 MG/1
12.5 TABLET ORAL DAILY
Qty: 90 TABLET | Refills: 4 | Status: SHIPPED | OUTPATIENT
Start: 2024-06-11

## 2024-06-11 RX ORDER — SILDENAFIL 50 MG/1
50-100 TABLET, FILM COATED ORAL DAILY PRN
Qty: 30 TABLET | Refills: 11 | Status: SHIPPED | OUTPATIENT
Start: 2024-06-11

## 2024-06-11 RX ORDER — SILDENAFIL 50 MG/1
50-100 TABLET, FILM COATED ORAL DAILY PRN
Qty: 30 TABLET | Refills: 11 | Status: SHIPPED | OUTPATIENT
Start: 2024-06-11 | End: 2024-06-11

## 2024-06-11 RX ORDER — VALACYCLOVIR HYDROCHLORIDE 1 G/1
2000 TABLET, FILM COATED ORAL 2 TIMES DAILY
Qty: 12 TABLET | Refills: 4 | Status: SHIPPED | OUTPATIENT
Start: 2024-06-11 | End: 2024-06-12

## 2024-06-11 NOTE — PROGRESS NOTES
Preventive Care Visit  [facility]  Charly Mg MD, Family Medicine  Jun 11, 2024        Assessment & Plan     Routine general medical examination at a health care facility      Hypertension goal BP (blood pressure) < 130/80  Controlled - continue medication(s).  - COMPREHENSIVE METABOLIC PANEL  - Albumin Random Urine Quantitative with Creat Ratio  - hydroCHLOROthiazide 12.5 MG tablet  Dispense: 90 tablet; Refill: 4  - Albumin Random Urine Quantitative with Creat Ratio    Hyperlipidemia LDL goal <130  Continue to monitor:  - Lipid panel reflex to direct LDL Non-fasting    Recurrent cold sores  Has been on daily acyclovir and will switch to as needed valacyclovir  - valACYclovir (VALTREX) 1000 mg tablet  Dispense: 12 tablet; Refill: 4    Obesity with serious comorbidity, unspecified classification, unspecified obesity type  Ongoing struggles with weight loss and will try semaglutide 0.25 mg for 4 weeks then 0.5 mg weekly for 4 weeks.  - Semaglutide-Weight Management (WEGOVY) 0.5 MG/0.5ML pen  Dispense: 4 mL; Refill: 1  - TSH with free T4 reflex    Tobacco use disorder  Previously stopped using tobacco which he chews at this time using bupropion and is like to try this again.  - buPROPion (ZYBAN) 150 MG 12 hr tablet  Dispense: 60 tablet; Refill: 2    Right-sided low back pain with right-sided sciatica, unspecified chronicity  Recurrent low back pain that is radiating down his thigh to the knee at times and he like AN epidural injection that previously worked well at the pain clinic:  - Pain Management  Referral    Erectile dysfunction, unspecified erectile dysfunction type  Erectile dysfunction symptoms and he like to take Viagra, risks and benefits discussed.  - sildenafil (VIAGRA) 50 MG tablet  Dispense: 30 tablet; Refill: 11      Assessment & Plan     Hypertension  - Continue current medication. Monitor blood pressure regularly.    Weight loss  - Recommended dietary changes  "and exercise. Consideration of semaglutide injections to suppress appetite.    Irritable bowel syndrome  - Suggested reducing MiraLAX and maintaining fiber intake. Encouraged drinking plenty of water.    Tobacco use  - Encouraged patient to quit tobacco use. Patient has previously used Bupropion to quit.    Cold sores  - Suggested switching to Valacyclovir, to be taken at the onset of a cold sore instead of daily.    Erectile dysfunction  - Prescribed Viagra. Advised patient to take 30 minutes before sexual activity.    Back Pain  - Referred patient for cortisone injection for back pain relief.       BMI  Estimated body mass index is 33.13 kg/m  as calculated from the following:    Height as of this encounter: 1.88 m (6' 2\").    Weight as of this encounter: 117 kg (258 lb).     Reviewed preventive health counseling, as reflected in patient instructions    No follow-ups on file.    Follow-up Visit   Expected date: Jun 11, 2024      Follow Up Appointment Details:     Follow-up with whom?: Other Primary Care Services    Follow-Up for what?: Lab Visit    How?: In Person             Follow-up Visit   Expected date:  Jun 11, 2025 (Approximate)      Follow Up Appointment Details:     Follow-up with whom?: PCP    Follow-Up for what?: Adult Preventive    Any Additional Chronic Condition Management?: Hypertension    How?: In Person                         Rosas Mg MD     [address] [address]  [email or web address]     Office: [phone]         Subjective   Wilian is a 48 year old, presenting for the following:  Physical        6/11/2024     2:58 PM   Additional Questions   Roomed by Martine LEMOS        Health Care Directive  Patient does not have a Health Care Directive or Living Will: Discussed advance care planning with patient; information given to patient to review.    HPI    Blood Pressure Concerns. Patient's name is Wilian Mckeon, a 48-year-old male. Consultation was " primarily focused on concerns about blood pressure. Patient reported taking Hydrochlorothiazide for blood pressure management. No reported leg cramps or similar issues.     Weight Loss. Patient expressed concern about weight loss and acknowledged the need to make changes in diet and exercise habits. He works 16 hours a day, leaving little time for gym visits. Patient expressed interest in trying semaglutide injections for weight loss.     Irritable Bowel Syndrome. Patient reported having to take MiraLAX and two fiber tablets every morning to avoid constipation. Patient experiences bowel movements four or five times a day.    Erectile Dysfunction. Patient reported experiencing signs of erectile dysfunction and expressed interest in trying Viagra.     Tobacco Use. Patient reported using tobacco in the form of chewing, with a history of on-and-off usage for approximately 20 years. Patient has previously used Bupropion to quit tobacco use and expressed interest in trying it again.    Cold Sores. Patient reported taking acyclovir daily for cold sores, which occur approximately once every three months. Patient expressed interest in trying Valaciclovir as an alternative to daily acyclovir use.     Back Pain. Patient reported back pain, particularly on the right side around the L4 and L5 area. Pain extends down the back of the leg. Patient requested another cortisone injection for back pain relief.     Other Concerns. Patient reported occasional blood in stool, believed to be related to hemorrhoids.     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 However, he clarified that his alcohol consumption may not be as high as it appears due to the nature of his work. Patient reported drinking sugary drinks such as coke once or twice a week. Patient reported feeling tired often and not sleeping well at night. He snores periodically but is unsure if he stops breathing at night.      Patient reported soreness in ankles and feet at the end of the day, which is alleviated by Hydrochlorothiazide.       Hypertension Follow-up    Do you check your blood pressure regularly outside of the clinic? No   Are you following a low salt diet? No  Are your blood pressures ever more than 140 on the top number (systolic) OR more   than 90 on the bottom number (diastolic), for example 140/90? No        6/6/2024   General Health   How would you rate your overall physical health? (!) FAIR   Feel stress (tense, anxious, or unable to sleep) Rather much   (!) STRESS CONCERN      6/6/2024   Nutrition   Three or more servings of calcium each day? (!) NO   Diet: Carbohydrate counting    Breakfast skipped   How many servings of fruit and vegetables per day? (!) 0-1   How many sweetened beverages each day? 0-1         6/6/2024   Exercise   Days per week of moderate/strenous exercise 2 days   Average minutes spent exercising at this level 30 min   (!) EXERCISE CONCERN      6/6/2024   Social Factors   Frequency of gathering with friends or relatives Once a week   Worry food won't last until get money to buy more No   Food not last or not have enough money for food? No   Do you have housing?  Yes   Are you worried about losing your housing? No   Lack of transportation? No   Unable to get utilities (heat,electricity)? No         6/6/2024   Dental   Dentist two times every year? Yes         6/6/2024   TB Screening   Were you born outside of the [place]? No         Today's PHQ-2 Score:       6/11/2024     2:44 PM   PHQ-2 ( 1999 [employer])   Q1: Little interest or pleasure in doing things 0   Q2: Feeling down, depressed or hopeless 0   PHQ-2 Score 0   Q1: Little interest or pleasure in doing things Not at all   Q2: Feeling down, depressed or hopeless Not at all   PHQ-2 Score 0           6/6/2024   Substance Use   Alcohol more than 3/day or more than 7/wk Yes   How often do you have a drink containing alcohol 4 or more times a week    How many alcohol drinks on typical day 1 or 2   How often do you have 5+ drinks at one occasion Less than monthly   Audit 2/3 Score 1   How often not able to stop drinking once started Never   How often failed to do what normally expected Never   How often needed first drink in am after a heavy drinking session Never   How often feeling of guilt or remorse after drinking Never   How often unable to remember what happened the night before Never   Have you or someone else been injured because of your drinking No   Has anyone been concerned or suggested you cut down on drinking No   TOTAL SCORE - AUDIT 5   Do you use any other substances recreationally? No     Social History     Tobacco Use    Smoking status: Former     Current packs/day: 0.00     Types: Cigarettes     Quit date:      Years since quittin.    Smokeless tobacco: Former     Types: Chew     Quit date:     Tobacco comments:     quit smoking in early  and quit chew in    Vaping Use    Vaping status: Never Used   Substance Use Topics    Alcohol use: Yes     Alcohol/week: 2.0 standard drinks of alcohol     Types: 2 Standard drinks or equivalent per week     Comment: occa    Drug use: No           2024   STI Screening   New sexual partner(s) since last STI/HIV test? No   ASCVD Risk   The 10-year ASCVD risk score (Carine MORRIS, et al., 2019) is: 3.1%    Values used to calculate the score:      Age: 48 years      Sex: Male      Is Non- : No      Diabetic: No      Tobacco smoker: No      Systolic Blood Pressure: 122 mmHg      Is BP treated: Yes      HDL Cholesterol: 49 mg/dL      Total Cholesterol: 216 mg/dL        2024   Contraception/Family Planning   Questions about contraception or family planning No        Reviewed and updated as needed this visit by Provider   Tobacco  Allergies  Meds  Problems  Med Hx  Surg Hx  Fam Hx             Objective    Exam  /84   Pulse 89   Temp 98.1  F  "(36.7  C)   Resp 18   Ht 1.88 m (6' 2\")   Wt 117 kg (258 lb)   SpO2 100%   BMI 33.13 kg/m     Estimated body mass index is 33.13 kg/m  as calculated from the following:    Height as of this encounter: 1.88 m (6' 2\").    Weight as of this encounter: 117 kg (258 lb).    Physical Exam  GENERAL: healthy, alert and no distress  EYES: Eyes grossly normal to inspection, PERRL and conjunctivae and sclerae normal  HENT: ear canals and TM's normal, nose and mouth without ulcers or lesions  NECK: no adenopathy, no asymmetry, masses, or scars and thyroid normal to palpation  RESP: lungs clear to auscultation - no rales, rhonchi or wheezes  BREAST: normal without masses, tenderness or nipple discharge and no palpable axillary masses or adenopathy  CV: regular rate and rhythm, normal S1 S2, no S3 or S4, no murmur, click or rub, no peripheral edema and peripheral pulses strong  ABDOMEN: soft, nontender, no hepatosplenomegaly, no masses and bowel sounds normal   (male): normal male genitalia without lesions or urethral discharge, no hernia  MS: no gross musculoskeletal defects noted, no edema  SKIN: no suspicious lesions or rashes  NEURO: Normal strength and tone, mentation intact and speech normal  PSYCH: mentation appears normal, affect normal/bright  LYMPH: no cervical, supraclavicular, axillary, or inguinal adenopathy  RECTAL: declined exam    Signed Electronically by: Charly Mg MD    "

## 2024-06-11 NOTE — PATIENT INSTRUCTIONS
"Preventive Care Advice   This is general advice we often give to help people stay healthy. Your care team may have specific advice just for you. Please talk to your care team about your own preventive care needs.  Lifestyle  Exercise at least 150 minutes each week (30 minutes a day, 5 days a week).  Do muscle strengthening activities 2 days a week. These help control your weight and prevent disease.  No smoking.  Wear sunscreen to prevent skin cancer.  Have your home tested for radon every 2 to 5 years. Radon is a colorless, odorless gas that can harm your lungs. To learn more, go to www.health.[facility].mn. and search for \"Radon in Homes.\"  Keep guns unloaded and locked up in a safe place like a safe or gun vault, or, use a gun lock and hide the keys. Always lock away bullets separately. To learn more, visit truedash.mn.gov and search for \"safe gun storage.\"  Nutrition  Eat 5 or more servings of fruits and vegetables each day.  Try wheat bread, brown rice and whole grain pasta (instead of white bread, rice, and pasta).  Get enough calcium and vitamin D. Check the label on foods and aim for 100% of the RDA (recommended daily allowance).  Regular exams  Have a dental exam and cleaning every 6 months.  See your health care team every year to talk about:  Any changes in your health.  Any medicines your care team has prescribed.  Preventive care, family planning, and ways to prevent chronic diseases.  Shots (vaccines)   HPV shots (up to age 26), if you've never had them before.  Hepatitis B shots (up to age 59), if you've never had them before.  COVID-19 shot: Get this shot when it's due.  Flu shot: Get a flu shot every year.  Tetanus shot: Get a tetanus shot every 10 years.  Pneumococcal, hepatitis A, and RSV shots: Ask your care team if you need these based on your risk.  Shingles shot (for age 50 and up).  General health tests  Diabetes screening:  Starting at age 35, Get screened for diabetes at least every 3 years.  If " you are younger than age 35, ask your care team if you should be screened for diabetes.  Cholesterol test: At age 39, start having a cholesterol test every 5 years, or more often if advised.  Bone density scan (DEXA): At age 50, ask your care team if you should have this scan for osteoporosis (brittle bones).  Hepatitis C: Get tested at least once in your life.  Abdominal aortic aneurysm screening: Talk to your doctor about having this screening if you:  Have ever smoked; and  Are biologically male; and  Are between the ages of 65 and 75.  STIs (sexually transmitted infections)  Before age 24: Ask your care team if you should be screened for STIs.  After age 24: Get screened for STIs if you're at risk. You are at risk for STIs (including HIV) if:  You are sexually active with more than one person.  You don't use condoms every time.  You or a partner was diagnosed with a sexually transmitted infection.  If you are at risk for HIV, ask about PrEP medicine to prevent HIV.  Get tested for HIV at least once in your life, whether you are at risk for HIV or not.  Cancer screening tests  Cervical cancer screening: If you have a cervix, begin getting regular cervical cancer screening tests at age 21. Most people who have regular screenings with normal results can stop after age 65. Talk about this with your provider.  Breast cancer scan (mammogram): If you've ever had breasts, begin having regular mammograms starting at age 40. This is a scan to check for breast cancer.  Colon cancer screening: It is important to start screening for colon cancer at age 45.  Have a colonoscopy test every 10 years (or more often if you're at risk) Or, ask your provider about stool tests like a FIT test every year or Cologuard test every 3 years.  To learn more about your testing options, visit: www.[employer]/377035.pdf.  For help making a decision, visit: [email or web address].  Prostate cancer screening test: If you have a prostate and are age 55  to 69, ask your provider if you would benefit from a yearly prostate cancer screening test.  Lung cancer screening: If you are a current or former smoker age 50 to 80, ask your care team if ongoing lung cancer screenings are right for you.  For informational purposes only. Not to replace the advice of your health care provider. Copyright   2023 [employer]. All rights reserved. Clinically reviewed by the [facility] Transitions Program. [employer] [id number] - REV 04/24.  Learning About Stress  What is stress?     Stress is your body's response to a hard situation. Your body can have a physical, emotional, or mental response. Stress is a fact of life for most people, and it affects everyone differently. What causes stress for you may not be stressful for someone else.  A lot of things can cause stress. You may feel stress when you go on a job interview, take a test, or run a race. This kind of short-term stress is normal and even useful. It can help you if you need to work hard or react quickly. For example, stress can help you finish an important job on time.  Long-term stress is caused by ongoing stressful situations or events. Examples of long-term stress include long-term health problems, ongoing problems at work, or conflicts in your family. Long-term stress can harm your health.  How does stress affect your health?  When you are stressed, your body responds as though you are in danger. It makes hormones that speed up your heart, make you breathe faster, and give you a burst of energy. This is called the fight-or-flight stress response. If the stress is over quickly, your body goes back to normal and no harm is done.  But if stress happens too often or lasts too long, it can have bad effects. Long-term stress can make you more likely to get sick, and it can make symptoms of some diseases worse. If you tense up when you are stressed, you may develop neck, shoulder, or low back pain. Stress is  linked to high blood pressure and heart disease.  Stress also harms your emotional health. It can make you kemp, tense, or depressed. Your relationships may suffer, and you may not do well at work or school.  What can you do to manage stress?  You can try these things to help manage stress:   Do something active. Exercise or activity can help reduce stress. Walking is a great way to get started. Even everyday activities such as housecleaning or yard work can help.  Try yoga or tran chi. These techniques combine exercise and meditation. You may need some training at first to learn them.  Do something you enjoy. For example, listen to music or go to a movie. Practice your hobby or do volunteer work.  Meditate. This can help you relax, because you are not worrying about what happened before or what may happen in the future.  Do guided imagery. Imagine yourself in any setting that helps you feel calm. You can use online videos, books, or a teacher to guide you.  Do breathing exercises. For example:  From a standing position, bend forward from the waist with your knees slightly bent. Let your arms dangle close to the floor.  Breathe in slowly and deeply as you return to a standing position. Roll up slowly and lift your head last.  Hold your breath for just a few seconds in the standing position.  Breathe out slowly and bend forward from the waist.  Let your feelings out. Talk, laugh, cry, and express anger when you need to. Talking with supportive friends or family, a counselor, or a jasmin leader about your feelings is a healthy way to relieve stress. Avoid discussing your feelings with people who make you feel worse.  Write. It may help to write about things that are bothering you. This helps you find out how much stress you feel and what is causing it. When you know this, you can find better ways to cope.  What can you do to prevent stress?  You might try some of these things to help prevent stress:  Manage your time.  "This helps you find time to do the things you want and need to do.  Get enough sleep. Your body recovers from the stresses of the day while you are sleeping.  Get support. Your family, friends, and community can make a difference in how you experience stress.  Limit your news feed. Avoid or limit time on social media or news that may make you feel stressed.  Do something active. Exercise or activity can help reduce stress. Walking is a great way to get started.    Where can you learn more?  Go to https://www.[employer].net/patiented  Enter N032 in the search box to learn more about \"Learning About Stress.\"  Current as of: October 24, 2023               Content Version: 14.0    1465-2144 [employer].   Care instructions adapted under license by your healthcare professional. If you have questions about a medical condition or this instruction, always ask your healthcare professional. [employer] disclaims any warranty or liability for your use of this information.        Eat Better ? Move More ? Live Well     Eat 3 nutrient-rich meals each day    Don t skip meals--it will cause you to overeat later in the day!    Eating fiber (vegetables/fruits/whole grains) and protein with meals helps you stay full longer    Choose foods with less than 10 grams of sugar and 5 grams of fat per serving to prevent excess calories and weight re-gain  Eat around the same times each day to develop a routine eating schedule   Avoid snacking unless physically hungry.   Planned snacks: 1-2 times per day and no more than 150 calories    Eat protein first   Protein helps with healing, maintaining adequate muscle mass, reducing hunger and optimizing nutritional status   Aim for 60-80 grams of protein per day   Fill up on Fiber   Fiber comes from plants--fruits, veggies, whole grains, nuts/seeds and beans   Fiber is low in calories, high in phytonutrients and helps you stay full longer   Aim for 25-35 grams per day by " eating fiber with meals and snacks  Eat S-[id number]-L-[id number]   Take 20-30 minutes to eat each meal by taking small bites, chewing foods to applesauce consistency or 20-30 times before you swallow   Eating foods too fast can delay satiety/fullness signals and increase overeating   Slow down your eating by using toddler utensils, putting your fork/spoon down between bites and not watching TV or emailing during meals!   Keep a Journal         Writing down what you eat, how you feel and when you are active helps you identify new changes to work on from week to week         Look for ways to cut 100 calories from your current diet 2-3 times per day  Drink 64 ounces of 0-Calorie drinks between meals   Water   Zero calorie Propel  or Vitamin Water     [employer]  Zero Calories   Crystal Light , Sugar-Free Husam-Aid , and other sugar-free lemonade or flavored garsia   Keep Caffeine to less than 300mg per day ie: 3-6oz cups coffee      Work up to 45-60 minutes of physical activity most days of the week   Helps with losing weight and prevent regaining those extra pounds!    Do a combo of cardio (walking/water exercises) and strength training (lifting weights/Vinyasa yoga)     Avoid Mindless Eating   Be present when you eat--take note of the smell, taste and quality of your food   Make a list of alternative activities you could do to prevent eating out of boredom/stress  Go for a walk, call a friend, chew gum, paint your nails, re-organize the garage, etc        Semaglutide that is available through the [facility]    Currently, we offer prefilled syringes in 0.25mg. 0.5mg, 1mg, 1.5mg, 2mg, and 2.5mg (we will dispense both needles and the drug, no need for an extra rx)  Recommend ordering  compound noncontrolled  and put the drug and strength in the sig (not the notes, those drop off with refills)  Please instruct your patients on how to self-inject in advance  Keep it in the fridge, we can dispense 1 month (4  syringes) at a time  Prefilled Syringes Pricing #4 each (1month):  0.25mg ~$222  0.5mg ~$260  1mg ~$306  1.5mg ~$342  2mg ~$395  2.5mg ~$438  FCP Compounded Semaglutide [email or web address]

## 2024-06-12 ENCOUNTER — TELEPHONE (OUTPATIENT)
Dept: PALLIATIVE MEDICINE | Facility: OTHER | Age: 49
End: 2024-06-12
Payer: COMMERCIAL

## 2024-06-12 DIAGNOSIS — M54.16 LUMBAR RADICULOPATHY: Primary | ICD-10-CM

## 2024-06-12 LAB
CREAT UR-MCNC: 51.8 MG/DL
MICROALBUMIN UR-MCNC: <12 MG/L
MICROALBUMIN/CREAT UR: NORMAL MG/G{CREAT}

## 2024-06-12 NOTE — TELEPHONE ENCOUNTER
"Injection Order (copy and paste all info under \"order questions\" section:   My clinical question is:         Reason for Referral:   Procedure Order            Procedure:   Injection to be Determined by Pain Management Specialist            Scheduling Instructions:   [facility] will call you to coordinate care as prescribed your provider. If you don t hear from a representative within 2 business days, please call [phone].            Additional Information     Associated Diagnosis: Right-sided low back pain with right-sided sciatica, unspecified chronicity [M54...   Referring Provider: Charly Mg MD in  FAMILY PRACTICE   Injection History (previous injections with pain, type and date): Anibal    After Review please route to Pain Nurse Pool for nursing to triage.            Mahesh Zheng  Complex    [facility]  Pain Management     "

## 2024-06-12 NOTE — TELEPHONE ENCOUNTER
Preferred clinic location: [facility] with Dr. Armstrong    Injection TBD order for dx of: Right-sided low back pain with right-sided sciatica    Per the MRI on 9/21/21:  IMPRESSION:    1. Degenerative change as detailed, worst at L5-S1.  2. Disc bulge at L4-L5 with central annular fissure.     Imaging over 3 years old?  No  If so are there any change in symptoms since the imaging was done/in the past? No, patient reports that his pain is now returning and he is aware that he should get in for an injection as soon as possible in order to avoid any further issues such as radiating pain or numbness and tingling.    Injection hx    Date of Visit: 8/10/2023  Procedure performed: Lumbar L4-L5 interlaminar epidural steroid injection  Diagnosis: Lumbar spondylosis; Lumbar radiculitis/radiculopathy  : Gricelda Armstrong MD  Anesthesia: none  Any benefit from previous injection, pain relief lasted almost 9 months with full or 100% relief  Are symptoms the same as those experienced prior to last injection? yes  Pain location: right lumbar L4-5  Any radiating pain: No  Numbness/tingling? No  Weakness?:  No  Bowel/bladder?   Unsure, patient reports thinking  that there may be a correlation to some loss of full bowel control at times       Routed to interventionists to review and determine injection.    *Providers*    Please do not place additional orders.  Order is not needed for TBDs.   Route all correspondence to P Pain Nurse Lexa ([phone])  Do not route to individual staff    ISRA BLOOD RN

## 2024-06-13 NOTE — TELEPHONE ENCOUNTER
"Screening Questions for Radiology Injections:    Injection to be done at which interventional clinic site? [facility]    If choosing [facility] for location, please inform patient:  \"[facility] is a Hospital based clinic. Before your visit, you should check with your insurance about how it covers the charges for facility services in a hospital-based clinic.     Procedure ordered by Saurav    Procedure ordered? Repeat L4-5 ILESI   Transforaminal Cervical MIGDALIA - Send to [facility] ([facility]) - No [facility] Site providers perform this procedure    What insurance would patient like us to bill for this procedure? [employer] C[employer]  IF SCHEDULING IN [place] PAIN OR SPINE PLEASE SCHEDULE AT LEAST 7-10 BUSINESS DAYS OUT SO A PA CAN BE OBTAINED  Worker's comp or MVA (motor vehicle accident) -Any injection DO NOT SCHEDULE and route to Mahesh Zheng.    [employer] insurance - For SI joint injections, DO NOT SCHEDULE and route to Celestina Nova.     ALL BCBS, Humana and [employer] CIGNA - DO NOT SCHEDULE and route to Celestina Nova  MEDICA- ALL INJECTIONS- route to Celestina Nova    Is patient scheduled at [facility]? no   If YES, route every encounter to [facility] CARE NAVIGATION POOL [[phone]]    Is an  needed? No     Patient has a  home? (Review Grid) YES: ok    Any chance of pregnancy? NO   If YES, do NOT schedule and route to RN pool  - Dr. Cleaning route to Tiffanie Mills and PM&R Nurse  [[id number]]      Is patient actively being treated for cancer or immunocompromised? No  If YES, do NOT schedule and route to RN pool/ Dr. Cleaning's Team    Does the patient have a bleeding or clotting disorder? No   If YES, okay to schedule AND route to RN nurse / Dr. Cleaning's Team   (For any patients with platelet count <100, RN must forward to provider)    Is patient taking any Blood Thinners OR Antiplatelet medication?  No   If hold needed, do NOT schedule, route to LAMBERT hernández/ Dr. Cleaning's " Team  Examples:   Blood Thinners: (Coumadin, Warfarin, Jantoven, Pradaxa, Xarelto, Eliquis, Edoxaban, Enoxaparin, Lovenox, Heparin, Arixtra, Fondaparinux or Fragmin)  Antiplatelet Medications: (Plavix, Brilinta or Effient)     Is patient taking any aspirin products (includes Excedrin and Fiorinal)? No   If yes route to RN pool/ Dr. Valentins Team - Do not schedule      Any allergies to contrast dye, iodine, shellfish, or numbing and steroid medications? No  If YES, schedule and add allergy information to appointment notes AND route to the RN pool/ Dr. Cleaning's Team  If MIGDALIA and Contrast Dye / Iodine Allergy? DO NOT SCHEDULE, route to RN pool/ Dr. Valentins Team  Allergies: Seasonal allergies     Does patient have an active infection or treated for one within the past week? No  Is patient currently taking any antibiotics or steroid medications?  No   For patients on chronic, preventative, or prophylactic antibiotics, procedures may be scheduled.   For patients on antibiotics for active or recent infection, schedule 4 days after completed.  For patients on steroid medications, schedule 4 days after completed.     Has the patient had a flu shot or any other vaccinations within the past 7 days? No  If yes, explain that for the vaccine to work best they need to:     wait 1 week before and 1 week after getting any Vaccine  wait 1 week before and 2 weeks after getting any Covid Vaccine   If patient has concerns about the timing, send to RN pool/ Dr. Valentins Team    Does patient have an MRI/CT?  YES: MRI Include Date and Check Procedure Scheduling Grid to see if required.  Was the MRI/CT done within the last 3 years?  Yes   If no route to RN Pool/ Dr. Valentins Team  If yes, where was the MRI/CT done? [facility]   Refer to PACS Transmissions list for approved external locations and route to RN Pool High Priority/ Dr. Valentins Team  If MRI was not done at approved external location do NOT schedule and route to RN pool/   [facility] Team    If patient has an imaging disc, the injection MAY be scheduled but patient must bring disc to appt or appt will be cancelled.    Is patient able to transfer to a procedure table with minimal or no assistance? Yes   If no, do NOT schedule and route to RN Pool/ Dr. Cleaning's Team    Procedure Specific Instructions:  If celiac plexus block, informed patient NPO for 6 hours and that it is okay to take medications with sips of water, especially blood pressure medications Not Applicable       If this is for a cervical procedure, informed patient that aspirin needs to be held for 6 days.   Not Applicable    Sedation, If Sedation is ordered for any procedure, patient must be NPO for 6 hours prior to procedure Not Applicable    If IV needed:  Do not schedule procedures requiring IV placement in the first appointment of the day or first appointment after lunch. Do NOT schedule at 0745, 0815 or 1245. ok  Instructed patient to arrive 30 minutes early for IV start if required. (Check Procedure Scheduling Grid)  Not Applicable    Reminders:  If you are started on any steroids or antibiotics between now and your appointment, you must contact us because the procedure may need to be cancelled.  Yes    As a reminder, receiving steroids can decrease your body's ability to fight infection.   Would you still like to move forward with scheduling the injection?  Yes    IV Sedation is not provided for procedures. If oral anti-anxiety medication is needed, the patient should request this from their referring provider.    Instruct patient to arrive as directed prior to the scheduled appointment time:  If IV needed 30 minutes before appointment time     For patients 85 or older we recommend having an adult stay w/ them for the remainder of the day.     If the patient is Diabetic, remind them to bring their glucometer.      Does the patient have any questions?  NO  Kenisha Zheng  [facility]

## 2024-06-13 NOTE — RESULT ENCOUNTER NOTE
Dear Wilian,    Here is a summary of your recent test results:  -Microalbumin (urine protein) test is normal.  ADVISE: rechecking this annually.    For additional lab test information, www.[email or web address] is a very good reference.    In addition, here is a list of due or overdue Health Maintenance reminders:  Comprehensive Metabolic Panel due on 05/18/2024  Cholesterol Lab due on 05/18/2024    Please call us at [phone] (or use [employer]) to address the above recommendations if needed.           Thank you very much for trusting me and [facility].     Have a peaceful day.    Healthy regards,  Rosas Mg MD

## 2024-06-13 NOTE — TELEPHONE ENCOUNTER
Lvm to schedule   Repeat L4-5 ILESI 8/10/2023             Mahesh Norm  Complex   [facility]  Pain Management

## 2024-06-14 NOTE — TELEPHONE ENCOUNTER
PA approved.  Effective date: 6/14/24-12/11/24  PA reference #: [id number]  Pt. notified:   OKAY TO SCHEDULE ILESI WITH DR. TIFFANY Loaiza   [place] [facility]

## 2024-06-17 NOTE — TELEPHONE ENCOUNTER
LVM to schedule  Repeat L4-5 ILESI           Mahesh Zheng  Complex   [facility]  Pain Management

## 2024-06-26 ENCOUNTER — LAB (OUTPATIENT)
Dept: LAB | Facility: CLINIC | Age: 49
End: 2024-06-26
Payer: COMMERCIAL

## 2024-06-26 DIAGNOSIS — E66.9 OBESITY WITH SERIOUS COMORBIDITY, UNSPECIFIED CLASSIFICATION, UNSPECIFIED OBESITY TYPE: ICD-10-CM

## 2024-06-26 DIAGNOSIS — I10 HYPERTENSION GOAL BP (BLOOD PRESSURE) < 130/80: ICD-10-CM

## 2024-06-26 DIAGNOSIS — E78.5 HYPERLIPIDEMIA LDL GOAL <130: ICD-10-CM

## 2024-06-26 LAB
ALBUMIN SERPL BCG-MCNC: 4.1 G/DL (ref 3.5–5.2)
ALP SERPL-CCNC: 61 U/L (ref 40–150)
ALT SERPL W P-5'-P-CCNC: 18 U/L (ref 0–70)
ANION GAP SERPL CALCULATED.3IONS-SCNC: 7 MMOL/L (ref 7–15)
AST SERPL W P-5'-P-CCNC: 21 U/L (ref 0–45)
BILIRUB SERPL-MCNC: 0.6 MG/DL
BUN SERPL-MCNC: 9.9 MG/DL (ref 6–20)
CALCIUM SERPL-MCNC: 9 MG/DL (ref 8.6–10)
CHLORIDE SERPL-SCNC: 101 MMOL/L (ref 98–107)
CHOLEST SERPL-MCNC: 181 MG/DL
CREAT SERPL-MCNC: 1.17 MG/DL (ref 0.67–1.17)
DEPRECATED HCO3 PLAS-SCNC: 27 MMOL/L (ref 22–29)
EGFRCR SERPLBLD CKD-EPI 2021: 77 ML/MIN/1.73M2
FASTING STATUS PATIENT QL REPORTED: YES
FASTING STATUS PATIENT QL REPORTED: YES
GLUCOSE SERPL-MCNC: 83 MG/DL (ref 70–99)
HDLC SERPL-MCNC: 42 MG/DL
LDLC SERPL CALC-MCNC: 120 MG/DL
NONHDLC SERPL-MCNC: 139 MG/DL
POTASSIUM SERPL-SCNC: 3.7 MMOL/L (ref 3.4–5.3)
PROT SERPL-MCNC: 6.2 G/DL (ref 6.4–8.3)
SODIUM SERPL-SCNC: 135 MMOL/L (ref 135–145)
TRIGL SERPL-MCNC: 94 MG/DL
TSH SERPL DL<=0.005 MIU/L-ACNC: 4.12 UIU/ML (ref 0.3–4.2)

## 2024-06-26 PROCEDURE — 36415 COLL VENOUS BLD VENIPUNCTURE: CPT

## 2024-06-26 PROCEDURE — 80061 LIPID PANEL: CPT

## 2024-06-26 PROCEDURE — 80053 COMPREHEN METABOLIC PANEL: CPT

## 2024-06-26 PROCEDURE — 84443 ASSAY THYROID STIM HORMONE: CPT

## 2024-06-27 NOTE — RESULT ENCOUNTER NOTE
Dear Wilian,    Here is a summary of your recent test results:  -Lipid panel: The ASCVD risk score returns the percentage likelihood of a first time Atherosclerotic Cardiovascular Disease (ASCVD) event.    The 10-year ASCVD risk score (Carine MORRIS, et al., 2019) is: 2.8%    Values used to calculate the score:      Age: 48 years      Sex: Male      Is Non- : No      Diabetic: No      Tobacco smoker: No      Systolic Blood Pressure: 122 mmHg      Is BP treated: Yes      HDL Cholesterol: 42 mg/dL      Total Cholesterol: 181 mg/dL    -2.8% of patients that have a similar cholesterol profile to you will have a stroke, heart attack or death (related to heart disease) within the next 10 years and that is considered a low risk and cholesterol lowering medications are not  recommended at this time (high risk is >10%, or >7.5% if other risk factors such has high blood pressure or other heart disease risk factors).    -LDL(bad) cholesterol level is elevated which can increase your heart disease risk.  A diet high in fat and simple carbohydrates, genetics and being overweight can contribute to this. ADVISE: exercising 150 minutes of aerobic exercise per week (30 minutes for 5 days per week or 50 minutes for 3 days per week are options) and eating a low saturated fat/low carbohydrate diet are helpful to improve this.  -Liver and gallbladder tests are normal (ALT,AST, Alk phos, bilirubin), kidney function is normal (Cr, GFR), sodium is normal, potassium is normal, calcium is normal, glucose is normal.  -TSH (thyroid stimulating hormone) level is normal which indicates normal thyroid function.  Follow    For additional lab test information, www.[email or web address].com is a very good reference.           Thank you very much for trusting me and [facility].     Have a peaceful day.    Healthy regards,  Rosas Mg MD

## 2024-07-23 NOTE — PROGRESS NOTES
[facility] - Procedure Note    Date of Visit: 7/24/2024    Procedure performed: Lumbar L4-L5 interlaminar epidural steroid injection  Diagnosis: Lumbar spondylosis; Lumbar radiculitis/radiculopathy  : Gricelda Armstrong MD  Anesthesia: none    Indications: Paul Mckeon is a 48 year old male who is seen at the request of Dr. Mg for a lumbar epidural steroid injection. The patient describes bilateral low back pain with radiation to the upper buttock. The patient has been exhibiting symptoms consistent with lumbar intraspinal inflammation and radiculopathy. Symptoms have been persistent, disabling, and intermittently severe. The patient reports minimal improvement with conservative treatment, including chiropractic, ice/heat, massage, PT and medications.    Previous L5-S1 ILESI done by myself on 8/10/2023 and by Dr. Villarreal on 11/8/2021 provided good pain relief for a period of 12 months.       MRI of the LUMBAR SPINE was done on 9/29/2021 which showed:   L4-L5:  Disc height loss. Disc bulge with central annular fissure.  Mild bilateral facet arthropathy. No right foraminal stenosis. Mild  left foraminal stenosis. No spinal canal stenosis.       L5-S1:  Severe disc height loss. Presumed degenerative disc T2  hyperintensity, right worse than left. Disc bulge, slightly symmetric  to the right with broad central bulge versus protrusion component  which does not appear to impinge upon the traversing S1 nerve roots  within the lateral recesses. Mild bilateral facet arthropathy.  Moderate to severe bilateral foraminal stenosis. No spinal canal  stenosis.                                                                   IMPRESSION:    1. Degenerative change as detailed, worst at L5-S1.  2. Disc bulge at L4-L5 with central annular fissure.     Allergies:      Allergies   Allergen Reactions    Seasonal Allergies Headache        Vitals:  /84 (BP Location: Left arm, Patient Position:  Chair, Cuff Size: Adult Regular)   Pulse 83   SpO2 96%     Review of Systems: The patient denies recent fever, chills, illness, use of antibiotics or anticoagulants. All other 10-point review of systems negative.     Procedure: The procedure and risks were explained, and informed written consent was obtained from the patient. Risks include but are not limited to: infection, bleeding, increased pain, and damage to soft tissue, nerve, muscle, and vasculature structures. After getting informed consent, patient was brought into the procedure suite and was placed in a prone position on the procedure table. A Pause for the Cause was performed. Patient was prepped and draped in sterile fashion.     The L4-5 interspace was identified with use of fluoroscopy in AP view. A 25-gauge, 1.5 inch needle was used to anesthetize the skin and subcutaneous tissue entry site with a total of 2 ml of 1% lidocaine. Under fluoroscopic visualization, a 22-gauge, 4.5 inch Tuohy epidural needle was slowly advanced towards the epidural space a few millimeters left of midline. The latter part of the needle advancement was guided with fluoroscopy in the lateral view. The epidural space was identified using loss of resistance technique. After negative aspiration for heme and cerebrospinal fluid, a total of 1 mL of non-ionic contrast was injected to confirm needle placement with 0 mL of contrast wasted. Epidurogram confirmed spread within the posterior epidural space. 2 ml of 40mg/ml of triamcinolone, 2 ml of 1% lidocaine, and 1 ml of preservative free saline was injected. The needle was removed.  Images were saved to PACS.    The patient tolerated the procedure well, and there was no evidence of procedural complications. No new sensory or motor deficits were noted following the procedure. The patient was stable and able to ambulate on discharge home. Post-procedure instructions were provided.     Pre-procedure pain score: 2/10 in the back, 0/10  in the leg  Post-procedure pain score: 0/10 in the back, 0/10 in the leg    Assessment/Plan: Paul Mckeon is a 48 year old male s/p lumbar interlaminar epidural steroid injection today for lumbar spondylosis and radiculitis/radiculopathy.     1. Following today's procedure, the patient was advised to contact the Pain Management Center for any of the following:   Fever, chills, or night sweats   New onset of pain, numbness, or weakness   Any questions/concerns regarding the procedure  If unable to contact the Pain Center, the patient was instructed to go to a local Emergency Room for any complications.   2. Follow-up with the referring provider in 2 weeks for post-procedure evaluation.    CARTER ALLEN MD   Pain Management

## 2024-07-24 ENCOUNTER — RADIOLOGY INJECTION OFFICE VISIT (OUTPATIENT)
Dept: PALLIATIVE MEDICINE | Facility: CLINIC | Age: 49
End: 2024-07-24
Payer: COMMERCIAL

## 2024-07-24 VITALS — SYSTOLIC BLOOD PRESSURE: 121 MMHG | OXYGEN SATURATION: 97 % | DIASTOLIC BLOOD PRESSURE: 89 MMHG | HEART RATE: 82 BPM

## 2024-07-24 DIAGNOSIS — M54.16 LUMBAR RADICULOPATHY: Primary | ICD-10-CM

## 2024-07-24 PROCEDURE — 62323 NJX INTERLAMINAR LMBR/SAC: CPT | Performed by: ANESTHESIOLOGY

## 2024-07-24 RX ORDER — TRIAMCINOLONE ACETONIDE 40 MG/ML
40 INJECTION, SUSPENSION INTRA-ARTICULAR; INTRAMUSCULAR ONCE
Status: COMPLETED | OUTPATIENT
Start: 2024-07-24 | End: 2024-07-24

## 2024-07-24 RX ADMIN — TRIAMCINOLONE ACETONIDE 40 MG: 40 INJECTION, SUSPENSION INTRA-ARTICULAR; INTRAMUSCULAR at 08:19

## 2024-07-24 RX ADMIN — TRIAMCINOLONE ACETONIDE 40 MG: 40 INJECTION, SUSPENSION INTRA-ARTICULAR; INTRAMUSCULAR at 08:20

## 2024-07-24 NOTE — PATIENT INSTRUCTIONS
[facility] Procedure Discharge Instructions    Today you saw:   Dr. Gricelda Armstrong     Your procedure:  Epidural steroid injection       Medications used:  Lidocaine (anesthetic)  Kenalog (steroid)  Omnipaque (contrast)    Saline       Be cautious when walking as numbness and/or weakness in the legs may occur up to 6-8 hours after the procedure due to effect of the local anesthetic  Do not drive for 6 hours. The effect of the local anesthetic could slow your reflexes.   Avoid strenuous activity for the first 24 hours. You may resume your regular activities after that.   You may shower, however avoid swimming, tub baths or hot tubs for 24 hours following your procedure  You may have a mild to moderate increase in pain for several days following the injection.    You may use ice packs for 10-15 minutes, 3 to 4 times a day at the injection site for comfort  Do not use heat to painful areas for 6 to 8 hours. This will give the local anesthetic time to wear off and prevent you from accidentally burning your skin.  Unless you have been directed to avoid the use of anti-inflammatory medications (NSAIDS-ibuprofen, Aleve, Motrin), you may use these medications or Tylenol for pain control if needed.   Possible side effects of steroids that you may experience include flushing, elevated blood pressure, increased appetite, mild headaches and restlessness.  All of these symptoms will get better with time.  It may take up to 14 days for the steroid medication to start working although you may feel the effect as early as a few days after the procedure.   Follow up with your referring provider (Dr Mg) in 2-3 weeks    If you experience any of the following, call the pain center line during work hours at [phone] or on-call physician after hours at [phone]:  -Fever over 100 degree F  -Swelling, bleeding, redness, drainage, warmth at the injection site  -Progressive weakness or numbness in your legs or  arms  -Loss of bowel or bladder function  -Unusual headache that is not relieved by Tylenol or your regular headache medication  -Unusual new onset of pain that is not improving

## 2024-07-24 NOTE — NURSING NOTE
Discharge Information    IV Discontiued Time:  NA    Amount of Fluid Infused:  NA    Discharge Criteria = When patient returns to baseline or as per MD order    Consciousness:  Pt is fully awake    Circulation:  BP +/- 20% of pre-procedure level    Respiration:  Patient is able to breathe deeply    O2 Sat:  Patient is able to maintain O2 Sat >92% on room air    Activity:  Moves 4 extremities on command    Ambulation:  Patient is able to stand and walk or stand and pivot into wheelchair    Dressing:  Clean/dry or No Dressing    Notes:   Discharge instructions and AVS given to patient    Patient meets criteria for discharge?  YES    Admitted to PCU?  No    Responsible adult present to accompany patient home?  Yes    Signature/Title:    Francesca Blackburn RN  RN Care Coordinator  [facility] [place]

## 2024-07-24 NOTE — NURSING NOTE
Pre-procedure Intake    If YES to any questions or NO to having a   Please complete laminated checklist and leave on the computer keyboard for Provider, verbally inform provider if able.    For SCS Trial, RFA's or any sedation procedure: Have you been fasting? NA  If yes, for how long?    NA     For any sedation procedure:  Do you take any GLP-1 Antagonist? NO   If yes, when did you take your last dose? N/A    Are you taking any any blood thinners such as Coumadin, Warfarin, Jantoven, Pradaxa Xarelto, Eliquis, Edoxaban, Enoxaparin, Lovenox, Heparin, Arixtra, Fondaparinux, or Fragmin? OR Antiplatelet medication such as Plavix, Brilinta, or Effient? N/A  If yes, when did you take your last dose?   NA     Do you take aspirin?   NO  If cervical procedure, have you held aspirin for 6 days?   NA    Do you have any allergies to contrast dye, iodine, steroid and/or numbing medications?  NO     Are you currently taking antibiotics or have an active infection?  NO     Have you had a fever/elevated temperature within the past week? NO     Are you currently taking oral steroids? NO     Do you have a ? Yes     Are you pregnant or breastfeeding? NA     Have you received any vaccines in the past 2 weeks? NO     Notify provider and RNs if systolic BP >170, diastolic BP >100, P >100 or O2 sats < 90%

## 2024-07-26 ENCOUNTER — OFFICE VISIT (OUTPATIENT)
Dept: FAMILY MEDICINE | Facility: CLINIC | Age: 49
End: 2024-07-26
Attending: FAMILY MEDICINE
Payer: COMMERCIAL

## 2024-07-26 VITALS
HEIGHT: 74 IN | WEIGHT: 236 LBS | TEMPERATURE: 98 F | OXYGEN SATURATION: 97 % | RESPIRATION RATE: 16 BRPM | SYSTOLIC BLOOD PRESSURE: 120 MMHG | HEART RATE: 87 BPM | DIASTOLIC BLOOD PRESSURE: 80 MMHG | BODY MASS INDEX: 30.29 KG/M2

## 2024-07-26 DIAGNOSIS — E66.9 OBESITY WITH SERIOUS COMORBIDITY, UNSPECIFIED CLASSIFICATION, UNSPECIFIED OBESITY TYPE: Primary | ICD-10-CM

## 2024-07-26 DIAGNOSIS — E78.5 HYPERLIPIDEMIA LDL GOAL <130: ICD-10-CM

## 2024-07-26 DIAGNOSIS — I10 HYPERTENSION GOAL BP (BLOOD PRESSURE) < 130/80: ICD-10-CM

## 2024-07-26 PROCEDURE — G2211 COMPLEX E/M VISIT ADD ON: HCPCS | Performed by: FAMILY MEDICINE

## 2024-07-26 PROCEDURE — 99214 OFFICE O/P EST MOD 30 MIN: CPT | Performed by: FAMILY MEDICINE

## 2024-07-26 SDOH — HEALTH STABILITY: PHYSICAL HEALTH: ON AVERAGE, HOW MANY DAYS PER WEEK DO YOU ENGAGE IN MODERATE TO STRENUOUS EXERCISE (LIKE A BRISK WALK)?: 2 DAYS

## 2024-07-26 ASSESSMENT — SOCIAL DETERMINANTS OF HEALTH (SDOH): HOW OFTEN DO YOU GET TOGETHER WITH FRIENDS OR RELATIVES?: ONCE A WEEK

## 2024-07-26 NOTE — PROGRESS NOTES
Assessment & Plan   Obesity with serious comorbidity, unspecified classification, unspecified obesity type  Has been on semeglutide 0.5mg weekly for 7 weeks. Down 20lbs. Feeling great and interested in increasing dose. Will increase to 1mg weekly.   -Semeglutide 1mg weekly injection sent to pharmacy    Hypertension  Controlled - continue medication.      Return in about 6 months (around 1/26/2025) for medication recheck, with a video visit.      Kimberly Nissen MS4,  has participated in the care of this patient.     Provider Disclosure:  I agree with above History, Review of Systems, Physical exam and Plan.  I have reviewed the content of the documentation and have edited it as needed. I have personally performed the services documented here and the documentation accurately represents those services and the decisions I have made.      Electronically signed by:          Rosas Mg M.D.       [address] [address]  [email or web address]   Office: [phone]       Subjective   Wilian is a 48 year old, presenting for the following health issues:  Recheck Medication    68-year-old  HPI       Medication Followup of Obesity on Semaglutide-Weight Management (WEGOVY) 0.5 MG/0.5ML pen   Taking Medication as prescribed: yes  Side Effects:  None  Medication Helping Symptoms:  yes      Patient is here to follow up on WEGOVY-patient says he is down 20 pounds and is feeling great!. Only side effects are some increased indigestion, burping, feeling full faster. Has had some loose stools but feels like it is manageable. Really excited about the weight loss. Reports that it has helped him get back on track with diet and exercise, is sleeping better now.     Patient had cortisone injection on Wednesday and blood pressure was improved as well.     Patient needs a refill to be picked up Sunday afternoon for his Wegovy since he is going out of town.         Review of  "Systems  Constitutional, HEENT, cardiovascular, pulmonary, GI, , musculoskeletal, neuro, skin, endocrine and psych systems are negative, except as otherwise noted.      Objective    /80   Pulse 87   Temp 98  F (36.7  C) (Tympanic)   Resp 16   Ht 1.88 m (6' 2\")   Wt 107 kg (236 lb)   SpO2 97%   BMI 30.30 kg/m    Body mass index is 30.3 kg/m .  Physical Exam   GENERAL: alert and no distress  RESP: lungs clear to auscultation - no rales, rhonchi or wheezes  CV: regular rate and rhythm, normal S1 S2, no S3 or S4, no murmur, click or rub, no peripheral edema  ABDOMEN: soft, nontender, no hepatosplenomegaly, no masses and bowel sounds normal  MS: no gross musculoskeletal defects noted, no edema  SKIN: no suspicious lesions or rashes    The longitudinal plan of care for the diagnosis(es)/condition(s) as documented were addressed during this visit. Due to the added complexity in care, I will continue to support Wilian in the subsequent management and with ongoing continuity of care.          Signed Electronically by: Charly Mg MD    "

## 2024-09-05 DIAGNOSIS — F17.200 TOBACCO USE DISORDER: ICD-10-CM

## 2024-09-05 RX ORDER — BUPROPION HYDROCHLORIDE 150 MG/1
150 TABLET, FILM COATED, EXTENDED RELEASE ORAL 2 TIMES DAILY
Qty: 60 TABLET | Refills: 2 | Status: SHIPPED | OUTPATIENT
Start: 2024-09-05

## 2024-10-21 ENCOUNTER — MYC REFILL (OUTPATIENT)
Dept: FAMILY MEDICINE | Facility: CLINIC | Age: 49
End: 2024-10-21
Payer: COMMERCIAL

## 2024-10-21 DIAGNOSIS — E66.9 OBESITY WITH SERIOUS COMORBIDITY, UNSPECIFIED CLASS, UNSPECIFIED OBESITY TYPE: ICD-10-CM

## 2024-10-21 DIAGNOSIS — B00.1 RECURRENT COLD SORES: ICD-10-CM

## 2024-10-21 DIAGNOSIS — I10 HYPERTENSION GOAL BP (BLOOD PRESSURE) < 130/80: Primary | ICD-10-CM

## 2024-10-22 RX ORDER — HYDROCHLOROTHIAZIDE 12.5 MG/1
12.5 TABLET ORAL DAILY
Qty: 90 TABLET | Refills: 4 | OUTPATIENT
Start: 2024-10-22

## 2024-10-22 RX ORDER — VALACYCLOVIR HYDROCHLORIDE 1 G/1
2000 TABLET, FILM COATED ORAL 2 TIMES DAILY
Qty: 12 TABLET | Refills: 4 | Status: SHIPPED | OUTPATIENT
Start: 2024-10-22

## 2024-10-22 NOTE — TELEPHONE ENCOUNTER
Dr. Mg, noted Valtrex prescription had an end date of 6/12/24. Please review and approve medication or deny medication and then close encounter.  Thanks!    Myc message sent to patient regarding how much weight he has lost.  Stated wt loss has stalled out.

## 2024-11-13 ENCOUNTER — MYC REFILL (OUTPATIENT)
Dept: FAMILY MEDICINE | Facility: CLINIC | Age: 49
End: 2024-11-13
Payer: COMMERCIAL

## 2024-11-13 DIAGNOSIS — E66.9 OBESITY WITH SERIOUS COMORBIDITY, UNSPECIFIED CLASS, UNSPECIFIED OBESITY TYPE: ICD-10-CM

## 2025-01-04 DIAGNOSIS — E66.9 OBESITY WITH SERIOUS COMORBIDITY, UNSPECIFIED CLASS, UNSPECIFIED OBESITY TYPE: ICD-10-CM

## 2025-01-06 RX ORDER — SEMAGLUTIDE 1.7 MG/.75ML
1.7 INJECTION, SOLUTION SUBCUTANEOUS WEEKLY
Qty: 9 ML | Refills: 1 | Status: SHIPPED | OUTPATIENT
Start: 2025-01-06

## 2025-03-22 ENCOUNTER — MYC REFILL (OUTPATIENT)
Dept: FAMILY MEDICINE | Facility: CLINIC | Age: 50
End: 2025-03-22
Payer: COMMERCIAL

## 2025-03-22 DIAGNOSIS — F17.200 TOBACCO USE DISORDER: ICD-10-CM

## 2025-03-22 DIAGNOSIS — E66.9 OBESITY WITH SERIOUS COMORBIDITY, UNSPECIFIED CLASS, UNSPECIFIED OBESITY TYPE: ICD-10-CM

## 2025-03-22 DIAGNOSIS — B00.1 RECURRENT COLD SORES: ICD-10-CM

## 2025-03-24 RX ORDER — SEMAGLUTIDE 1.7 MG/.75ML
1.7 INJECTION, SOLUTION SUBCUTANEOUS WEEKLY
Qty: 9 ML | Refills: 1 | OUTPATIENT
Start: 2025-03-24

## 2025-03-24 RX ORDER — BUPROPION HYDROCHLORIDE 150 MG/1
150 TABLET, FILM COATED, EXTENDED RELEASE ORAL 2 TIMES DAILY
Qty: 60 TABLET | Refills: 2 | Status: SHIPPED | OUTPATIENT
Start: 2025-03-24

## 2025-03-24 RX ORDER — VALACYCLOVIR HYDROCHLORIDE 1 G/1
2000 TABLET, FILM COATED ORAL 2 TIMES DAILY
Qty: 12 TABLET | Refills: 2 | Status: SHIPPED | OUTPATIENT
Start: 2025-03-24

## 2025-03-31 ENCOUNTER — MYC REFILL (OUTPATIENT)
Dept: FAMILY MEDICINE | Facility: CLINIC | Age: 50
End: 2025-03-31
Payer: COMMERCIAL

## 2025-03-31 DIAGNOSIS — E66.9 OBESITY WITH SERIOUS COMORBIDITY, UNSPECIFIED CLASS, UNSPECIFIED OBESITY TYPE: ICD-10-CM

## 2025-03-31 RX ORDER — SEMAGLUTIDE 1.7 MG/.75ML
1.7 INJECTION, SOLUTION SUBCUTANEOUS WEEKLY
Qty: 9 ML | Refills: 1 | Status: CANCELLED | OUTPATIENT
Start: 2025-03-31

## 2025-05-12 ENCOUNTER — PATIENT OUTREACH (OUTPATIENT)
Dept: CARE COORDINATION | Facility: CLINIC | Age: 50
End: 2025-05-12
Payer: COMMERCIAL

## 2025-06-16 ENCOUNTER — OFFICE VISIT (OUTPATIENT)
Dept: FAMILY MEDICINE | Facility: CLINIC | Age: 50
End: 2025-06-16
Payer: COMMERCIAL

## 2025-06-16 VITALS
OXYGEN SATURATION: 98 % | RESPIRATION RATE: 16 BRPM | BODY MASS INDEX: 29 KG/M2 | HEART RATE: 103 BPM | HEIGHT: 74 IN | TEMPERATURE: 98.3 F | DIASTOLIC BLOOD PRESSURE: 74 MMHG | WEIGHT: 226 LBS | SYSTOLIC BLOOD PRESSURE: 118 MMHG

## 2025-06-16 DIAGNOSIS — Z13.6 SCREENING FOR CARDIOVASCULAR CONDITION: ICD-10-CM

## 2025-06-16 DIAGNOSIS — Z00.00 ROUTINE GENERAL MEDICAL EXAMINATION AT A HEALTH CARE FACILITY: Primary | ICD-10-CM

## 2025-06-16 DIAGNOSIS — F17.200 TOBACCO USE DISORDER: ICD-10-CM

## 2025-06-16 DIAGNOSIS — I10 HYPERTENSION GOAL BP (BLOOD PRESSURE) < 130/80: ICD-10-CM

## 2025-06-16 DIAGNOSIS — B00.1 RECURRENT COLD SORES: ICD-10-CM

## 2025-06-16 DIAGNOSIS — E66.9 OBESITY WITH SERIOUS COMORBIDITY, UNSPECIFIED CLASS, UNSPECIFIED OBESITY TYPE: ICD-10-CM

## 2025-06-16 DIAGNOSIS — N52.9 ERECTILE DYSFUNCTION, UNSPECIFIED ERECTILE DYSFUNCTION TYPE: ICD-10-CM

## 2025-06-16 PROCEDURE — 3074F SYST BP LT 130 MM HG: CPT | Performed by: FAMILY MEDICINE

## 2025-06-16 PROCEDURE — 99396 PREV VISIT EST AGE 40-64: CPT | Performed by: FAMILY MEDICINE

## 2025-06-16 PROCEDURE — 99214 OFFICE O/P EST MOD 30 MIN: CPT | Mod: 25 | Performed by: FAMILY MEDICINE

## 2025-06-16 PROCEDURE — G2211 COMPLEX E/M VISIT ADD ON: HCPCS | Performed by: FAMILY MEDICINE

## 2025-06-16 PROCEDURE — 3078F DIAST BP <80 MM HG: CPT | Performed by: FAMILY MEDICINE

## 2025-06-16 RX ORDER — VALACYCLOVIR HYDROCHLORIDE 1 G/1
2000 TABLET, FILM COATED ORAL 2 TIMES DAILY
Qty: 12 TABLET | Refills: 2 | Status: SHIPPED | OUTPATIENT
Start: 2025-06-16

## 2025-06-16 RX ORDER — HYDROCHLOROTHIAZIDE 12.5 MG/1
12.5 TABLET ORAL DAILY
Qty: 90 TABLET | Refills: 4 | Status: SHIPPED | OUTPATIENT
Start: 2025-06-16

## 2025-06-16 RX ORDER — BUPROPION HYDROCHLORIDE 150 MG/1
150 TABLET, FILM COATED, EXTENDED RELEASE ORAL DAILY
Qty: 90 TABLET | Refills: 4 | Status: SHIPPED | OUTPATIENT
Start: 2025-06-16

## 2025-06-16 RX ORDER — SEMAGLUTIDE 1.7 MG/.75ML
1.7 INJECTION, SOLUTION SUBCUTANEOUS WEEKLY
Qty: 3 ML | Refills: 11 | Status: SHIPPED | OUTPATIENT
Start: 2025-06-16

## 2025-06-16 RX ORDER — SEMAGLUTIDE 1.7 MG/.75ML
1.7 INJECTION, SOLUTION SUBCUTANEOUS WEEKLY
Qty: 9 ML | Refills: 4 | Status: SHIPPED | OUTPATIENT
Start: 2025-06-16 | End: 2025-06-16

## 2025-06-16 RX ORDER — SILDENAFIL 50 MG/1
50-100 TABLET, FILM COATED ORAL DAILY PRN
Qty: 30 TABLET | Refills: 11 | Status: SHIPPED | OUTPATIENT
Start: 2025-06-16

## 2025-06-16 SDOH — HEALTH STABILITY: PHYSICAL HEALTH: ON AVERAGE, HOW MANY MINUTES DO YOU ENGAGE IN EXERCISE AT THIS LEVEL?: 40 MIN

## 2025-06-16 SDOH — HEALTH STABILITY: PHYSICAL HEALTH: ON AVERAGE, HOW MANY DAYS PER WEEK DO YOU ENGAGE IN MODERATE TO STRENUOUS EXERCISE (LIKE A BRISK WALK)?: 3 DAYS

## 2025-06-16 ASSESSMENT — SOCIAL DETERMINANTS OF HEALTH (SDOH): HOW OFTEN DO YOU GET TOGETHER WITH FRIENDS OR RELATIVES?: ONCE A WEEK

## 2025-06-16 NOTE — PROGRESS NOTES
"Preventive Care Visit  [facility]  Charly Mg MD, Family Medicine  Jun 16, 2025        Assessment & Plan     Routine general medical examination at a health care facility      Hypertension goal BP (blood pressure) < 130/80  Controlled - continue medication.   - Albumin Random Urine Quantitative with Creat Ratio  - COMPREHENSIVE METABOLIC PANEL    Screening for cardiovascular condition  - Lipid panel reflex to direct LDL Fasting    Obesity with serious comorbidity, unspecified class, unspecified obesity type  - Weight loss achieved with semaglutide (Wegovy) but financial concerns about continuing the medication.  - Prescription for semaglutide with multiple refills. He is advised to check insurance coverage and deductible status for potential cost reduction.  - Semaglutide-Weight Management (WEGOVY) 1.7 MG/0.75ML pen  Dispense: 3 mL; Refill: 11    Tobacco management with bupropion:  - Bupropion is being used to help with smoking cessation. He prefers to continue with the current regimen and takes 1 tab daily..  - Continue current bupropion regimen (one in the morning).    Consent was obtained from the patient to use an AI documentation tool in the creation of this note.      BMI  Estimated body mass index is 29.02 kg/m  as calculated from the following:    Height as of this encounter: 1.88 m (6' 2\").    Weight as of this encounter: 102.5 kg (226 lb).   Weight management plan: Discussed healthy diet and exercise guidelines and semaglutide    Counseling  Appropriate preventive services were addressed with this patient via screening, questionnaire, or discussion as appropriate for fall prevention, nutrition, physical activity, social engagement, weight loss and cognition.  Checklist reviewing preventive services available has been given to the patient.  Reviewed patient's diet, addressing concerns and/or questions.     Patient has been advised of split billing requirements and indicates " understanding: No    Return in about 1 year (around 6/16/2026) for wellness exam with Rosas Mg MD.    Follow-up Visit   Expected date: Jun 16, 2025      Follow Up Appointment Details:     Follow-up with whom?: Other Primary Care Services    Follow-Up for what?: Lab Visit    How?: In Person             Follow-up Visit   Expected date:  Jun 16, 2026 (Approximate)      Follow Up Appointment Details:     Follow-up with whom?: PCP    Follow-Up for what?: Adult Preventive    How?: In Person               The longitudinal plan of care for the diagnosis(es)/condition(s) as documented were addressed during this visit. Due to the added complexity in care, I will continue to support Wilian in the subsequent management and with ongoing continuity of care.        Rosas Mg MD     [address] [address]  [email or web address]     Office: [phone]           Subjective   Wilian is a 49 year old, presenting for the following:  Physical        6/16/2025     2:38 PM   Additional Questions   Roomed by Maribeth MCGINNIS CMA   Consent was obtained from the patient to use an AI documentation tool in the creation of this note.           HPI  He has been managing his weight with semaglutide, initially starting in October of the previous year and continuing through December. He experienced a weight loss of approximately 32 lbs, starting from 258 lbs. He stopped taking the medication in February and March, during which he noticed weight gain and a return to previous eating habits, including snacking on sweets and salty foods, particularly at night. He resumed semaglutide after experiencing a return of weight gain. He has been taking bupropion, which helped him quit smoking and manage his mood. He takes one pill in the morning, which he finds sufficient for managing his mood and reducing smoking urges. He has experienced occasional stomach pain, but not often. He has not been fasting and has not  undergone recent lab tests. He reports a reduction in ankle swelling compared to the past.    Medication Followup of weight management on Semaglutide-Weight Management (WEGOVY) 1.7 MG/0.75ML pen   Taking Medication as prescribed: yes  Side Effects:  None  Medication Helping Symptoms:  yes    Hypertension Follow-up    Do you check your blood pressure regularly outside of the clinic? No   Are you following a low salt diet? Yes  Are your blood pressures ever more than 140 on the top number (systolic) OR more   than 90 on the bottom number (diastolic), for example 140/90? N/A    BP Readings from Last 2 Encounters:   06/16/25 118/74   07/26/24 120/80     Advance Care Planning  Discussed advance care planning with patient; however, patient declined at this time.        6/16/2025   General Health   How would you rate your overall physical health? Good   Feel stress (tense, anxious, or unable to sleep) To some extent   (!) STRESS CONCERN      6/16/2025   Nutrition   Three or more servings of calcium each day? Yes   Diet: Low salt    Breakfast skipped   How many servings of fruit and vegetables per day? (!) 2-3   How many sweetened beverages each day? 0-1       Multiple values from one day are sorted in reverse-chronological order         6/16/2025   Exercise   Days per week of moderate/strenous exercise 3 days   Average minutes spent exercising at this level 40 min         6/16/2025   Social Factors   Frequency of gathering with friends or relatives Once a week   Worry food won't last until get money to buy more No   Food not last or not have enough money for food? No   Do you have housing? (Housing is defined as stable permanent housing and does not include staying outside in a car, in a tent, in an abandoned building, in an overnight shelter, or couch-surfing.) Yes   Are you worried about losing your housing? No   Lack of transportation? No   Unable to get utilities (heat,electricity)? No         6/16/2025   Dental  "  Dentist two times every year? Yes         Today's PHQ-2 Score:       2025    10:05 AM   PHQ-2 (  [employer])   Q1: Little interest or pleasure in doing things 0   Q2: Feeling down, depressed or hopeless 0   PHQ-2 Score 0    Q1: Little interest or pleasure in doing things Not at all   Q2: Feeling down, depressed or hopeless Not at all   PHQ-2 Score 0       Patient-reported           2025   Substance Use   Alcohol more than 3/day or more than 7/wk No   Do you use any other substances recreationally? No     Social History     Tobacco Use    Smoking status: Former     Current packs/day: 0.00     Types: Cigarettes     Quit date: 2003     Years since quittin.4    Smokeless tobacco: Former     Types: Chew     Quit date: 2020    Tobacco comments:     quit smoking in early  and quit chew in    Vaping Use    Vaping status: Never Used   Substance Use Topics    Alcohol use: Yes     Alcohol/week: 2.0 standard drinks of alcohol     Types: 2 Standard drinks or equivalent per week     Comment: occa    Drug use: No           2025   STI Screening   New sexual partner(s) since last STI/HIV test? No   ASCVD Risk   The 10-year ASCVD risk score (Carine MORRIS, et al., 2019) is: 3%    Values used to calculate the score:      Age: 49 years      Sex: Male      Is Non- : No      Diabetic: No      Tobacco smoker: No      Systolic Blood Pressure: 118 mmHg      Is BP treated: Yes      HDL Cholesterol: 42 mg/dL      Total Cholesterol: 181 mg/dL        2025   Contraception/Family Planning   Questions about contraception or family planning No        Reviewed and updated as needed this visit by Provider   Tobacco  Allergies  Meds  Problems  Med Hx  Surg Hx  Fam Hx             Objective    Exam  /74   Pulse 103   Temp 98.3  F (36.8  C) (Tympanic)   Resp 16   Ht 1.88 m (6' 2\")   Wt 102.5 kg (226 lb)   SpO2 98%   BMI 29.02 kg/m     Estimated body mass index " "is 29.02 kg/m  as calculated from the following:    Height as of this encounter: 1.88 m (6' 2\").    Weight as of this encounter: 102.5 kg (226 lb).    Physical Exam  GENERAL: healthy, alert and no distress  EYES: Eyes grossly normal to inspection, PERRL and conjunctivae and sclerae normal  HENT: ear canals and TM's normal, nose and mouth without ulcers or lesions  NECK: no adenopathy, no asymmetry, masses, or scars and thyroid normal to palpation  RESP: lungs clear to auscultation - no rales, rhonchi or wheezes  BREAST: normal without masses, tenderness or nipple discharge and no palpable axillary masses or adenopathy  CV: regular rate and rhythm, normal S1 S2, no S3 or S4, no murmur, click or rub, no peripheral edema and peripheral pulses strong  ABDOMEN: soft, nontender, no hepatosplenomegaly, no masses and bowel sounds normal   (male): normal male genitalia without lesions or urethral discharge, no hernia  MS: no gross musculoskeletal defects noted, no edema  SKIN: no suspicious lesions or rashes  NEURO: Normal strength and tone, mentation intact and speech normal  PSYCH: mentation appears normal, affect normal/bright  LYMPH: no cervical, supraclavicular, axillary, or inguinal adenopathy   RECTAL: declined exam    Signed Electronically by: Charly Mg MD    "

## 2025-06-16 NOTE — PATIENT INSTRUCTIONS
Patient Education   Preventive Care Advice   This is general advice given by our system to help you stay healthy. However, your care team may have specific advice just for you. Please talk to your care team about your preventive care needs.  Nutrition  Eat 5 or more servings of fruits and vegetables each day.  Try wheat bread, brown rice and whole grain pasta (instead of white bread, rice, and pasta).  Get enough calcium and vitamin D. Check the label on foods and aim for 100% of the RDA (recommended daily allowance).  Lifestyle  Exercise at least 150 minutes each week  (30 minutes a day, 5 days a week).  Do muscle strengthening activities 2 days a week. These help control your weight and prevent disease.  No smoking.  Wear sunscreen to prevent skin cancer.  Have a dental exam and cleaning every 6 months.  Yearly exams  See your health care team every year to talk about:  Any changes in your health.  Any medicines your care team has prescribed.  Preventive care, family planning, and ways to prevent chronic diseases.  Shots (vaccines)   HPV shots (up to age 26), if you've never had them before.  Hepatitis B shots (up to age 59), if you've never had them before.  COVID-19 shot: Get this shot when it's due.  Flu shot: Get a flu shot every year.  Tetanus shot: Get a tetanus shot every 10 years.  Pneumococcal, hepatitis A, and RSV shots: Ask your care team if you need these based on your risk.  Shingles shot (for age 50 and up)  General health tests  Diabetes screening:  Starting at age 35, Get screened for diabetes at least every 3 years.  If you are younger than age 35, ask your care team if you should be screened for diabetes.  Cholesterol test: At age 39, start having a cholesterol test every 5 years, or more often if advised.  Bone density scan (DEXA): At age 50, ask your care team if you should have this scan for osteoporosis (brittle bones).  Hepatitis C: Get tested at least once in your life.  STIs (sexually  transmitted infections)  Before age 24: Ask your care team if you should be screened for STIs.  After age 24: Get screened for STIs if you're at risk. You are at risk for STIs (including HIV) if:  You are sexually active with more than one person.  You don't use condoms every time.  You or a partner was diagnosed with a sexually transmitted infection.  If you are at risk for HIV, ask about PrEP medicine to prevent HIV.  Get tested for HIV at least once in your life, whether you are at risk for HIV or not.  Cancer screening tests  Cervical cancer screening: If you have a cervix, begin getting regular cervical cancer screening tests starting at age 21.  Breast cancer scan (mammogram): If you've ever had breasts, begin having regular mammograms starting at age 40. This is a scan to check for breast cancer.  Colon cancer screening: It is important to start screening for colon cancer at age 45.  Have a colonoscopy test every 10 years (or more often if you're at risk) Or, ask your provider about stool tests like a FIT test every year or Cologuard test every 3 years.  To learn more about your testing options, visit:   .  For help making a decision, visit:   https://bit.ly/mz68449.  Prostate cancer screening test: If you have a prostate, ask your care team if a prostate cancer screening test (PSA) at age 55 is right for you.  Lung cancer screening: If you are a current or former smoker ages 50 to 80, ask your care team if ongoing lung cancer screenings are right for you.  For informational purposes only. Not to replace the advice of your health care provider. Copyright   2023 [facility]. All rights reserved. Clinically reviewed by the [facility]. [employer] 179140 - REV 01/24.  Learning About Stress  What is stress?     Stress is your body's response to a hard situation. Your body can have a physical, emotional, or mental response. Stress is a fact of life for most people, and it  affects everyone differently. What causes stress for you may not be stressful for someone else.  A lot of things can cause stress. You may feel stress when you go on a job interview, take a test, or run a race. This kind of short-term stress is normal and even useful. It can help you if you need to work hard or react quickly. For example, stress can help you finish an important job on time.  Long-term stress is caused by ongoing stressful situations or events. Examples of long-term stress include long-term health problems, ongoing problems at work, or conflicts in your family. Long-term stress can harm your health.  How does stress affect your health?  When you are stressed, your body responds as though you are in danger. It makes hormones that speed up your heart, make you breathe faster, and give you a burst of energy. This is called the fight-or-flight stress response. If the stress is over quickly, your body goes back to normal and no harm is done.  But if stress happens too often or lasts too long, it can have bad effects. Long-term stress can make you more likely to get sick, and it can make symptoms of some diseases worse. If you tense up when you are stressed, you may develop neck, shoulder, or low back pain. Stress is linked to high blood pressure and heart disease.  Stress also harms your emotional health. It can make you kemp, tense, or depressed. Your relationships may suffer, and you may not do well at work or school.  What can you do to manage stress?  You can try these things to help manage stress:   Do something active. Exercise or activity can help reduce stress. Walking is a great way to get started. Even everyday activities such as housecleaning or yard work can help.  Try yoga or tran chi. These techniques combine exercise and meditation. You may need some training at first to learn them.  Do something you enjoy. For example, listen to music or go to a movie. Practice your hobby or do volunteer  "work.  Meditate. This can help you relax, because you are not worrying about what happened before or what may happen in the future.  Do guided imagery. Imagine yourself in any setting that helps you feel calm. You can use online videos, books, or a teacher to guide you.  Do breathing exercises. For example:  From a standing position, bend forward from the waist with your knees slightly bent. Let your arms dangle close to the floor.  Breathe in slowly and deeply as you return to a standing position. Roll up slowly and lift your head last.  Hold your breath for just a few seconds in the standing position.  Breathe out slowly and bend forward from the waist.  Let your feelings out. Talk, laugh, cry, and express anger when you need to. Talking with supportive friends or family, a counselor, or a jasmin leader about your feelings is a healthy way to relieve stress. Avoid discussing your feelings with people who make you feel worse.  Write. It may help to write about things that are bothering you. This helps you find out how much stress you feel and what is causing it. When you know this, you can find better ways to cope.  What can you do to prevent stress?  You might try some of these things to help prevent stress:  Manage your time. This helps you find time to do the things you want and need to do.  Get enough sleep. Your body recovers from the stresses of the day while you are sleeping.  Get support. Your family, friends, and community can make a difference in how you experience stress.  Limit your news feed. Avoid or limit time on social media or news that may make you feel stressed.  Do something active. Exercise or activity can help reduce stress. Walking is a great way to get started.  Where can you learn more?  Go to https://www.[employer].net/patiented  Enter N032 in the search box to learn more about \"Learning About Stress.\"  Current as of: October 24, 2024  Content Version: 14.5 2024-2025 [employer] :[employer]   Care instructions adapted under license by your healthcare professional. If you have questions about a medical condition or this instruction, always ask your healthcare professional. [employer] disclaims any warranty or liability for your use of this information.

## 2025-06-23 ENCOUNTER — LAB (OUTPATIENT)
Dept: LAB | Facility: CLINIC | Age: 50
End: 2025-06-23
Attending: FAMILY MEDICINE
Payer: COMMERCIAL

## 2025-06-23 ENCOUNTER — RESULTS FOLLOW-UP (OUTPATIENT)
Dept: FAMILY MEDICINE | Facility: CLINIC | Age: 50
End: 2025-06-23

## 2025-06-23 DIAGNOSIS — Z13.6 SCREENING FOR CARDIOVASCULAR CONDITION: ICD-10-CM

## 2025-06-23 DIAGNOSIS — I10 HYPERTENSION GOAL BP (BLOOD PRESSURE) < 130/80: ICD-10-CM

## 2025-06-23 LAB
ALBUMIN SERPL BCG-MCNC: 4 G/DL (ref 3.5–5.2)
ALP SERPL-CCNC: 64 U/L (ref 40–150)
ALT SERPL W P-5'-P-CCNC: 16 U/L (ref 0–70)
ANION GAP SERPL CALCULATED.3IONS-SCNC: 7 MMOL/L (ref 7–15)
AST SERPL W P-5'-P-CCNC: 22 U/L (ref 0–45)
BILIRUB SERPL-MCNC: 0.4 MG/DL
BUN SERPL-MCNC: 8.3 MG/DL (ref 6–20)
CALCIUM SERPL-MCNC: 8.7 MG/DL (ref 8.8–10.4)
CHLORIDE SERPL-SCNC: 103 MMOL/L (ref 98–107)
CHOLEST SERPL-MCNC: 168 MG/DL
CREAT SERPL-MCNC: 1.09 MG/DL (ref 0.67–1.17)
CREAT UR-MCNC: 217 MG/DL
EGFRCR SERPLBLD CKD-EPI 2021: 84 ML/MIN/1.73M2
FASTING STATUS PATIENT QL REPORTED: YES
FASTING STATUS PATIENT QL REPORTED: YES
GLUCOSE SERPL-MCNC: 96 MG/DL (ref 70–99)
HCO3 SERPL-SCNC: 27 MMOL/L (ref 22–29)
HDLC SERPL-MCNC: 52 MG/DL
LDLC SERPL CALC-MCNC: 108 MG/DL
MICROALBUMIN UR-MCNC: <12 MG/L
MICROALBUMIN/CREAT UR: NORMAL MG/G{CREAT}
NONHDLC SERPL-MCNC: 116 MG/DL
POTASSIUM SERPL-SCNC: 3.9 MMOL/L (ref 3.4–5.3)
PROT SERPL-MCNC: 6.1 G/DL (ref 6.4–8.3)
SODIUM SERPL-SCNC: 137 MMOL/L (ref 135–145)
TRIGL SERPL-MCNC: 42 MG/DL

## 2025-06-23 PROCEDURE — 36415 COLL VENOUS BLD VENIPUNCTURE: CPT

## 2025-06-23 PROCEDURE — 80061 LIPID PANEL: CPT

## 2025-06-23 PROCEDURE — 82043 UR ALBUMIN QUANTITATIVE: CPT

## 2025-06-23 PROCEDURE — 82570 ASSAY OF URINE CREATININE: CPT

## 2025-06-23 PROCEDURE — 80053 COMPREHEN METABOLIC PANEL: CPT

## 2025-06-25 DIAGNOSIS — F17.200 TOBACCO USE DISORDER: ICD-10-CM

## 2025-06-25 RX ORDER — BUPROPION HYDROCHLORIDE 150 MG/1
150 TABLET, FILM COATED, EXTENDED RELEASE ORAL 2 TIMES DAILY
Qty: 60 TABLET | Refills: 2 | OUTPATIENT
Start: 2025-06-25

## 2025-08-09 DIAGNOSIS — I10 HYPERTENSION GOAL BP (BLOOD PRESSURE) < 130/80: ICD-10-CM

## 2025-08-11 RX ORDER — HYDROCHLOROTHIAZIDE 12.5 MG/1
12.5 TABLET ORAL DAILY
Qty: 90 TABLET | Refills: 4 | OUTPATIENT
Start: 2025-08-11

## (undated) DEVICE — ENDO SNARE EXACTO COLD 9MM LOOP 2.4MMX230CM 00711115

## (undated) DEVICE — KIT ENDO TURNOVER/PROCEDURE W/CLEAN A SCOPE LINERS 103888

## (undated) DEVICE — ENDO TRAP POLYP QUICK CATCH 710201